# Patient Record
Sex: FEMALE | Race: AMERICAN INDIAN OR ALASKA NATIVE | NOT HISPANIC OR LATINO
[De-identification: names, ages, dates, MRNs, and addresses within clinical notes are randomized per-mention and may not be internally consistent; named-entity substitution may affect disease eponyms.]

---

## 2020-01-28 ENCOUNTER — APPOINTMENT (OUTPATIENT)
Dept: HEMATOLOGY ONCOLOGY | Facility: CLINIC | Age: 35
End: 2020-01-28

## 2020-08-28 ENCOUNTER — APPOINTMENT (OUTPATIENT)
Dept: INFUSION THERAPY | Facility: CLINIC | Age: 35
End: 2020-08-28

## 2020-08-28 ENCOUNTER — APPOINTMENT (OUTPATIENT)
Dept: HEMATOLOGY ONCOLOGY | Facility: CLINIC | Age: 35
End: 2020-08-28

## 2020-09-25 ENCOUNTER — APPOINTMENT (OUTPATIENT)
Dept: HEMATOLOGY ONCOLOGY | Facility: CLINIC | Age: 35
End: 2020-09-25
Payer: COMMERCIAL

## 2020-09-25 PROCEDURE — 99203 OFFICE O/P NEW LOW 30 MIN: CPT | Mod: 95

## 2020-10-01 NOTE — ASSESSMENT
[FreeTextEntry1] : 34 year old female with Von Willebrand disease type 1 , history of severe menorrhagia , anemia requiring transfusions , currently controlled on OCPs. \par She is scheduled for egg harvest and  due to increased bleeding risk  at the puncture site (both  vaginal and ovarian ) , would recommend in addition to local measures , premedication with DDAVP 0.3 microgram/kg ( total 18 microgram in 50 cc normal saline  over 15 to 30 minutes , 30 minutes before procedure . Observe for few hours post procedure for any internal bleeding .

## 2020-10-01 NOTE — HISTORY OF PRESENT ILLNESS
[de-identified] : 34 year old female with Von Willebrand disease type 1 , DDAVP responsive , diagnosed in her teens with menorrhagia , severe anemia required transfusions and parenteral iron . \par She had only modest response to intranasal DDAVP and tranexamic acid however menstrual bleeding is controlled with OCPs and has not required transfusions in many years . Most recent Hb is normal . She continues to experience excessive bleeding with dental work . \par  [de-identified] : 09/25/2020 Today's encounter was carried out via telehealth at her request and in compliance with CDC guidelines given the extreme circumstances surrounding Covid 19 out break . 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 Her menstrual bleeding is well controlled with OCPs but continues to report excessive bleeding with dental work ( improved slightly with tranexamic acid ) . Denies any other bleeding symptoms . \par She is now scheduled for egg harvest in early October .

## 2021-04-20 ENCOUNTER — APPOINTMENT (OUTPATIENT)
Dept: HEMATOLOGY ONCOLOGY | Facility: CLINIC | Age: 36
End: 2021-04-20
Payer: COMMERCIAL

## 2021-04-20 ENCOUNTER — APPOINTMENT (OUTPATIENT)
Dept: INFUSION THERAPY | Facility: CLINIC | Age: 36
End: 2021-04-20

## 2021-04-20 ENCOUNTER — LABORATORY RESULT (OUTPATIENT)
Age: 36
End: 2021-04-20

## 2021-04-20 VITALS
DIASTOLIC BLOOD PRESSURE: 62 MMHG | SYSTOLIC BLOOD PRESSURE: 103 MMHG | TEMPERATURE: 98.6 F | HEART RATE: 66 BPM | HEIGHT: 64 IN | BODY MASS INDEX: 23.05 KG/M2 | RESPIRATION RATE: 16 BRPM | WEIGHT: 135 LBS

## 2021-04-20 LAB
HCT VFR BLD CALC: 35.6 %
HGB BLD-MCNC: 10.5 G/DL
MCHC RBC-ENTMCNC: 25.8 PG
MCHC RBC-ENTMCNC: 29.5 G/DL
MCV RBC AUTO: 87.5 FL
PLATELET # BLD AUTO: 255 K/UL
PMV BLD: 8.8 FL
RBC # BLD: 4.07 M/UL
RBC # FLD: 17.2 %
WBC # FLD AUTO: 5.73 K/UL

## 2021-04-20 PROCEDURE — 99214 OFFICE O/P EST MOD 30 MIN: CPT

## 2021-04-20 RX ORDER — PNV NO.95/FERROUS FUM/FOLIC AC 28MG-0.8MG
TABLET ORAL
Refills: 0 | Status: ACTIVE | COMMUNITY
Start: 2021-04-20

## 2021-04-21 LAB
ALBUMIN SERPL ELPH-MCNC: 4.4 G/DL
ALP BLD-CCNC: 57 U/L
ALT SERPL-CCNC: 12 U/L
ANION GAP SERPL CALC-SCNC: 11 MMOL/L
AST SERPL-CCNC: 15 U/L
BILIRUB SERPL-MCNC: 0.3 MG/DL
BUN SERPL-MCNC: 11 MG/DL
CALCIUM SERPL-MCNC: 9.5 MG/DL
CHLORIDE SERPL-SCNC: 103 MMOL/L
CO2 SERPL-SCNC: 23 MMOL/L
CREAT SERPL-MCNC: 0.7 MG/DL
FERRITIN SERPL-MCNC: 25 NG/ML
GLUCOSE SERPL-MCNC: 83 MG/DL
POTASSIUM SERPL-SCNC: 4.3 MMOL/L
PROT SERPL-MCNC: 7.3 G/DL
SODIUM SERPL-SCNC: 137 MMOL/L
VWF AG PPP IA-ACNC: 147 %

## 2021-04-22 ENCOUNTER — TRANSCRIPTION ENCOUNTER (OUTPATIENT)
Age: 36
End: 2021-04-22

## 2021-04-23 LAB — FACT VIII ACT/NOR PPP: 97 %

## 2021-04-23 NOTE — ASSESSMENT
[FreeTextEntry1] : 35 yof with history of Von Willebrand disease ( type ? ) , DDAVP responsive , and iron deficiency anemia secondary to severe menorrhagia, previously requiring PRBC transfusions, currently controlled with OCPs and tranexamic acid.\par \par - Will check VWF activity, antigen, multimers, and factor VIII levels to obtain new baseline.\par - CBC reviewed. Hemoglobin is 10.5. Ferritin pending. If iron deficiency confirmed, will schedule for iron infusions prior to planned procedure.\par - Continue tranexamic acid during heavy days of menstrual cycle.\par - Patient is s/p egg harvest and is scheduled for outpatient salpingostomy/plasty , possibly bilateral  and surgery may last 4 hours due to adhesions from prior appendectomy , will dicuss with Dr. Abelardo Alicea and likely recommend Humate-p infusions prophylactically . \par - Follow up plan to be determined based on results of above.

## 2021-04-23 NOTE — HISTORY OF PRESENT ILLNESS
[de-identified] : 34 year old female with Von Willebrand disease type 1 , DDAVP responsive , diagnosed in her teens with menorrhagia , severe anemia required transfusions and parenteral iron . \par She had only modest response to intranasal DDAVP and tranexamic acid however menstrual bleeding is controlled with OCPs and has not required transfusions in many years . Most recent Hb is normal . She continues to experience excessive bleeding with dental work . \par  [de-identified] : 09/25/2020 Today's encounter was carried out via telehealth at her request and in compliance with [facility] guidelines given the extreme circumstances surrounding Covid 19 out break . 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 Her menstrual bleeding is well controlled with OCPs but continues to report excessive bleeding with dental work ( improved slightly with tranexamic acid ) . Denies any other bleeding symptoms . \par She is now scheduled for egg harvest in early October . \par \par 4/20/2021: Patient presents for follow up. Underwent egg harvest with preprocedural DDAVP without bleeding complications. She is planned for outpatient salpingostomy with Dr. Abelardo Alicea on 5/12/2021. Per patient, procedure is estimated to last about 3-4 hours. If surgery is successful, plan is for embryo transfer about one month later. Patient remains on OCPs and reports that she has been spotting for the past month. Her menses are extremely heavy without the OCPs. She is taking oral iron and prenatal vitamins daily. Reports experiencing some nausea with oral iron use.

## 2021-04-23 NOTE — REVIEW OF SYSTEMS
[Easy Bleeding] : a tendency for easy bleeding [Negative] : Psychiatric [FreeTextEntry7] : nausea with oral iron use [FreeTextEntry8] : menorrhagia without OCP use

## 2021-04-26 ENCOUNTER — APPOINTMENT (OUTPATIENT)
Dept: INFUSION THERAPY | Facility: CLINIC | Age: 36
End: 2021-04-26

## 2021-04-26 RX ORDER — FOSAPREPITANT DIMEGLUMINE 150 MG/5ML
510 INJECTION, POWDER, LYOPHILIZED, FOR SOLUTION INTRAVENOUS ONCE
Refills: 0 | Status: DISCONTINUED | OUTPATIENT
Start: 2021-04-26 | End: 2021-04-26

## 2021-04-26 RX ORDER — HYDROCORTISONE 20 MG
100 TABLET ORAL ONCE
Refills: 0 | Status: COMPLETED | OUTPATIENT
Start: 2021-04-26 | End: 2021-04-26

## 2021-04-26 RX ORDER — ACETAMINOPHEN 500 MG
650 TABLET ORAL ONCE
Refills: 0 | Status: COMPLETED | OUTPATIENT
Start: 2021-04-26 | End: 2021-04-26

## 2021-04-26 RX ORDER — FERUMOXYTOL 510 MG/17ML
510 INJECTION INTRAVENOUS ONCE
Refills: 0 | Status: COMPLETED | OUTPATIENT
Start: 2021-04-26 | End: 2021-04-26

## 2021-04-26 RX ADMIN — Medication 650 MILLIGRAM(S): at 15:15

## 2021-04-26 RX ADMIN — FERUMOXYTOL 510 MILLIGRAM(S): 510 INJECTION INTRAVENOUS at 16:05

## 2021-04-26 RX ADMIN — FERUMOXYTOL 156 MILLIGRAM(S): 510 INJECTION INTRAVENOUS at 15:35

## 2021-04-26 RX ADMIN — Medication 650 MILLIGRAM(S): at 15:19

## 2021-04-26 RX ADMIN — Medication 100 MILLIGRAM(S): at 15:15

## 2021-04-26 RX ADMIN — Medication 100 MILLIGRAM(S): at 15:35

## 2021-04-30 ENCOUNTER — APPOINTMENT (OUTPATIENT)
Dept: HEMATOLOGY ONCOLOGY | Facility: CLINIC | Age: 36
End: 2021-04-30

## 2021-04-30 ENCOUNTER — OUTPATIENT (OUTPATIENT)
Dept: OUTPATIENT SERVICES | Facility: HOSPITAL | Age: 36
LOS: 1 days | Discharge: HOME | End: 2021-04-30

## 2021-04-30 DIAGNOSIS — D68.0 VON WILLEBRAND DISEASE: ICD-10-CM

## 2021-04-30 DIAGNOSIS — D50.9 IRON DEFICIENCY ANEMIA, UNSPECIFIED: ICD-10-CM

## 2021-05-06 LAB — VWF:RCO ACT/NOR PPP PL AGG: 66 %

## 2021-05-17 LAB — VWF MULTIMERS PPP IA-ACNC: NORMAL

## 2021-06-17 ENCOUNTER — APPOINTMENT (OUTPATIENT)
Dept: OBGYN | Facility: CLINIC | Age: 36
End: 2021-06-17
Payer: COMMERCIAL

## 2021-06-17 DIAGNOSIS — N70.11 CHRONIC SALPINGITIS: ICD-10-CM

## 2021-06-17 PROCEDURE — 99072 ADDL SUPL MATRL&STAF TM PHE: CPT

## 2021-06-17 PROCEDURE — 99203 OFFICE O/P NEW LOW 30 MIN: CPT

## 2021-06-17 NOTE — PLAN
[FreeTextEntry1] : 35 p0\par Primary infertility\par Tubal Factor +/- additional issue\par Under the care of SHYANNE/Nixon. \par Has 2 frozen empryos\par She was recommended to have a salpingectomy vs chromotubation and possible fimbrioplasty to open her tubes before she has an embryo transfer.\par Given her mediocre response to ovulation induction - the doctors at [facility] thought it would be prudent to try and preserve her tubes.\par \par I conselled patient at length that she would be eliza served with a reproductive endocrinologist with expertise in both IVF and fertility surgery. \par \par I referred her to Dr. Ramirez and Dr. Dye.

## 2021-06-17 NOTE — HISTORY OF PRESENT ILLNESS
[FreeTextEntry1] : 35 p0\par C/O infertility x 2 years\par seeing Dr. Alicea\par Had 2 cycles of IVF (Oct 2020, Dec 2020)\par First cycle - 4 mature eggs , 1 genetic normal embryo (frozen) Day 6 (pgt done)\par second cycle - 9 eggs, 1 genetic normal embryo (frozen) Day 7 (pgt done)\par She had sonogram done before planned embryo transfer and there was fluid in the uterus and in the fallopian tube. She was told that she needs to have fallopian tube cleaned or removed by Dr. Alicea.\par Given her VWD - Dr. Ivan recommended that it be done in the hospital\par Patient with VWD as a child - diagnosed due to heavy periods.\par \par \par

## 2021-06-24 ENCOUNTER — OUTPATIENT (OUTPATIENT)
Dept: OUTPATIENT SERVICES | Facility: HOSPITAL | Age: 36
LOS: 1 days | Discharge: HOME | End: 2021-06-24
Payer: COMMERCIAL

## 2021-06-24 VITALS
HEART RATE: 66 BPM | TEMPERATURE: 98 F | OXYGEN SATURATION: 99 % | WEIGHT: 143.08 LBS | DIASTOLIC BLOOD PRESSURE: 77 MMHG | SYSTOLIC BLOOD PRESSURE: 117 MMHG | RESPIRATION RATE: 17 BRPM

## 2021-06-24 DIAGNOSIS — N70.11 CHRONIC SALPINGITIS: ICD-10-CM

## 2021-06-24 DIAGNOSIS — Z90.49 ACQUIRED ABSENCE OF OTHER SPECIFIED PARTS OF DIGESTIVE TRACT: Chronic | ICD-10-CM

## 2021-06-24 DIAGNOSIS — Z01.818 ENCOUNTER FOR OTHER PREPROCEDURAL EXAMINATION: ICD-10-CM

## 2021-06-24 LAB
ALBUMIN SERPL ELPH-MCNC: 4.5 G/DL — SIGNIFICANT CHANGE UP (ref 3.5–5.2)
ALP SERPL-CCNC: 65 U/L — SIGNIFICANT CHANGE UP (ref 30–115)
ALT FLD-CCNC: 11 U/L — SIGNIFICANT CHANGE UP (ref 0–41)
ANION GAP SERPL CALC-SCNC: 11 MMOL/L — SIGNIFICANT CHANGE UP (ref 7–14)
APTT BLD: 37.2 SEC — SIGNIFICANT CHANGE UP (ref 27–39.2)
AST SERPL-CCNC: 13 U/L — SIGNIFICANT CHANGE UP (ref 0–41)
BILIRUB SERPL-MCNC: 0.3 MG/DL — SIGNIFICANT CHANGE UP (ref 0.2–1.2)
BUN SERPL-MCNC: 13 MG/DL — SIGNIFICANT CHANGE UP (ref 10–20)
CALCIUM SERPL-MCNC: 9.8 MG/DL — SIGNIFICANT CHANGE UP (ref 8.5–10.1)
CHLORIDE SERPL-SCNC: 100 MMOL/L — SIGNIFICANT CHANGE UP (ref 98–110)
CO2 SERPL-SCNC: 26 MMOL/L — SIGNIFICANT CHANGE UP (ref 17–32)
CREAT SERPL-MCNC: 0.6 MG/DL — LOW (ref 0.7–1.5)
GLUCOSE SERPL-MCNC: 87 MG/DL — SIGNIFICANT CHANGE UP (ref 70–99)
HCT VFR BLD CALC: 38.6 % — SIGNIFICANT CHANGE UP (ref 37–47)
HGB BLD-MCNC: 12.4 G/DL — SIGNIFICANT CHANGE UP (ref 12–16)
INR BLD: 0.96 RATIO — SIGNIFICANT CHANGE UP (ref 0.65–1.3)
MCHC RBC-ENTMCNC: 27.8 PG — SIGNIFICANT CHANGE UP (ref 27–31)
MCHC RBC-ENTMCNC: 32.1 G/DL — SIGNIFICANT CHANGE UP (ref 32–37)
MCV RBC AUTO: 86.5 FL — SIGNIFICANT CHANGE UP (ref 81–99)
NRBC # BLD: 0 /100 WBCS — SIGNIFICANT CHANGE UP (ref 0–0)
PLATELET # BLD AUTO: 235 K/UL — SIGNIFICANT CHANGE UP (ref 130–400)
POTASSIUM SERPL-MCNC: 4.5 MMOL/L — SIGNIFICANT CHANGE UP (ref 3.5–5)
POTASSIUM SERPL-SCNC: 4.5 MMOL/L — SIGNIFICANT CHANGE UP (ref 3.5–5)
PROT SERPL-MCNC: 7.1 G/DL — SIGNIFICANT CHANGE UP (ref 6–8)
PROTHROM AB SERPL-ACNC: 11 SEC — SIGNIFICANT CHANGE UP (ref 9.95–12.87)
RBC # BLD: 4.46 M/UL — SIGNIFICANT CHANGE UP (ref 4.2–5.4)
RBC # FLD: 16.2 % — HIGH (ref 11.5–14.5)
SODIUM SERPL-SCNC: 137 MMOL/L — SIGNIFICANT CHANGE UP (ref 135–146)
WBC # BLD: 6.43 K/UL — SIGNIFICANT CHANGE UP (ref 4.8–10.8)
WBC # FLD AUTO: 6.43 K/UL — SIGNIFICANT CHANGE UP (ref 4.8–10.8)

## 2021-06-24 PROCEDURE — 93010 ELECTROCARDIOGRAM REPORT: CPT

## 2021-06-24 NOTE — H&P PST ADULT - REASON FOR ADMISSION
36 Y/O FEMALE HERE FOR PRE-ADMISSION SURGICAL TESTING. PATIENT REPORTS + INFERTILITY. + FLUID IN THE UTERUS. STATES ONE OF THE TUBES HAVE TO BE CLEANED OUT AND THE OTHER MAY HAVE TO BE REMOVED.  NOW FOR SCHEDULED PROCEDURE.

## 2021-06-24 NOTE — H&P PST ADULT - HISTORY OF PRESENT ILLNESS
PATIENT DENIES CHEST PAIN, SHORTNESS OF BREATH, PALPITATIONS, COUGHING, FEVER, DYSURIA.  CAN WALK UP 4 FLIGHTS OF STEPS WITHOUT SOB.    NO COUGH, FEVER, SORE THROAT, HEADACHE, LOSS OF TASTE OR SMELL. NO KNOWN EXPOSURE TO ANYONE WITH COVID. PATIENT WAS INSTRUCTED TO ISOLATE FROM NOW UNTIL THE SURGERY.  Anesthesia Alert  NO--Difficult Airway  NO--History of neck surgery or radiation  NO--Limited ROM of neck  NO--History of Malignant hyperthermia  NO--Personal or family history of Pseudocholinesterase deficiency  NO--Prior Anesthesia Complication  NO--Latex Allergy  NO--Loose teeth  NO--History of Rheumatoid Arthritis  NO--MATHIEU  NO--bleeding risk

## 2021-06-24 NOTE — H&P PST ADULT - NSICDXFAMILYHX_GEN_ALL_CORE_FT
FAMILY HISTORY:  Father  Still living? Unknown  FH: CAD (coronary artery disease), Age at diagnosis: Age Unknown  FH: HTN (hypertension), Age at diagnosis: Age Unknown  FH: hypercholesterolemia, Age at diagnosis: Age Unknown    Mother  Still living? Yes, Estimated age: Age Unknown  FH: CAD (coronary artery disease), Age at diagnosis: Age Unknown  FH: HTN (hypertension), Age at diagnosis: Age Unknown  FH: hypercholesterolemia, Age at diagnosis: Age Unknown

## 2021-06-24 NOTE — H&P PST ADULT - NSICDXPASTMEDICALHX_GEN_ALL_CORE_FT
PAST MEDICAL HISTORY:  Asthma no attacks- mild    AMY (iron deficiency anemia)     Type 2A von Willebrand disease

## 2021-06-26 ENCOUNTER — LABORATORY RESULT (OUTPATIENT)
Age: 36
End: 2021-06-26

## 2021-06-26 ENCOUNTER — APPOINTMENT (OUTPATIENT)
Dept: DISASTER EMERGENCY | Facility: CLINIC | Age: 36
End: 2021-06-26

## 2021-06-26 PROBLEM — D68.0 VON WILLEBRAND DISEASE: Chronic | Status: ACTIVE | Noted: 2021-06-24

## 2021-06-26 PROBLEM — J45.909 UNSPECIFIED ASTHMA, UNCOMPLICATED: Chronic | Status: ACTIVE | Noted: 2021-06-24

## 2021-06-26 PROBLEM — D50.9 IRON DEFICIENCY ANEMIA, UNSPECIFIED: Chronic | Status: ACTIVE | Noted: 2021-06-24

## 2021-06-29 ENCOUNTER — INPATIENT (INPATIENT)
Facility: HOSPITAL | Age: 36
LOS: 1 days | Discharge: HOME | End: 2021-07-01
Attending: OBSTETRICS & GYNECOLOGY | Admitting: OBSTETRICS & GYNECOLOGY

## 2021-06-29 VITALS
WEIGHT: 143.08 LBS | OXYGEN SATURATION: 100 % | RESPIRATION RATE: 18 BRPM | TEMPERATURE: 99 F | HEART RATE: 62 BPM | SYSTOLIC BLOOD PRESSURE: 108 MMHG | HEIGHT: 64 IN | DIASTOLIC BLOOD PRESSURE: 56 MMHG

## 2021-06-29 DIAGNOSIS — J45.909 UNSPECIFIED ASTHMA, UNCOMPLICATED: ICD-10-CM

## 2021-06-29 DIAGNOSIS — N70.11 CHRONIC SALPINGITIS: ICD-10-CM

## 2021-06-29 DIAGNOSIS — D68.0 VON WILLEBRAND DISEASE: ICD-10-CM

## 2021-06-29 DIAGNOSIS — N97.9 FEMALE INFERTILITY, UNSPECIFIED: ICD-10-CM

## 2021-06-29 DIAGNOSIS — Z90.49 ACQUIRED ABSENCE OF OTHER SPECIFIED PARTS OF DIGESTIVE TRACT: Chronic | ICD-10-CM

## 2021-06-29 DIAGNOSIS — N73.6 FEMALE PELVIC PERITONEAL ADHESIONS (POSTINFECTIVE): ICD-10-CM

## 2021-06-29 DIAGNOSIS — D50.9 IRON DEFICIENCY ANEMIA, UNSPECIFIED: ICD-10-CM

## 2021-06-29 RX ORDER — SODIUM CHLORIDE 9 MG/ML
1000 INJECTION, SOLUTION INTRAVENOUS
Refills: 0 | Status: DISCONTINUED | OUTPATIENT
Start: 2021-06-29 | End: 2021-06-30

## 2021-06-29 RX ORDER — HYDROMORPHONE HYDROCHLORIDE 2 MG/ML
0.5 INJECTION INTRAMUSCULAR; INTRAVENOUS; SUBCUTANEOUS
Refills: 0 | Status: DISCONTINUED | OUTPATIENT
Start: 2021-06-29 | End: 2021-06-29

## 2021-06-29 RX ORDER — ONDANSETRON 8 MG/1
4 TABLET, FILM COATED ORAL ONCE
Refills: 0 | Status: DISCONTINUED | OUTPATIENT
Start: 2021-06-29 | End: 2021-07-01

## 2021-06-29 RX ORDER — HYDROMORPHONE HYDROCHLORIDE 2 MG/ML
1 INJECTION INTRAMUSCULAR; INTRAVENOUS; SUBCUTANEOUS
Refills: 0 | Status: DISCONTINUED | OUTPATIENT
Start: 2021-06-29 | End: 2021-06-29

## 2021-06-29 RX ORDER — IBUPROFEN 200 MG
600 TABLET ORAL EVERY 6 HOURS
Refills: 0 | Status: DISCONTINUED | OUTPATIENT
Start: 2021-06-29 | End: 2021-06-29

## 2021-06-29 RX ORDER — APREPITANT 80 MG/1
40 CAPSULE ORAL ONCE
Refills: 0 | Status: COMPLETED | OUTPATIENT
Start: 2021-06-29 | End: 2021-06-29

## 2021-06-29 RX ORDER — ACETAMINOPHEN 500 MG
650 TABLET ORAL EVERY 6 HOURS
Refills: 0 | Status: DISCONTINUED | OUTPATIENT
Start: 2021-06-29 | End: 2021-07-01

## 2021-06-29 RX ORDER — OXYCODONE HYDROCHLORIDE 5 MG/1
5 TABLET ORAL EVERY 6 HOURS
Refills: 0 | Status: DISCONTINUED | OUTPATIENT
Start: 2021-06-29 | End: 2021-07-01

## 2021-06-29 RX ORDER — SODIUM CHLORIDE 9 MG/ML
1000 INJECTION, SOLUTION INTRAVENOUS
Refills: 0 | Status: DISCONTINUED | OUTPATIENT
Start: 2021-06-29 | End: 2021-06-29

## 2021-06-29 RX ADMIN — Medication 650 MILLIGRAM(S): at 22:39

## 2021-06-29 RX ADMIN — APREPITANT 40 MILLIGRAM(S): 80 CAPSULE ORAL at 11:00

## 2021-06-29 RX ADMIN — SODIUM CHLORIDE 125 MILLILITER(S): 9 INJECTION, SOLUTION INTRAVENOUS at 14:39

## 2021-06-29 NOTE — PROGRESS NOTE ADULT - ASSESSMENT
36yo female is PMH of von willebrand disease s/p diagnostic laparoscopy humate-P for surgical prophylaxis, doing well    -continue routine post OP care  -vitals per routine  -no lovenox or motrin  -pain management PRN  -humate [id number] x3 doses ordered  -AM labs ordered  -dc until ambulating      Dr. Ramirez and Dr. Jean-Baptiste to be made aware 34yo female is PMH of von willebrand disease s/p diagnostic laparoscopy humate-P for surgical prophylaxis, doing well    -continue routine post OP care  -vitals per routine  -no lovenox or motrin  -pain management PRN  -humate [id number] x3 doses ordered  -AM labs ordered  -dc until ambulating, f/u UO      Dr. Ramirez and Dr. Jean-Baptiste to be made aware

## 2021-06-29 NOTE — BRIEF OPERATIVE NOTE - OPERATION/FINDINGS
normal sized uterus and nulliparus cervix, on laparoscopy bowel densly adhered to anterior abdominal wall, pelvic anatomy unable to be visualized due to adhesions. Intraoperative gyn oncology and general surgery consults made both of which agreed that proceeding with laparoscopy with dangerous and not in the best interest of the patient

## 2021-06-29 NOTE — ASU PATIENT PROFILE, ADULT - TEACHING/LEARNING RELIGIOUS CONSIDERATIONS
Pt stated he is driving to [facility] clinic to sign release of information paperwork.   none

## 2021-06-29 NOTE — CHART NOTE - NSCHARTNOTEFT_GEN_A_CORE
PACU ANESTHESIA ADMISSION NOTE      Procedure: failed open laparoscopy   Post op diagnosis:      ____  Intubated  TV:______       Rate: ______      FiO2: ______    _x___  Patent Airway    _x___  Full return of protective reflexes    _x___  Full recovery from anesthesia / back to baseline status    Vitals:  T 97.9 f  HR: 72  BP: 98/55  RR: 16  SpO2: 100% on NC 3 L/min    Mental Status:  _x___ Awake   _____ Alert   _x____ Drowsy   _____ Sedated    Nausea/Vomiting:  _x___  NO       ______Yes,   See Post - Op Orders         Pain Scale (0-10):  __0___    Treatment: _x___ None    ____ See Post - Op/PCA Orders    Post - Operative Fluids:   ____ Oral   __x__ See Post - Op Orders    Plan: Discharge:   ____Home       ___x__Floor     _____Critical Care    _____  Other:_________________    Comments: uneventful GETA, VSS, full report to pacu rn  No anesthesia issues or complications noted.  Discharge when criteria met.

## 2021-06-29 NOTE — BRIEF OPERATIVE NOTE - NSICDXBRIEFPOSTOP_GEN_ALL_CORE_FT
POST-OP DIAGNOSIS:  Pelvic adhesive disease 29-Jun-2021 14:34:42 abdominal adhesive disease Dye, Sherin

## 2021-06-29 NOTE — ASU PATIENT PROFILE, ADULT - CAREGIVER NAME
Pre op Risk Assessment    Procedure Oral surgery, extractions with IV sedation  Physician Dr. Yokasta Vega  Date of surgery/procedure TBD    Last OV 8/24/2017  Last Stress 4/21/2017  Last Echo 7/14/2016  Last Cath 3/21/2017  Last Stent 3/21/2017  Is patient on blood thinners  Plavix   Hold Meds/how many days Avni Pal

## 2021-06-29 NOTE — PROGRESS NOTE ADULT - SUBJECTIVE AND OBJECTIVE BOX
Chief Complaint:     HPI: Pt doing well, pain well controlled. Denies nausea, vomiting, headache, chest pain, SOB, fevers, chills. Patient has not yet ambulated or passed flatus,but is tolerating a clear liquid diet. Gillespie in place draining clear adequate urine    ROS: Denies cardiovascular or respiratory symptoms    PAST MEDICAL & SURGICAL HISTORY:  Asthma  no attacks- mild    Type 2A von Willebrand disease    AMY (iron deficiency anemia)    History of appendectomy  13 y/o        Physical Exam  Vital Signs Last 24 Hrs  T(F): 97.1 (29 Jun 2021 17:43), Max: 98.6 (29 Jun 2021 10:29)  HR: 85 (29 Jun 2021 17:43) (62 - 85)  BP: 116/71 (29 Jun 2021 17:43) (96/55 - 120/66)  RR: 20 (29 Jun 2021 17:43) (11 - 20)    Physical exam:  General - AAOx3, NAD  Heart - S1S2, RRR  Lungs - CTA BL  Abdomen:  - Soft, nontender, nondistended, BS+  - Clean, dry, intact dressing in place over midline vertical incision  Extremities - No calf tenderness, no swelling    Labs:                        12.4   6.43  )-----------( 235      ( 24 Jun 2021 22:09 )             38.6                Chief Complaint:     HPI: Pt doing well, pain well controlled. Denies nausea, vomiting, headache, chest pain, SOB, fevers, chills. Patient has not yet ambulated or passed flatus,but is tolerating a clear liquid diet. Gillespie in place draining clear adequate urine    ROS: Denies cardiovascular or respiratory symptoms    PAST MEDICAL & SURGICAL HISTORY:  Asthma  no attacks- mild    Type 2A von Willebrand disease    AMY (iron deficiency anemia)    History of appendectomy  15 y/o        Physical Exam  Vital Signs Last 24 Hrs  T(F): 97.1 (29 Jun 2021 17:43), Max: 98.6 (29 Jun 2021 10:29)  HR: 85 (29 Jun 2021 17:43) (62 - 85)  BP: 116/71 (29 Jun 2021 17:43) (96/55 - 120/66)  RR: 20 (29 Jun 2021 17:43) (11 - 20)    Physical exam:  General - AAOx3, NAD  Heart - S1S2, RRR  Lungs - CTA BL  Abdomen:  - Soft, nontender, nondistended, BS+  - Clean, dry, intact dressing in place over midline vertical incision  Extremities - No calf tenderness, no swelling    Labs:                        12.4   6.43  )-----------( 235      ( 24 Jun 2021 22:09 )             38.6         UOmin 32cc/hr: 6810-2138: 700cc

## 2021-06-30 LAB
ANION GAP SERPL CALC-SCNC: 13 MMOL/L — SIGNIFICANT CHANGE UP (ref 7–14)
BASOPHILS # BLD AUTO: 0.01 K/UL — SIGNIFICANT CHANGE UP (ref 0–0.2)
BASOPHILS NFR BLD AUTO: 0.1 % — SIGNIFICANT CHANGE UP (ref 0–1)
BUN SERPL-MCNC: 6 MG/DL — LOW (ref 10–20)
CALCIUM SERPL-MCNC: 9.3 MG/DL — SIGNIFICANT CHANGE UP (ref 8.5–10.1)
CHLORIDE SERPL-SCNC: 103 MMOL/L — SIGNIFICANT CHANGE UP (ref 98–110)
CO2 SERPL-SCNC: 22 MMOL/L — SIGNIFICANT CHANGE UP (ref 17–32)
CREAT SERPL-MCNC: 0.6 MG/DL — LOW (ref 0.7–1.5)
EOSINOPHIL # BLD AUTO: 0.01 K/UL — SIGNIFICANT CHANGE UP (ref 0–0.7)
EOSINOPHIL NFR BLD AUTO: 0.1 % — SIGNIFICANT CHANGE UP (ref 0–8)
GLUCOSE SERPL-MCNC: 158 MG/DL — HIGH (ref 70–99)
HCT VFR BLD CALC: 34.8 % — LOW (ref 37–47)
HGB BLD-MCNC: 11.2 G/DL — LOW (ref 12–16)
IMM GRANULOCYTES NFR BLD AUTO: 0.6 % — HIGH (ref 0.1–0.3)
LYMPHOCYTES # BLD AUTO: 1.61 K/UL — SIGNIFICANT CHANGE UP (ref 1.2–3.4)
LYMPHOCYTES # BLD AUTO: 14.4 % — LOW (ref 20.5–51.1)
MAGNESIUM SERPL-MCNC: 1.7 MG/DL — LOW (ref 1.8–2.4)
MCHC RBC-ENTMCNC: 27.6 PG — SIGNIFICANT CHANGE UP (ref 27–31)
MCHC RBC-ENTMCNC: 32.2 G/DL — SIGNIFICANT CHANGE UP (ref 32–37)
MCV RBC AUTO: 85.7 FL — SIGNIFICANT CHANGE UP (ref 81–99)
MONOCYTES # BLD AUTO: 0.66 K/UL — HIGH (ref 0.1–0.6)
MONOCYTES NFR BLD AUTO: 5.9 % — SIGNIFICANT CHANGE UP (ref 1.7–9.3)
NEUTROPHILS # BLD AUTO: 8.79 K/UL — HIGH (ref 1.4–6.5)
NEUTROPHILS NFR BLD AUTO: 78.9 % — HIGH (ref 42.2–75.2)
NRBC # BLD: 0 /100 WBCS — SIGNIFICANT CHANGE UP (ref 0–0)
PHOSPHATE SERPL-MCNC: 3.5 MG/DL — SIGNIFICANT CHANGE UP (ref 2.1–4.9)
PLATELET # BLD AUTO: 209 K/UL — SIGNIFICANT CHANGE UP (ref 130–400)
POTASSIUM SERPL-MCNC: 4.3 MMOL/L — SIGNIFICANT CHANGE UP (ref 3.5–5)
POTASSIUM SERPL-SCNC: 4.3 MMOL/L — SIGNIFICANT CHANGE UP (ref 3.5–5)
RBC # BLD: 4.06 M/UL — LOW (ref 4.2–5.4)
RBC # FLD: 15.8 % — HIGH (ref 11.5–14.5)
SODIUM SERPL-SCNC: 138 MMOL/L — SIGNIFICANT CHANGE UP (ref 135–146)
WBC # BLD: 11.15 K/UL — HIGH (ref 4.8–10.8)
WBC # FLD AUTO: 11.15 K/UL — HIGH (ref 4.8–10.8)

## 2021-06-30 RX ORDER — LANOLIN ALCOHOL/MO/W.PET/CERES
5 CREAM (GRAM) TOPICAL ONCE
Refills: 0 | Status: COMPLETED | OUTPATIENT
Start: 2021-06-30 | End: 2021-07-01

## 2021-06-30 RX ORDER — MAGNESIUM SULFATE 500 MG/ML
2 VIAL (ML) INJECTION ONCE
Refills: 0 | Status: COMPLETED | OUTPATIENT
Start: 2021-06-30 | End: 2021-06-30

## 2021-06-30 RX ADMIN — Medication 650 MILLIGRAM(S): at 05:12

## 2021-06-30 RX ADMIN — Medication 650 MILLIGRAM(S): at 17:44

## 2021-06-30 RX ADMIN — Medication 50 GRAM(S): at 10:55

## 2021-06-30 RX ADMIN — Medication 650 MILLIGRAM(S): at 11:13

## 2021-06-30 NOTE — PROGRESS NOTE ADULT - SUBJECTIVE AND OBJECTIVE BOX
Chief Complaint:     HPI: Pt doing well, pain well controlled. No overnight events, no acute complaints. Patient is ambulating, tolerating clear liquid diet, passing flatus. Denies headache, chest pain, SOB, nausea, vomiting, fevers, chills, dysuria. Gillespie in place draining adequate clear urine    ROS: Denies cardiovascular or respiratory symptoms    PAST MEDICAL & SURGICAL HISTORY:  Asthma  no attacks- mild    Type 2A von Willebrand disease    AMY (iron deficiency anemia)    History of appendectomy  13 y/o        Physical Exam  Vital Signs Last 24 Hrs  T(F): 97.1 (30 Jun 2021 05:11), Max: 98.6 (29 Jun 2021 10:29)  HR: 78 (30 Jun 2021 05:11) (62 - 91)  BP: 104/58 (30 Jun 2021 05:11) (96/55 - 120/66)  RR: 20 (29 Jun 2021 22:00) (11 - 20)    Physical exam:  General - AAOx3, NAD  Heart - S1S2, RRR  Lungs - CTA BL  Abdomen:  - Soft, nontender, nondistended, BS+  - Clean, dry, intact dressing in place over vertical incision  Extremities - No calf tenderness, no swelling    Labs:                        12.4   6.43  )-----------( 235      ( 24 Jun 2021 22:09 )             38.6

## 2021-06-30 NOTE — PROGRESS NOTE ADULT - ASSESSMENT
34yo female is PMH of von willebrand disease s/p diagnostic laparoscopy on humate-P for surgical prophylaxis, doing well    -continue routine post OP care  -vitals per routine  -no lovenox or motrin  -pain management PRN  -humate [id number] x2 more doses ordered  -f/u AM labs  -dc removed TOV 1230      Dr. Ramirez and Dr. Jean-Baptiste to be made aware

## 2021-07-01 ENCOUNTER — TRANSCRIPTION ENCOUNTER (OUTPATIENT)
Age: 36
End: 2021-07-01

## 2021-07-01 VITALS
SYSTOLIC BLOOD PRESSURE: 90 MMHG | TEMPERATURE: 98 F | HEART RATE: 74 BPM | RESPIRATION RATE: 18 BRPM | DIASTOLIC BLOOD PRESSURE: 54 MMHG

## 2021-07-01 LAB
COVID-19 SPIKE DOMAIN AB INTERP: POSITIVE
COVID-19 SPIKE DOMAIN ANTIBODY RESULT: >250 U/ML — HIGH
SARS-COV-2 IGG+IGM SERPL QL IA: >250 U/ML — HIGH
SARS-COV-2 IGG+IGM SERPL QL IA: POSITIVE

## 2021-07-01 RX ORDER — CHOLECALCIFEROL (VITAMIN D3) 125 MCG
0 CAPSULE ORAL
Qty: 0 | Refills: 0 | DISCHARGE

## 2021-07-01 RX ORDER — DROSPIRENONE AND ETHINYL ESTRADIOL 0.03MG-3MG
1 KIT ORAL
Qty: 0 | Refills: 0 | DISCHARGE

## 2021-07-01 RX ORDER — OXYCODONE HYDROCHLORIDE 5 MG/1
1 TABLET ORAL
Qty: 0 | Refills: 0 | DISCHARGE
Start: 2021-07-01

## 2021-07-01 RX ORDER — LEVOTHYROXINE SODIUM 125 MCG
12.5 TABLET ORAL
Qty: 0 | Refills: 0 | DISCHARGE

## 2021-07-01 RX ORDER — ACETAMINOPHEN 500 MG
2 TABLET ORAL
Qty: 0 | Refills: 0 | DISCHARGE
Start: 2021-07-01

## 2021-07-01 RX ORDER — OXYCODONE HYDROCHLORIDE 5 MG/1
1 TABLET ORAL
Qty: 10 | Refills: 0
Start: 2021-07-01

## 2021-07-01 RX ADMIN — Medication 5 MILLIGRAM(S): at 00:08

## 2021-07-01 RX ADMIN — Medication 650 MILLIGRAM(S): at 00:08

## 2021-07-01 RX ADMIN — Medication 650 MILLIGRAM(S): at 05:19

## 2021-07-01 NOTE — DISCHARGE NOTE NURSING/CASE MANAGEMENT/SOCIAL WORK - PATIENT PORTAL LINK FT
You can access the FollowMyHealth Patient Portal offered by [facility] by registering at the following website: [email or web address]. By joining [employer]’s FollowMyHealth portal, you will also be able to view your health information using other applications (apps) compatible with our system.

## 2021-07-01 NOTE — DISCHARGE NOTE PROVIDER - HOSPITAL COURSE
36yo female with primary infertility presented to the pre-op area for scheduled diagnostic laparoscopy.  Patient admitted for 36 hours of humate P for surgical prophylaxis due to her history of vonWillebrand deficiency type 2.

## 2021-07-01 NOTE — DISCHARGE NOTE PROVIDER - CARE PROVIDER_API CALL
Rasheed Ramirez)  Obstetrics and Gynecology; Reproductive EndoInfertility  [address] [place] [address]  Phone: [phone]  Fax: [phone]  Follow Up Time: 1 week

## 2021-07-01 NOTE — PROGRESS NOTE ADULT - ASSESSMENT
34yo female is PMH of von Willebrand disease s/p diagnostic laparoscopy s/p humate-P for surgical prophylaxis, doing well    -continue routine post OP care  -vitals per routine  -no lovenox or motrin  -pain management PRN  -anticipated d/c home today      Dr. Ramirez and Dr. Jean-Baptiste to be made aware

## 2021-07-01 NOTE — DISCHARGE NOTE PROVIDER - NSDCCPCAREPLAN_GEN_ALL_CORE_FT
PRINCIPAL DISCHARGE DIAGNOSIS  Diagnosis: Infertility, female  Assessment and Plan of Treatment:

## 2021-07-01 NOTE — PROGRESS NOTE ADULT - SUBJECTIVE AND OBJECTIVE BOX
Chief Complaint:     Pt doing well, pain well controlled. No overnight events, no acute complaints. Patient is ambulating, tolerating clear liquid diet, passing flatus, voiding. Denies headache, chest pain, SOB, nausea, vomiting, fevers, chills, dysuria.     ROS: Denies cardiovascular or respiratory symptoms    PAST MEDICAL & SURGICAL HISTORY:  Asthma  no attacks- mild  Type 2A von Willebrand disease  AMY (iron deficiency anemia)  History of appendectomy  13 y/o      Physical Exam  Vital Signs Last 24 Hrs  T(F): 96.2 (01 Jul 2021 00:42), Max: 98.8 (30 Jun 2021 11:30)  HR: 83 (01 Jul 2021 00:42) (79 - 85)  BP: 106/55 (01 Jul 2021 00:42) (101/58 - 135/64)  RR: 18 (01 Jul 2021 00:42) (18 - 19)    Physical exam:  General - AAOx3, NAD  Heart - S1S2, RRR  Lungs - CTA BL  Abdomen:  - Soft, nontender, nondistended, BS+  - Clean, dry, intact dressing in place over vertical incision  Extremities - No calf tenderness, no swelling    Labs:                        11.2   11.15 )-----------( 209      ( 30 Jun 2021 05:52 )             34.8                         12.4   6.43  )-----------( 235      ( 24 Jun 2021 22:09 )             38.6                Chief Complaint: infertility    Pt doing well, pain well controlled. No overnight events, no acute complaints. Patient is ambulating, tolerating clear liquid diet, passing flatus, voiding. Denies headache, chest pain, SOB, nausea, vomiting, fevers, chills, dysuria.     ROS: Denies cardiovascular or respiratory symptoms    PAST MEDICAL & SURGICAL HISTORY:  Asthma  no attacks- mild  Type 2A von Willebrand disease  AMY (iron deficiency anemia)  History of appendectomy  15 y/o      Physical Exam  Vital Signs Last 24 Hrs  T(F): 96.2 (01 Jul 2021 00:42), Max: 98.8 (30 Jun 2021 11:30)  HR: 83 (01 Jul 2021 00:42) (79 - 85)  BP: 106/55 (01 Jul 2021 00:42) (101/58 - 135/64)  RR: 18 (01 Jul 2021 00:42) (18 - 19)    Physical exam:  General - AAOx3, NAD  Heart - S1S2, RRR  Lungs - CTA BL  Abdomen:  - Soft, nontender, nondistended, BS+  - Clean, dry, intact dressing in place over vertical incision  Extremities - No calf tenderness, no swelling    Labs:                        11.2   11.15 )-----------( 209      ( 30 Jun 2021 05:52 )             34.8                         12.4   6.43  )-----------( 235      ( 24 Jun 2021 22:09 )             38.6

## 2021-07-01 NOTE — DISCHARGE NOTE PROVIDER - NSDCMRMEDTOKEN_GEN_ALL_CORE_FT
acetaminophen 325 mg oral tablet: 2 tab(s) orally every 6 hours  oxyCODONE 5 mg oral tablet: 1 tab(s) orally every 6 hours, As needed, Moderate Pain (4 - 6)

## 2021-09-28 ENCOUNTER — OUTPATIENT (OUTPATIENT)
Dept: OUTPATIENT SERVICES | Facility: HOSPITAL | Age: 36
LOS: 1 days | Discharge: HOME | End: 2021-09-28

## 2021-09-28 VITALS
SYSTOLIC BLOOD PRESSURE: 124 MMHG | WEIGHT: 139.99 LBS | DIASTOLIC BLOOD PRESSURE: 76 MMHG | OXYGEN SATURATION: 100 % | HEART RATE: 80 BPM | RESPIRATION RATE: 16 BRPM | TEMPERATURE: 98 F | HEIGHT: 64 IN

## 2021-09-28 DIAGNOSIS — N70.11 CHRONIC SALPINGITIS: ICD-10-CM

## 2021-09-28 DIAGNOSIS — Z98.890 OTHER SPECIFIED POSTPROCEDURAL STATES: Chronic | ICD-10-CM

## 2021-09-28 DIAGNOSIS — Z01.818 ENCOUNTER FOR OTHER PREPROCEDURAL EXAMINATION: ICD-10-CM

## 2021-09-28 DIAGNOSIS — Z90.49 ACQUIRED ABSENCE OF OTHER SPECIFIED PARTS OF DIGESTIVE TRACT: Chronic | ICD-10-CM

## 2021-09-28 LAB
ALBUMIN SERPL ELPH-MCNC: 4.6 G/DL — SIGNIFICANT CHANGE UP (ref 3.5–5.2)
ALP SERPL-CCNC: 83 U/L — SIGNIFICANT CHANGE UP (ref 30–115)
ALT FLD-CCNC: 13 U/L — SIGNIFICANT CHANGE UP (ref 0–41)
ANION GAP SERPL CALC-SCNC: 14 MMOL/L — SIGNIFICANT CHANGE UP (ref 7–14)
APTT BLD: 35.8 SEC — SIGNIFICANT CHANGE UP (ref 27–39.2)
AST SERPL-CCNC: 16 U/L — SIGNIFICANT CHANGE UP (ref 0–41)
BASOPHILS # BLD AUTO: 0.03 K/UL — SIGNIFICANT CHANGE UP (ref 0–0.2)
BASOPHILS NFR BLD AUTO: 0.4 % — SIGNIFICANT CHANGE UP (ref 0–1)
BILIRUB SERPL-MCNC: 0.2 MG/DL — SIGNIFICANT CHANGE UP (ref 0.2–1.2)
BLD GP AB SCN SERPL QL: SIGNIFICANT CHANGE UP
BUN SERPL-MCNC: 10 MG/DL — SIGNIFICANT CHANGE UP (ref 10–20)
CALCIUM SERPL-MCNC: 9.5 MG/DL — SIGNIFICANT CHANGE UP (ref 8.5–10.1)
CHLORIDE SERPL-SCNC: 102 MMOL/L — SIGNIFICANT CHANGE UP (ref 98–110)
CO2 SERPL-SCNC: 22 MMOL/L — SIGNIFICANT CHANGE UP (ref 17–32)
CREAT SERPL-MCNC: 0.5 MG/DL — LOW (ref 0.7–1.5)
EOSINOPHIL # BLD AUTO: 0.18 K/UL — SIGNIFICANT CHANGE UP (ref 0–0.7)
EOSINOPHIL NFR BLD AUTO: 2.6 % — SIGNIFICANT CHANGE UP (ref 0–8)
GLUCOSE SERPL-MCNC: 74 MG/DL — SIGNIFICANT CHANGE UP (ref 70–99)
HCT VFR BLD CALC: 35.5 % — LOW (ref 37–47)
HGB BLD-MCNC: 11.2 G/DL — LOW (ref 12–16)
IMM GRANULOCYTES NFR BLD AUTO: 0.3 % — SIGNIFICANT CHANGE UP (ref 0.1–0.3)
INR BLD: 0.96 RATIO — SIGNIFICANT CHANGE UP (ref 0.65–1.3)
LYMPHOCYTES # BLD AUTO: 2.69 K/UL — SIGNIFICANT CHANGE UP (ref 1.2–3.4)
LYMPHOCYTES # BLD AUTO: 38.9 % — SIGNIFICANT CHANGE UP (ref 20.5–51.1)
MCHC RBC-ENTMCNC: 27.9 PG — SIGNIFICANT CHANGE UP (ref 27–31)
MCHC RBC-ENTMCNC: 31.5 G/DL — LOW (ref 32–37)
MCV RBC AUTO: 88.5 FL — SIGNIFICANT CHANGE UP (ref 81–99)
MONOCYTES # BLD AUTO: 0.39 K/UL — SIGNIFICANT CHANGE UP (ref 0.1–0.6)
MONOCYTES NFR BLD AUTO: 5.6 % — SIGNIFICANT CHANGE UP (ref 1.7–9.3)
NEUTROPHILS # BLD AUTO: 3.61 K/UL — SIGNIFICANT CHANGE UP (ref 1.4–6.5)
NEUTROPHILS NFR BLD AUTO: 52.2 % — SIGNIFICANT CHANGE UP (ref 42.2–75.2)
NRBC # BLD: 0 /100 WBCS — SIGNIFICANT CHANGE UP (ref 0–0)
PLATELET # BLD AUTO: 236 K/UL — SIGNIFICANT CHANGE UP (ref 130–400)
POTASSIUM SERPL-MCNC: 4.5 MMOL/L — SIGNIFICANT CHANGE UP (ref 3.5–5)
POTASSIUM SERPL-SCNC: 4.5 MMOL/L — SIGNIFICANT CHANGE UP (ref 3.5–5)
PROT SERPL-MCNC: 7.4 G/DL — SIGNIFICANT CHANGE UP (ref 6–8)
PROTHROM AB SERPL-ACNC: 11.1 SEC — SIGNIFICANT CHANGE UP (ref 9.95–12.87)
RBC # BLD: 4.01 M/UL — LOW (ref 4.2–5.4)
RBC # FLD: 12.9 % — SIGNIFICANT CHANGE UP (ref 11.5–14.5)
SODIUM SERPL-SCNC: 138 MMOL/L — SIGNIFICANT CHANGE UP (ref 135–146)
WBC # BLD: 6.92 K/UL — SIGNIFICANT CHANGE UP (ref 4.8–10.8)
WBC # FLD AUTO: 6.92 K/UL — SIGNIFICANT CHANGE UP (ref 4.8–10.8)

## 2021-09-28 NOTE — H&P PST ADULT - NSICDXPASTMEDICALHX_GEN_ALL_CORE_FT
PAST MEDICAL HISTORY:  2019 novel coronavirus disease (COVID-19) 3/2021    Asthma no attacks- mild    AMY (iron deficiency anemia)     Type 2A von Willebrand disease

## 2021-09-28 NOTE — H&P PST ADULT - HISTORY OF PRESENT ILLNESS
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 s/p laparoscopy for hydrosalpinx 7/2021 [unsuccessful d/t adhesions].  Failed IVF ? hydrosalpinx presents to pretesting for B/L salpingectomy via laparotomy . Denies COVID S/S. Recd 2 doses of vaccine. Verbalized understanding of COVID prevention measures. Exercise tol2 FOS.  Anesthesia Alert  NO--Difficult Airway  NO--History of neck surgery or radiation  NO--Limited ROM of neck  NO--History of Malignant hyperthermia  NO--Personal or family history of Pseudocholinesterase deficiency  NO--Prior Anesthesia Complication  NO--Latex Allergy  NO--Loose teeth  NO--History of Rheumatoid Arthritis  NO--MATHIEU  No Bleeding risk  YES H/O Von Willebrand disease[h/o multiple PRBC transfusions]

## 2021-09-30 ENCOUNTER — APPOINTMENT (OUTPATIENT)
Dept: INFUSION THERAPY | Facility: CLINIC | Age: 36
End: 2021-09-30

## 2021-09-30 PROBLEM — U07.1 COVID-19: Chronic | Status: ACTIVE | Noted: 2021-09-28

## 2021-09-30 RX ORDER — IRON SUCROSE 20 MG/ML
200 INJECTION, SOLUTION INTRAVENOUS ONCE
Refills: 0 | Status: COMPLETED | OUTPATIENT
Start: 2021-09-30 | End: 2021-09-30

## 2021-09-30 RX ADMIN — IRON SUCROSE 110 MILLIGRAM(S): 20 INJECTION, SOLUTION INTRAVENOUS at 17:41

## 2021-10-01 LAB — FERRITIN SERPL-MCNC: 31 NG/ML

## 2021-10-07 ENCOUNTER — APPOINTMENT (OUTPATIENT)
Dept: INFUSION THERAPY | Facility: CLINIC | Age: 36
End: 2021-10-07

## 2021-10-07 RX ORDER — IRON SUCROSE 20 MG/ML
200 INJECTION, SOLUTION INTRAVENOUS ONCE
Refills: 0 | Status: COMPLETED | OUTPATIENT
Start: 2021-10-07 | End: 2021-10-07

## 2021-10-07 RX ADMIN — IRON SUCROSE 220 MILLIGRAM(S): 20 INJECTION, SOLUTION INTRAVENOUS at 16:17

## 2021-10-08 DIAGNOSIS — Z01.818 ENCOUNTER FOR OTHER PREPROCEDURAL EXAMINATION: ICD-10-CM

## 2021-10-11 ENCOUNTER — APPOINTMENT (OUTPATIENT)
Dept: DISASTER EMERGENCY | Facility: CLINIC | Age: 36
End: 2021-10-11

## 2021-10-14 ENCOUNTER — RESULT REVIEW (OUTPATIENT)
Age: 36
End: 2021-10-14

## 2021-10-14 ENCOUNTER — INPATIENT (INPATIENT)
Facility: HOSPITAL | Age: 36
LOS: 2 days | Discharge: HOME | End: 2021-10-17
Attending: OBSTETRICS & GYNECOLOGY | Admitting: OBSTETRICS & GYNECOLOGY
Payer: COMMERCIAL

## 2021-10-14 VITALS
HEART RATE: 66 BPM | DIASTOLIC BLOOD PRESSURE: 51 MMHG | WEIGHT: 139.99 LBS | OXYGEN SATURATION: 100 % | RESPIRATION RATE: 18 BRPM | TEMPERATURE: 98 F | HEIGHT: 64 IN | SYSTOLIC BLOOD PRESSURE: 101 MMHG

## 2021-10-14 DIAGNOSIS — D68.0 VON WILLEBRAND DISEASE: ICD-10-CM

## 2021-10-14 DIAGNOSIS — Z90.49 ACQUIRED ABSENCE OF OTHER SPECIFIED PARTS OF DIGESTIVE TRACT: Chronic | ICD-10-CM

## 2021-10-14 DIAGNOSIS — N99.4 POSTPROCEDURAL PELVIC PERITONEAL ADHESIONS: ICD-10-CM

## 2021-10-14 DIAGNOSIS — N70.11 CHRONIC SALPINGITIS: ICD-10-CM

## 2021-10-14 DIAGNOSIS — D50.9 IRON DEFICIENCY ANEMIA, UNSPECIFIED: ICD-10-CM

## 2021-10-14 DIAGNOSIS — J45.909 UNSPECIFIED ASTHMA, UNCOMPLICATED: ICD-10-CM

## 2021-10-14 DIAGNOSIS — Z98.890 OTHER SPECIFIED POSTPROCEDURAL STATES: Chronic | ICD-10-CM

## 2021-10-14 LAB
ABO RH CONFIRMATION: SIGNIFICANT CHANGE UP
ANION GAP SERPL CALC-SCNC: 15 MMOL/L — HIGH (ref 7–14)
BASOPHILS # BLD AUTO: 0.01 K/UL — SIGNIFICANT CHANGE UP (ref 0–0.2)
BASOPHILS NFR BLD AUTO: 0.1 % — SIGNIFICANT CHANGE UP (ref 0–1)
BUN SERPL-MCNC: 7 MG/DL — LOW (ref 10–20)
CALCIUM SERPL-MCNC: 8.9 MG/DL — SIGNIFICANT CHANGE UP (ref 8.5–10.1)
CHLORIDE SERPL-SCNC: 102 MMOL/L — SIGNIFICANT CHANGE UP (ref 98–110)
CO2 SERPL-SCNC: 20 MMOL/L — SIGNIFICANT CHANGE UP (ref 17–32)
CREAT SERPL-MCNC: 0.7 MG/DL — SIGNIFICANT CHANGE UP (ref 0.7–1.5)
EOSINOPHIL # BLD AUTO: 0 K/UL — SIGNIFICANT CHANGE UP (ref 0–0.7)
EOSINOPHIL NFR BLD AUTO: 0 % — SIGNIFICANT CHANGE UP (ref 0–8)
GLUCOSE SERPL-MCNC: 150 MG/DL — HIGH (ref 70–99)
HCT VFR BLD CALC: 36.5 % — LOW (ref 37–47)
HGB BLD-MCNC: 11.6 G/DL — LOW (ref 12–16)
IMM GRANULOCYTES NFR BLD AUTO: 0.2 % — SIGNIFICANT CHANGE UP (ref 0.1–0.3)
LYMPHOCYTES # BLD AUTO: 0.55 K/UL — LOW (ref 1.2–3.4)
LYMPHOCYTES # BLD AUTO: 6.5 % — LOW (ref 20.5–51.1)
MAGNESIUM SERPL-MCNC: 1.8 MG/DL — SIGNIFICANT CHANGE UP (ref 1.8–2.4)
MCHC RBC-ENTMCNC: 28.2 PG — SIGNIFICANT CHANGE UP (ref 27–31)
MCHC RBC-ENTMCNC: 31.8 G/DL — LOW (ref 32–37)
MCV RBC AUTO: 88.6 FL — SIGNIFICANT CHANGE UP (ref 81–99)
MONOCYTES # BLD AUTO: 0.52 K/UL — SIGNIFICANT CHANGE UP (ref 0.1–0.6)
MONOCYTES NFR BLD AUTO: 6.2 % — SIGNIFICANT CHANGE UP (ref 1.7–9.3)
NEUTROPHILS # BLD AUTO: 7.35 K/UL — HIGH (ref 1.4–6.5)
NEUTROPHILS NFR BLD AUTO: 87 % — HIGH (ref 42.2–75.2)
NRBC # BLD: 0 /100 WBCS — SIGNIFICANT CHANGE UP (ref 0–0)
PHOSPHATE SERPL-MCNC: 3.5 MG/DL — SIGNIFICANT CHANGE UP (ref 2.1–4.9)
PLATELET # BLD AUTO: 193 K/UL — SIGNIFICANT CHANGE UP (ref 130–400)
POTASSIUM SERPL-MCNC: 4.4 MMOL/L — SIGNIFICANT CHANGE UP (ref 3.5–5)
POTASSIUM SERPL-SCNC: 4.4 MMOL/L — SIGNIFICANT CHANGE UP (ref 3.5–5)
RBC # BLD: 4.12 M/UL — LOW (ref 4.2–5.4)
RBC # FLD: 13.9 % — SIGNIFICANT CHANGE UP (ref 11.5–14.5)
SODIUM SERPL-SCNC: 137 MMOL/L — SIGNIFICANT CHANGE UP (ref 135–146)
WBC # BLD: 8.45 K/UL — SIGNIFICANT CHANGE UP (ref 4.8–10.8)
WBC # FLD AUTO: 8.45 K/UL — SIGNIFICANT CHANGE UP (ref 4.8–10.8)

## 2021-10-14 PROCEDURE — 58740 ADHESIOLYSIS TUBE OVARY: CPT | Mod: LT

## 2021-10-14 PROCEDURE — 88304 TISSUE EXAM BY PATHOLOGIST: CPT | Mod: 26

## 2021-10-14 RX ORDER — CEFAZOLIN SODIUM 1 G
2000 VIAL (EA) INJECTION EVERY 8 HOURS
Refills: 0 | Status: COMPLETED | OUTPATIENT
Start: 2021-10-14 | End: 2021-10-15

## 2021-10-14 RX ORDER — HYDROMORPHONE HYDROCHLORIDE 2 MG/ML
0.25 INJECTION INTRAMUSCULAR; INTRAVENOUS; SUBCUTANEOUS
Refills: 0 | Status: DISCONTINUED | OUTPATIENT
Start: 2021-10-14 | End: 2021-10-14

## 2021-10-14 RX ORDER — HYDROMORPHONE HYDROCHLORIDE 2 MG/ML
1 INJECTION INTRAMUSCULAR; INTRAVENOUS; SUBCUTANEOUS EVERY 4 HOURS
Refills: 0 | Status: DISCONTINUED | OUTPATIENT
Start: 2021-10-14 | End: 2021-10-17

## 2021-10-14 RX ORDER — SIMETHICONE 80 MG/1
80 TABLET, CHEWABLE ORAL EVERY 6 HOURS
Refills: 0 | Status: DISCONTINUED | OUTPATIENT
Start: 2021-10-14 | End: 2021-10-17

## 2021-10-14 RX ORDER — SODIUM CHLORIDE 9 MG/ML
1000 INJECTION, SOLUTION INTRAVENOUS
Refills: 0 | Status: DISCONTINUED | OUTPATIENT
Start: 2021-10-14 | End: 2021-10-15

## 2021-10-14 RX ORDER — KETOROLAC TROMETHAMINE 30 MG/ML
30 SYRINGE (ML) INJECTION EVERY 8 HOURS
Refills: 0 | Status: DISCONTINUED | OUTPATIENT
Start: 2021-10-14 | End: 2021-10-17

## 2021-10-14 RX ORDER — SENNA PLUS 8.6 MG/1
2 TABLET ORAL AT BEDTIME
Refills: 0 | Status: DISCONTINUED | OUTPATIENT
Start: 2021-10-14 | End: 2021-10-17

## 2021-10-14 RX ORDER — HYDROMORPHONE HYDROCHLORIDE 2 MG/ML
0.5 INJECTION INTRAMUSCULAR; INTRAVENOUS; SUBCUTANEOUS
Refills: 0 | Status: DISCONTINUED | OUTPATIENT
Start: 2021-10-14 | End: 2021-10-14

## 2021-10-14 RX ORDER — INFLUENZA VIRUS VACCINE 15; 15; 15; 15 UG/.5ML; UG/.5ML; UG/.5ML; UG/.5ML
0.5 SUSPENSION INTRAMUSCULAR ONCE
Refills: 0 | Status: DISCONTINUED | OUTPATIENT
Start: 2021-10-14 | End: 2021-10-17

## 2021-10-14 RX ORDER — ACETAMINOPHEN 500 MG
1000 TABLET ORAL ONCE
Refills: 0 | Status: COMPLETED | OUTPATIENT
Start: 2021-10-14 | End: 2021-10-14

## 2021-10-14 RX ORDER — SODIUM CHLORIDE 9 MG/ML
1000 INJECTION, SOLUTION INTRAVENOUS
Refills: 0 | Status: DISCONTINUED | OUTPATIENT
Start: 2021-10-14 | End: 2021-10-14

## 2021-10-14 RX ORDER — ACETAMINOPHEN 500 MG
1000 TABLET ORAL EVERY 8 HOURS
Refills: 0 | Status: DISCONTINUED | OUTPATIENT
Start: 2021-10-14 | End: 2021-10-17

## 2021-10-14 RX ORDER — MEPERIDINE HYDROCHLORIDE 50 MG/ML
12.5 INJECTION INTRAMUSCULAR; INTRAVENOUS; SUBCUTANEOUS
Refills: 0 | Status: DISCONTINUED | OUTPATIENT
Start: 2021-10-14 | End: 2021-10-14

## 2021-10-14 RX ORDER — ONDANSETRON 8 MG/1
4 TABLET, FILM COATED ORAL ONCE
Refills: 0 | Status: COMPLETED | OUTPATIENT
Start: 2021-10-14 | End: 2021-10-14

## 2021-10-14 RX ORDER — OXYCODONE HYDROCHLORIDE 5 MG/1
5 TABLET ORAL EVERY 4 HOURS
Refills: 0 | Status: DISCONTINUED | OUTPATIENT
Start: 2021-10-14 | End: 2021-10-17

## 2021-10-14 RX ADMIN — Medication 400 MILLIGRAM(S): at 14:07

## 2021-10-14 RX ADMIN — Medication 30 MILLIGRAM(S): at 13:25

## 2021-10-14 RX ADMIN — ONDANSETRON 4 MILLIGRAM(S): 8 TABLET, FILM COATED ORAL at 13:09

## 2021-10-14 RX ADMIN — Medication 100 MILLIGRAM(S): at 21:17

## 2021-10-14 RX ADMIN — HYDROMORPHONE HYDROCHLORIDE 0.5 MILLIGRAM(S): 2 INJECTION INTRAMUSCULAR; INTRAVENOUS; SUBCUTANEOUS at 13:04

## 2021-10-14 RX ADMIN — SODIUM CHLORIDE 100 MILLILITER(S): 9 INJECTION, SOLUTION INTRAVENOUS at 12:16

## 2021-10-14 RX ADMIN — Medication 1000 MILLIGRAM(S): at 22:02

## 2021-10-14 RX ADMIN — Medication 30 MILLIGRAM(S): at 22:02

## 2021-10-14 RX ADMIN — SODIUM CHLORIDE 125 MILLILITER(S): 9 INJECTION, SOLUTION INTRAVENOUS at 13:41

## 2021-10-14 RX ADMIN — Medication 1000 MILLIGRAM(S): at 21:17

## 2021-10-14 RX ADMIN — Medication 100 MILLIGRAM(S): at 13:52

## 2021-10-14 RX ADMIN — HYDROMORPHONE HYDROCHLORIDE 0.5 MILLIGRAM(S): 2 INJECTION INTRAMUSCULAR; INTRAVENOUS; SUBCUTANEOUS at 13:20

## 2021-10-14 RX ADMIN — Medication 30 MILLIGRAM(S): at 21:17

## 2021-10-14 RX ADMIN — Medication 30 MILLIGRAM(S): at 13:10

## 2021-10-14 RX ADMIN — Medication 1000 MILLIGRAM(S): at 14:45

## 2021-10-14 RX ADMIN — SIMETHICONE 80 MILLIGRAM(S): 80 TABLET, CHEWABLE ORAL at 18:12

## 2021-10-14 NOTE — BRIEF OPERATIVE NOTE - NSICDXBRIEFPROCEDURE_GEN_ALL_CORE_FT
PROCEDURES:  Lysis of adhesions, pelvic 14-Oct-2021 11:42:42  Carlton Chawla  
PROCEDURES:  Lysis of adhesions, pelvic 14-Oct-2021 11:42:42  Carlton Chawla  Exploratory laparotomy 14-Oct-2021 12:16:49  Sheyla Box  Salpingectomy, left 14-Oct-2021 12:17:42  Sheyla Box  Fulguration of right fallopian tube 14-Oct-2021 12:19:10  Sheyla Box

## 2021-10-14 NOTE — BRIEF OPERATIVE NOTE - OPERATION/FINDINGS
Patient taken to the OR by OBGYN team for salpingectomy, previous history of exlap for appendectomy with significant bleeding due to history of VWF. Called in intraoperatively for assistance with lysis of adhesions and closure of abdominal wall
right fallopian tube densely adherent to cecum, minimal visualization secondary to dense and multiple adhesions.   General surgery intraoperative consult called secondary to difficulty with lysis and entry into abdominal cavity

## 2021-10-14 NOTE — BRIEF OPERATIVE NOTE - NSICDXBRIEFPOSTOP_GEN_ALL_CORE_FT
POST-OP DIAGNOSIS:  Pelvic adhesions 14-Oct-2021 11:43:54  Carlton Chawla  
POST-OP DIAGNOSIS:  Hydrosalpinx 14-Oct-2021 12:20:20  Sheyla Box  History of infertility due to disorder of fallopian tube 14-Oct-2021 12:20:25  Sheyla Box  Pelvic adhesions 14-Oct-2021 11:43:54  Carlton Chawla

## 2021-10-14 NOTE — BRIEF OPERATIVE NOTE - NSICDXBRIEFPREOP_GEN_ALL_CORE_FT
PRE-OP DIAGNOSIS:  Pelvic adhesions 14-Oct-2021 11:43:49  Carlton Chawla  
PRE-OP DIAGNOSIS:  Pelvic adhesions 14-Oct-2021 11:43:49  Carlton Chawla  Hydrosalpinx 14-Oct-2021 12:19:51  Sheyla Box  History of infertility due to disorder of fallopian tube 14-Oct-2021 12:20:16  Sheyla Box

## 2021-10-15 LAB
ANION GAP SERPL CALC-SCNC: 12 MMOL/L — SIGNIFICANT CHANGE UP (ref 7–14)
BASOPHILS # BLD AUTO: 0.01 K/UL — SIGNIFICANT CHANGE UP (ref 0–0.2)
BASOPHILS NFR BLD AUTO: 0.1 % — SIGNIFICANT CHANGE UP (ref 0–1)
BUN SERPL-MCNC: 8 MG/DL — LOW (ref 10–20)
CALCIUM SERPL-MCNC: 8.6 MG/DL — SIGNIFICANT CHANGE UP (ref 8.5–10.1)
CHLORIDE SERPL-SCNC: 104 MMOL/L — SIGNIFICANT CHANGE UP (ref 98–110)
CO2 SERPL-SCNC: 22 MMOL/L — SIGNIFICANT CHANGE UP (ref 17–32)
COVID-19 SPIKE DOMAIN AB INTERP: POSITIVE
COVID-19 SPIKE DOMAIN ANTIBODY RESULT: >250 U/ML — HIGH
CREAT SERPL-MCNC: 0.7 MG/DL — SIGNIFICANT CHANGE UP (ref 0.7–1.5)
EOSINOPHIL # BLD AUTO: 0 K/UL — SIGNIFICANT CHANGE UP (ref 0–0.7)
EOSINOPHIL NFR BLD AUTO: 0 % — SIGNIFICANT CHANGE UP (ref 0–8)
GLUCOSE SERPL-MCNC: 139 MG/DL — HIGH (ref 70–99)
HCT VFR BLD CALC: 30.8 % — LOW (ref 37–47)
HGB BLD-MCNC: 9.9 G/DL — LOW (ref 12–16)
IMM GRANULOCYTES NFR BLD AUTO: 0.3 % — SIGNIFICANT CHANGE UP (ref 0.1–0.3)
LYMPHOCYTES # BLD AUTO: 1.25 K/UL — SIGNIFICANT CHANGE UP (ref 1.2–3.4)
LYMPHOCYTES # BLD AUTO: 17.6 % — LOW (ref 20.5–51.1)
MCHC RBC-ENTMCNC: 28.3 PG — SIGNIFICANT CHANGE UP (ref 27–31)
MCHC RBC-ENTMCNC: 32.1 G/DL — SIGNIFICANT CHANGE UP (ref 32–37)
MCV RBC AUTO: 88 FL — SIGNIFICANT CHANGE UP (ref 81–99)
MONOCYTES # BLD AUTO: 0.44 K/UL — SIGNIFICANT CHANGE UP (ref 0.1–0.6)
MONOCYTES NFR BLD AUTO: 6.2 % — SIGNIFICANT CHANGE UP (ref 1.7–9.3)
NEUTROPHILS # BLD AUTO: 5.37 K/UL — SIGNIFICANT CHANGE UP (ref 1.4–6.5)
NEUTROPHILS NFR BLD AUTO: 75.8 % — HIGH (ref 42.2–75.2)
NRBC # BLD: 0 /100 WBCS — SIGNIFICANT CHANGE UP (ref 0–0)
PHOSPHATE SERPL-MCNC: 2.7 MG/DL — SIGNIFICANT CHANGE UP (ref 2.1–4.9)
PLATELET # BLD AUTO: 184 K/UL — SIGNIFICANT CHANGE UP (ref 130–400)
POTASSIUM SERPL-MCNC: 3.6 MMOL/L — SIGNIFICANT CHANGE UP (ref 3.5–5)
POTASSIUM SERPL-SCNC: 3.6 MMOL/L — SIGNIFICANT CHANGE UP (ref 3.5–5)
RBC # BLD: 3.5 M/UL — LOW (ref 4.2–5.4)
RBC # FLD: 14.4 % — SIGNIFICANT CHANGE UP (ref 11.5–14.5)
SARS-COV-2 IGG+IGM SERPL QL IA: >250 U/ML — HIGH
SARS-COV-2 IGG+IGM SERPL QL IA: POSITIVE
SODIUM SERPL-SCNC: 138 MMOL/L — SIGNIFICANT CHANGE UP (ref 135–146)
WBC # BLD: 7.09 K/UL — SIGNIFICANT CHANGE UP (ref 4.8–10.8)
WBC # FLD AUTO: 7.09 K/UL — SIGNIFICANT CHANGE UP (ref 4.8–10.8)

## 2021-10-15 PROCEDURE — 99024 POSTOP FOLLOW-UP VISIT: CPT

## 2021-10-15 PROCEDURE — 71045 X-RAY EXAM CHEST 1 VIEW: CPT | Mod: 26

## 2021-10-15 RX ORDER — LANOLIN ALCOHOL/MO/W.PET/CERES
5 CREAM (GRAM) TOPICAL ONCE
Refills: 0 | Status: COMPLETED | OUTPATIENT
Start: 2021-10-15 | End: 2021-10-15

## 2021-10-15 RX ORDER — SODIUM CHLORIDE 9 MG/ML
1000 INJECTION, SOLUTION INTRAVENOUS
Refills: 0 | Status: DISCONTINUED | OUTPATIENT
Start: 2021-10-15 | End: 2021-10-16

## 2021-10-15 RX ORDER — PANTOPRAZOLE SODIUM 20 MG/1
40 TABLET, DELAYED RELEASE ORAL
Refills: 0 | Status: DISCONTINUED | OUTPATIENT
Start: 2021-10-15 | End: 2021-10-17

## 2021-10-15 RX ORDER — ENOXAPARIN SODIUM 100 MG/ML
40 INJECTION SUBCUTANEOUS DAILY
Refills: 0 | Status: DISCONTINUED | OUTPATIENT
Start: 2021-10-15 | End: 2021-10-15

## 2021-10-15 RX ORDER — PANTOPRAZOLE SODIUM 20 MG/1
40 TABLET, DELAYED RELEASE ORAL
Refills: 0 | Status: DISCONTINUED | OUTPATIENT
Start: 2021-10-15 | End: 2021-10-15

## 2021-10-15 RX ORDER — LANOLIN ALCOHOL/MO/W.PET/CERES
5 CREAM (GRAM) TOPICAL AT BEDTIME
Refills: 0 | Status: DISCONTINUED | OUTPATIENT
Start: 2021-10-15 | End: 2021-10-17

## 2021-10-15 RX ADMIN — Medication 5 MILLIGRAM(S): at 02:14

## 2021-10-15 RX ADMIN — OXYCODONE HYDROCHLORIDE 5 MILLIGRAM(S): 5 TABLET ORAL at 09:45

## 2021-10-15 RX ADMIN — Medication 30 MILLIGRAM(S): at 21:26

## 2021-10-15 RX ADMIN — SIMETHICONE 80 MILLIGRAM(S): 80 TABLET, CHEWABLE ORAL at 23:34

## 2021-10-15 RX ADMIN — Medication 5 MILLIGRAM(S): at 21:25

## 2021-10-15 RX ADMIN — Medication 30 MILLIGRAM(S): at 22:09

## 2021-10-15 RX ADMIN — SODIUM CHLORIDE 100 MILLILITER(S): 9 INJECTION, SOLUTION INTRAVENOUS at 18:36

## 2021-10-15 RX ADMIN — PANTOPRAZOLE SODIUM 40 MILLIGRAM(S): 20 TABLET, DELAYED RELEASE ORAL at 18:41

## 2021-10-15 RX ADMIN — SIMETHICONE 80 MILLIGRAM(S): 80 TABLET, CHEWABLE ORAL at 09:08

## 2021-10-15 RX ADMIN — SIMETHICONE 80 MILLIGRAM(S): 80 TABLET, CHEWABLE ORAL at 18:12

## 2021-10-15 RX ADMIN — Medication 1000 MILLIGRAM(S): at 12:46

## 2021-10-15 RX ADMIN — Medication 1000 MILLIGRAM(S): at 22:09

## 2021-10-15 RX ADMIN — SIMETHICONE 80 MILLIGRAM(S): 80 TABLET, CHEWABLE ORAL at 00:48

## 2021-10-15 RX ADMIN — OXYCODONE HYDROCHLORIDE 5 MILLIGRAM(S): 5 TABLET ORAL at 09:08

## 2021-10-15 RX ADMIN — Medication 100 MILLIGRAM(S): at 05:50

## 2021-10-15 RX ADMIN — Medication 30 MILLIGRAM(S): at 14:07

## 2021-10-15 RX ADMIN — Medication 1000 MILLIGRAM(S): at 21:26

## 2021-10-15 RX ADMIN — SODIUM CHLORIDE 100 MILLILITER(S): 9 INJECTION, SOLUTION INTRAVENOUS at 10:02

## 2021-10-15 RX ADMIN — Medication 30 MILLIGRAM(S): at 06:07

## 2021-10-15 RX ADMIN — Medication 30 MILLIGRAM(S): at 05:51

## 2021-10-15 NOTE — PROVIDER CONTACT NOTE (OTHER) - SITUATION
patient unable to tolerate flush of meds via NG tube. refusing meds at this time
discontinued dc catheter as ordered, NGT was removed by MD, got patient out of bed and to the chair, patient was in the chair for about 20 minutes and said she "felt like she was going to faint"
patient has NGT post op, refusing to have it

## 2021-10-15 NOTE — PROVIDER CONTACT NOTE (OTHER) - ASSESSMENT
patient pale and diaphoretic, put back to bed with the help of PCA, ice pack applied to the back of the patient's neck, vitals obtained and normal, IVF infusing, patient NPO as ordered; patient stated she felt better once back in bed
patient has NGT to LCS with a large amount of output, chest xray was done and the surgical team wants the NGT to stay in place but the patient is refusing and wants it to be taken out

## 2021-10-15 NOTE — PROVIDER CONTACT NOTE (OTHER) - RECOMMENDATIONS
as per surgical team, the NGT should stay in place, patient educated but still refusing
for MD to assess patient

## 2021-10-15 NOTE — PROVIDER CONTACT NOTE (OTHER) - ACTION/TREATMENT ORDERED:
as per ROSALBA Newsome, will speak with attending
DO Ali made aware
as per MD, could not come at this time, but for RN to reassess vitals in one hour

## 2021-10-16 PROCEDURE — 99232 SBSQ HOSP IP/OBS MODERATE 35: CPT

## 2021-10-16 RX ADMIN — PANTOPRAZOLE SODIUM 40 MILLIGRAM(S): 20 TABLET, DELAYED RELEASE ORAL at 17:16

## 2021-10-16 RX ADMIN — SIMETHICONE 80 MILLIGRAM(S): 80 TABLET, CHEWABLE ORAL at 17:16

## 2021-10-16 RX ADMIN — Medication 1000 MILLIGRAM(S): at 13:10

## 2021-10-16 RX ADMIN — SIMETHICONE 80 MILLIGRAM(S): 80 TABLET, CHEWABLE ORAL at 11:31

## 2021-10-16 RX ADMIN — Medication 1000 MILLIGRAM(S): at 05:24

## 2021-10-16 RX ADMIN — Medication 30 MILLIGRAM(S): at 05:24

## 2021-10-16 RX ADMIN — SIMETHICONE 80 MILLIGRAM(S): 80 TABLET, CHEWABLE ORAL at 05:24

## 2021-10-16 RX ADMIN — OXYCODONE HYDROCHLORIDE 5 MILLIGRAM(S): 5 TABLET ORAL at 20:59

## 2021-10-16 RX ADMIN — PANTOPRAZOLE SODIUM 40 MILLIGRAM(S): 20 TABLET, DELAYED RELEASE ORAL at 05:24

## 2021-10-16 RX ADMIN — Medication 5 MILLIGRAM(S): at 21:23

## 2021-10-16 RX ADMIN — Medication 1000 MILLIGRAM(S): at 14:10

## 2021-10-16 RX ADMIN — Medication 1000 MILLIGRAM(S): at 20:58

## 2021-10-16 NOTE — PROGRESS NOTE ADULT - ATTENDING COMMENTS
Patient seen and examined with surgery team on rounds and discussed management plans. feeling well started on clears today abd wound clean dressings in place will advance diet after discussion with gyn voiding well now. Lab reviewed

## 2021-10-17 ENCOUNTER — TRANSCRIPTION ENCOUNTER (OUTPATIENT)
Age: 36
End: 2021-10-17

## 2021-10-17 VITALS — SYSTOLIC BLOOD PRESSURE: 125 MMHG | HEART RATE: 77 BPM | DIASTOLIC BLOOD PRESSURE: 68 MMHG

## 2021-10-17 PROCEDURE — 99231 SBSQ HOSP IP/OBS SF/LOW 25: CPT

## 2021-10-17 RX ORDER — PANTOPRAZOLE SODIUM 20 MG/1
1 TABLET, DELAYED RELEASE ORAL
Qty: 0 | Refills: 0 | DISCHARGE
Start: 2021-10-17

## 2021-10-17 RX ORDER — SENNA PLUS 8.6 MG/1
2 TABLET ORAL
Qty: 60 | Refills: 0
Start: 2021-10-17 | End: 2021-11-15

## 2021-10-17 RX ORDER — OXYCODONE HYDROCHLORIDE 5 MG/1
1 TABLET ORAL
Qty: 18 | Refills: 0
Start: 2021-10-17 | End: 2021-10-19

## 2021-10-17 RX ORDER — ACETAMINOPHEN 500 MG
2 TABLET ORAL
Qty: 0 | Refills: 0 | DISCHARGE
Start: 2021-10-17

## 2021-10-17 RX ADMIN — SIMETHICONE 80 MILLIGRAM(S): 80 TABLET, CHEWABLE ORAL at 00:00

## 2021-10-17 RX ADMIN — SIMETHICONE 80 MILLIGRAM(S): 80 TABLET, CHEWABLE ORAL at 05:11

## 2021-10-17 RX ADMIN — Medication 1000 MILLIGRAM(S): at 05:11

## 2021-10-17 RX ADMIN — PANTOPRAZOLE SODIUM 40 MILLIGRAM(S): 20 TABLET, DELAYED RELEASE ORAL at 05:12

## 2021-10-17 NOTE — PROGRESS NOTE ADULT - REASON FOR ADMISSION
s/p ex lap, KENDALL, left salpingectomy and fulguration of right fallopian tube, intraoperative general surgery consult for KENDALL
hydrosalpinx
s/p ex lap, KENDALL, left salpingectomy and fulguration of right fallopian tube, intraoperative general surgery consult for KENDALL

## 2021-10-17 NOTE — DISCHARGE NOTE NURSING/CASE MANAGEMENT/SOCIAL WORK - PATIENT PORTAL LINK FT
You can access the FollowMyHealth Patient Portal offered by [facility] by registering at the following website: [email or web address]. By joining [employer]’s FollowMyHealth portal, you will also be able to view your health information using other applications (apps) compatible with our system.

## 2021-10-17 NOTE — DISCHARGE NOTE PROVIDER - HOSPITAL COURSE
On 10/14; 36yo PMH of VWD2A, hx of ruptured appendicitis with xlap in [place], s/p ex lap, extensive KENDALL, left salpingectomy right fallopian tube fulguration EBL 200cc/  s/p tap block   14>11<236, Coags WNL, Cr 0.5, AB POS  10/14 @1600 8.45>11.6/36.5<193, 137/4.4/102/20/7/0.7<150, ma.8, phos: 3.5  10/15 7.09>9.9/30.8<184, 138/3.6/104/22/8/0.7<139, phos 2.7    While inpatient:  ancef 24hr/regular/ voided/ SCDs  -dilaudid+toradol ordered, ERAS ordered  [x] 48hours of Humate-P 1200 IU q12hr for 48 hr per hemonc (ordered) - 4cc/min  - f/u doses given (2330, 1130) - last given 10/16 @1130    10/15  surgery recs: keep NG tube as output high, switch to maintenance fluids, keep NPO  -NPO, self d/cd her NGT @1000     10/16 advanced to regular    10/17  [x] d/c home, cleared with surgery

## 2021-10-17 NOTE — DISCHARGE NOTE PROVIDER - NSDCCPCAREPLAN_GEN_ALL_CORE_FT
PRINCIPAL DISCHARGE DIAGNOSIS  Diagnosis: Female infertility  Assessment and Plan of Treatment:

## 2021-10-17 NOTE — PROGRESS NOTE ADULT - SUBJECTIVE AND OBJECTIVE BOX
GENERAL SURGERY PROGRESS NOTE    Patient: ADAM SINGH , 35y ([birth year])Female   MRN: [id number]  Location: [address]  Visit: 10-14-21 Inpatient  Date: 10-15-21 @ 18:10    Hospital Day #: 2  Post-Op Day #: 1    Procedure/Dx/Injuries: s/p ex lap, KENDALL, left salpingectomy and fulguration of right fallopian tube, intraoperative general surgery consult for KENDALL    Events of past 24 hours: NGT was removed after coffee ground output noted, patient passing gas, no BM. Ambulating and voiding without difficulty. Denies any pain    PAST MEDICAL & SURGICAL HISTORY:  Asthma  no attacks- mild    Type 2A von Willebrand disease    AMY (iron deficiency anemia)    2019 novel coronavirus disease (COVID-19)  3/2021    History of appendectomy  15 y/o    S/P laparoscopy  7/2021        Vitals:   T(F): 99.2 (10-15-21 @ 17:32), Max: 99.4 (10-14-21 @ 21:44)  HR: 88 (10-15-21 @ 17:32)  BP: 102/58 (10-15-21 @ 17:32)  RR: 18 (10-15-21 @ 17:32)  SpO2: 99% (10-15-21 @ 17:32)      Diet, NPO  Diet, NPO:   Except Medications      Fluids: dextrose 5% + sodium chloride 0.45%.: Solution, 1000 milliLiter(s) infuse at 100 mL/Hr      I & O's:    10-14-21 @ 07:01  -  10-15-21 @ 07:00  --------------------------------------------------------  IN:    Lactated Ringers: 100 mL    Lactated Ringers: 500 mL  Total IN: 600 mL    OUT:    Indwelling Catheter - Urethral (mL): 1475 mL    Nasogastric/Oral tube (mL): 1455 mL  Total OUT: 2930 mL    Total NET: -2330 mL        Bowel Movement: : [] YES [X] NO  Flatus: : [X] YES [] NO    PHYSICAL EXAM:  General: NAD, AAOx3, calm and cooperative  HEENT: NCAT, MONICA, EOMI, Trachea ML, Neck supple  Cardiac: RRR S1, S2, no Murmurs, rubs or gallops  Respiratory: CTAB, normal respiratory effort, breath sounds equal BL, no wheeze, rhonchi or crackles  Abdomen: Soft, non-distended, tender to palp around ostomy wound, wound with green discharge.    MEDICATIONS  (STANDING):  acetaminophen   Tablet .. 1000 milliGRAM(s) Oral every 8 hours  dextrose 5% + sodium chloride 0.45%. 1000 milliLiter(s) (100 mL/Hr) IV Continuous <Continuous>  enoxaparin Injectable 40 milliGRAM(s) SubCutaneous daily  influenza   Vaccine 0.5 milliLiter(s) IntraMuscular once  ketorolac   Injectable 30 milliGRAM(s) IV Push every 8 hours  melatonin 5 milliGRAM(s) Oral at bedtime  pantoprazole   Suspension 40 milliGRAM(s) Oral two times a day  simethicone 80 milliGRAM(s) Chew every 6 hours    MEDICATIONS  (PRN):  HYDROmorphone   Tablet 1 milliGRAM(s) Oral every 4 hours PRN Moderate Pain (4 - 6)  oxyCODONE    IR 5 milliGRAM(s) Oral every 4 hours PRN Moderate Pain (4 - 6)  senna 2 Tablet(s) Oral at bedtime PRN Constipation      DVT PROPHYLAXIS: enoxaparin Injectable 40 milliGRAM(s) SubCutaneous daily    GI PROPHYLAXIS: pantoprazole   Suspension 40 milliGRAM(s) Oral two times a day    ANTICOAGULATION:   ANTIBIOTICS:            LAB/STUDIES:  Labs:  CAPILLARY BLOOD GLUCOSE                              9.9    7.09  )-----------( 184      ( 15 Oct 2021 06:38 )             30.8       Auto Neutrophil %: 75.8 % (10-15-21 @ 06:38)  Auto Immature Granulocyte %: 0.3 % (10-15-21 @ 06:38)    10-15    138  |  104  |  8<L>  ----------------------------<  139<H>  3.6   |  22  |  0.7      Calcium, Total Serum: 8.6 mg/dL (10-15-21 @ 06:38)      LFTs:         Coags:                        IMAGING:      ACCESS/ DEVICES:  [X] Peripheral IV  [ ] Central Venous Line	[ ] R	[ ] L	[ ] IJ	[ ] Fem	[ ] SC	Placed:   [ ] Arterial Line		[ ] R	[ ] L	[ ] Fem	[ ] Rad	[ ] Ax	Placed:   [ ] PICC:					[ ] Mediport  [ ] Urinary Catheter,  Date Placed:   [ ] Chest tube: [ ] Right, [ ] Left  [ ] KYARA/Eliud Drains        
GENERAL SURGERY PROGRESS NOTE    Patient: ADAM SINGH , 35y ([birth year])Female   MRN: [id number]  Location: [address]  Visit: 10-14-21 Inpatient  Date: 10-17-21 @ 08:56    Hospital Day #: 4  Post-Op Day #: 3    Procedure/Dx/Injuries: s/p ex lap, KENDALL, left salpingectomy and fulguration of right fallopian tube, intraoperative general surgery consult for KENDALL    Events of past 24 hours: having gas and BM, ambulating, tolerating regular diet,    PAST MEDICAL & SURGICAL HISTORY:  Asthma  no attacks- mild    Type 2A von Willebrand disease    AMY (iron deficiency anemia)    2019 novel coronavirus disease (COVID-19)  3/2021    History of appendectomy  15 y/o    S/P laparoscopy  7/2021        Vitals:   T(F): 98.2 (10-17-21 @ 08:52), Max: 98.2 (10-17-21 @ 08:52)  HR: 96 (10-17-21 @ 08:52)  BP: 136/61 (10-17-21 @ 08:52)  RR: 20 (10-17-21 @ 08:52)  SpO2: 99% (10-17-21 @ 08:52)      Diet, Regular      Fluids:     I & O's:    10-16-21 @ 07:01  -  10-17-21 @ 07:00  --------------------------------------------------------  IN:  Total IN: 0 mL    OUT:    Voided (mL): 1100 mL  Total OUT: 1100 mL    Total NET: -1100 mL      PHYSICAL EXAM:  General: NAD, AAOx3, calm and cooperative  HEENT: NCAT, MONICA, EOMI, Trachea ML, Neck supple  Cardiac: RRR S1, S2, no Murmurs, rubs or gallops  Respiratory: CTAB, normal respiratory effort, breath sounds equal BL, no wheeze, rhonchi or crackles  Abdomen: Soft, non-distended, non-tender    MEDICATIONS  (STANDING):  acetaminophen   Tablet .. 1000 milliGRAM(s) Oral every 8 hours  influenza   Vaccine 0.5 milliLiter(s) IntraMuscular once  ketorolac   Injectable 30 milliGRAM(s) IV Push every 8 hours  melatonin 5 milliGRAM(s) Oral at bedtime  pantoprazole    Tablet 40 milliGRAM(s) Oral two times a day  simethicone 80 milliGRAM(s) Chew every 6 hours    MEDICATIONS  (PRN):  HYDROmorphone   Tablet 1 milliGRAM(s) Oral every 4 hours PRN Moderate Pain (4 - 6)  oxyCODONE    IR 5 milliGRAM(s) Oral every 4 hours PRN Moderate Pain (4 - 6)  senna 2 Tablet(s) Oral at bedtime PRN Constipation      DVT PROPHYLAXIS:   GI PROPHYLAXIS: pantoprazole    Tablet 40 milliGRAM(s) Oral two times a day    ANTICOAGULATION:   ANTIBIOTICS:            LAB/STUDIES:  Labs:  CAPILLARY BLOOD GLUCOSE                      LFTs:         Coags:                        IMAGING:      ACCESS/ DEVICES:  [x] Peripheral IV  [ ] Central Venous Line	[ ] R	[ ] L	[ ] IJ	[ ] Fem	[ ] SC	Placed:   [ ] Arterial Line		[ ] R	[ ] L	[ ] Fem	[ ] Rad	[ ] Ax	Placed:   [ ] PICC:					[ ] Mediport  [ ] Urinary Catheter,  Date Placed:   [ ] Chest tube: [ ] Right, [ ] Left  [ ] KYARA/Eliud Drains    
PGY 3 Note  Patient seen and evaluated at bedside, doing well, no complaints. No acute events overnight. Patient is NPO, voiding and ambulating without difficulty. Passing flatus, denies BM. Denies fever, chills, CP, SOB, severe abdominal pain, VB, urinary symptoms or LE pain/swelling. Pain well controlled on PO meds.    Physical exam:    Vital Signs Last 24 Hrs  T(F): 96.4 (16 Oct 2021 05:14), Max: 99.3 (15 Oct 2021 22:22)  HR: 81 (16 Oct 2021 05:14) (81 - 98)  BP: 102/58 (16 Oct 2021 05:14) (97/53 - 113/58)  RR: 18 (16 Oct 2021 05:14) (18 - 18)  SpO2: 97% (16 Oct 2021 05:14) (96% - 100%)    Gen: NAD, AOx3  CVS: RRR  Lungs: CTABL  Abdomen: Soft, nontender, non distended. No rebound, rigidity or guarding. Midline incision clean/dry/intact, no erythema/induration/drainage.  Perineum: no active bleeding  Ext: No calf tenderness    Diet: NPO    MEDICATIONS  (STANDING):  acetaminophen   Tablet .. 1000 milliGRAM(s) Oral every 8 hours  dextrose 5% + sodium chloride 0.45%. 1000 milliLiter(s) (100 mL/Hr) IV Continuous <Continuous>  influenza   Vaccine 0.5 milliLiter(s) IntraMuscular once  ketorolac   Injectable 30 milliGRAM(s) IV Push every 8 hours  melatonin 5 milliGRAM(s) Oral at bedtime  pantoprazole    Tablet 40 milliGRAM(s) Oral two times a day  simethicone 80 milliGRAM(s) Chew every 6 hours    MEDICATIONS  (PRN):  HYDROmorphone   Tablet 1 milliGRAM(s) Oral every 4 hours PRN Moderate Pain (4 - 6)  oxyCODONE    IR 5 milliGRAM(s) Oral every 4 hours PRN Moderate Pain (4 - 6)  senna 2 Tablet(s) Oral at bedtime PRN Constipation      LABS:                        9.9    7.09  )-----------( 184      ( 15 Oct 2021 06:38 )             30.8                         11.6   8.45  )-----------( 193      ( 14 Oct 2021 17:00 )             36.5       10-15-21 @ 06:38      138  |  104  |  8<L>  ----------------------------<  139<H>  3.6   |  22  |  0.7    10-14-21 @ 17:00      137  |  102  |  7<L>  ----------------------------<  150<H>  4.4   |  20  |  0.7        Ca    8.6      15 Oct 2021 06:38  Ca    8.9      14 Oct 2021 17:00  Phos  2.7     10-15  Phos  3.5     10-14  Mg     1.8     10-14                10-14-21 @ 07:01  -  10-15-21 @ 07:00  --------------------------------------------------------  IN: 600 mL / OUT: 2930 mL / NET: -2330 mL    10-15-21 @ 07:01  -  10-16-21 @ 06:54  --------------------------------------------------------  IN: 1250 mL / OUT: 2100 mL / NET: -850 mL    
PGY 3 Note  Patient seen and evaluated at bedside, doing well, no complaints. No acute events overnight. Patient is tolerating regular PO diet, voiding and ambulating without difficulty. Passing flatus, denies BM. Denies fever, chills, CP, SOB, severe abdominal pain, VB, urinary symptoms or LE pain/swelling. Pain well controlled on PO meds.    Physical exam:  Vital Signs Last 24 Hrs  T(C): 36.3 (17 Oct 2021 04:33), Max: 36.6 (16 Oct 2021 17:14)  T(F): 97.4 (17 Oct 2021 04:33), Max: 97.8 (16 Oct 2021 17:14)  HR: 72 (17 Oct 2021 04:33) (72 - 92)  BP: 99/52 (17 Oct 2021 04:33) (96/61 - 107/73)  RR: 18 (17 Oct 2021 04:33) (18 - 20)  SpO2: 97% (17 Oct 2021 04:33) (97% - 100%)    Gen: NAD, AOx3  CVS: RRR  Lungs: CTABL  Abdomen: Soft, nontender, non distended. No rebound, rigidity or guarding. Midline incision clean/dry/intact, no erythema/induration/drainage.  Pelvic: deferred, no blood on pad  Ext: No calf tenderness    MEDICATIONS  (STANDING):  acetaminophen   Tablet .. 1000 milliGRAM(s) Oral every 8 hours  influenza   Vaccine 0.5 milliLiter(s) IntraMuscular once  ketorolac   Injectable 30 milliGRAM(s) IV Push every 8 hours  melatonin 5 milliGRAM(s) Oral at bedtime  pantoprazole    Tablet 40 milliGRAM(s) Oral two times a day  simethicone 80 milliGRAM(s) Chew every 6 hours    MEDICATIONS  (PRN):  HYDROmorphone   Tablet 1 milliGRAM(s) Oral every 4 hours PRN Moderate Pain (4 - 6)  oxyCODONE    IR 5 milliGRAM(s) Oral every 4 hours PRN Moderate Pain (4 - 6)  senna 2 Tablet(s) Oral at bedtime PRN Constipation        LABS:                        9.9    7.09  )-----------( 184      ( 15 Oct 2021 06:38 )             30.8                         11.6   8.45  )-----------( 193      ( 14 Oct 2021 17:00 )             36.5       10-15-21 @ 06:38      138  |  104  |  8<L>  ----------------------------<  139<H>  3.6   |  22  |  0.7    10-14-21 @ 17:00      137  |  102  |  7<L>  ----------------------------<  150<H>  4.4   |  20  |  0.7        Ca    8.6      15 Oct 2021 06:38  Ca    8.9      14 Oct 2021 17:00  Phos  2.7     10-15  Phos  3.5     10-14  Mg     1.8     10-14                
PGY-4 Note:    Patient seen and examined. Pain well controlled at this time. No complaints at this time. Denies fever, chills, nausea, vomiting, chest pain, shortness of breath, severe abdominal pain, heavy vaginal bleeding.   On Humate P    Not Ambulating.   Denies flatus, Denies bowel movement.   Diet: Regular, tolearting PO  Voiding: Transurethral catheter  Lines:  Peripheral IV  NGT in place   Gilles[place]       Physical Exam:  Vital Signs:  T(C): 36.8 (10-15-21 @ 05:00), Max: 37.4 (10-14-21 @ 21:44)  HR: 105 (10-15-21 @ 05:00) (66 - 105)  BP: 116/64 (10-15-21 @ 05:00) (102/53 - 143/82)  RR: 18 (10-15-21 @ 05:00) (12 - 27)  SpO2: 99% (10-15-21 @ 05:00) (97% - 100%)    10-14-21 @ 07:01  -  10-15-21 @ 07:00  --------------------------------------------------------  IN: 0 mL / OUT: 1475 mL / NET: -1475 mL      Gen: NAD, A&Ox3  Heart: S1S2,RRR. No murmors.   Lungs: CTAB. Normal resiprations.   Abd: ND, soft, NT, BS+, incision c/d/i with steri strips in place,  dressing c/d/i  VE: Deferred, no active bleeding  Ext: SCDs, no edema or calf tenderness bilaterally    Labs:                        11.6   8.45  )-----------( 193      ( 14 Oct 2021 17:00 )             36.5       Medications:  acetaminophen   Tablet .. 1000 milliGRAM(s) Oral every 8 hours  HYDROmorphone   Tablet 1 milliGRAM(s) Oral every 4 hours PRN  influenza   Vaccine 0.5 milliLiter(s) IntraMuscular once  ketorolac   Injectable 30 milliGRAM(s) IV Push every 8 hours  lactated ringers. 1000 milliLiter(s) IV Continuous <Continuous>  melatonin 5 milliGRAM(s) Oral at bedtime  oxyCODONE    IR 5 milliGRAM(s) Oral every 4 hours PRN  senna 2 Tablet(s) Oral at bedtime PRN  simethicone 80 milliGRAM(s) Chew every 6 hours          
GENERAL SURGERY PROGRESS NOTE    Patient: ADAM SINGH , 35y ([birth year])Female   MRN: [id number]  Location: [address]  Visit: 10-14-21 Inpatient  Date: 10-15-21 @ 18:10    Hospital Day #: 3  Post-Op Day #: 2    Procedure/Dx/Injuries: s/p ex lap, KENDALL, left salpingectomy and fulguration of right fallopian tube, intraoperative general surgery consult for KENDALL    Events of past 24 hours: , patient passing gas, no BM. Ambulating and voiding without difficulty. Denies any pain          Vitals:   T(F): 96.8 (10-16-21 @ 01:36), Max: 99.3 (10-15-21 @ 22:22)  HR: 82 (10-16-21 @ 01:36)  BP: 97/53 (10-16-21 @ 01:36)  RR: 18 (10-16-21 @ 01:36)  SpO2: 97% (10-16-21 @ 01:36)      Diet, NPO  Diet, NPO:   Except Medications      Fluids: dextrose 5% + sodium chloride 0.45%.: Solution, 1000 milliLiter(s) infuse at 100 mL/Hr      I & O's:    10-14-21 @ 07:01  -  10-15-21 @ 07:00  --------------------------------------------------------  IN:    Lactated Ringers: 100 mL    Lactated Ringers: 500 mL  Total IN: 600 mL    OUT:    Indwelling Catheter - Urethral (mL): 1475 mL    Nasogastric/Oral tube (mL): 1455 mL  Total OUT: 2930 mL    Total NET: -2330 mL        Bowel Movement: : [] YES [] NO  Flatus: : [] YES [] NO    PHYSICAL EXAM:  General Appearance: NAD  HEENT: EOMI, sclera non-icteric.  Heart: RRR   Lungs: No increased work of breathing or accessory muscle use. Symmetric chest wall rise and fall.   Abdomen:  Soft, nontender, nondistended.   MSK/Extremities: Warm & well-perfused.   Skin: Warm, dry. No jaundice.   Incision/wound: healing well, dressings in place, clean, dry and intact    MEDICATIONS  (STANDING):  acetaminophen   Tablet .. 1000 milliGRAM(s) Oral every 8 hours  dextrose 5% + sodium chloride 0.45%. 1000 milliLiter(s) (100 mL/Hr) IV Continuous <Continuous>  influenza   Vaccine 0.5 milliLiter(s) IntraMuscular once  ketorolac   Injectable 30 milliGRAM(s) IV Push every 8 hours  melatonin 5 milliGRAM(s) Oral at bedtime  pantoprazole    Tablet 40 milliGRAM(s) Oral two times a day  simethicone 80 milliGRAM(s) Chew every 6 hours    MEDICATIONS  (PRN):  HYDROmorphone   Tablet 1 milliGRAM(s) Oral every 4 hours PRN Moderate Pain (4 - 6)  oxyCODONE    IR 5 milliGRAM(s) Oral every 4 hours PRN Moderate Pain (4 - 6)  senna 2 Tablet(s) Oral at bedtime PRN Constipation      DVT PROPHYLAXIS:   GI PROPHYLAXIS: pantoprazole    Tablet 40 milliGRAM(s) Oral two times a day    ANTICOAGULATION:   ANTIBIOTICS:            LAB/STUDIES:  Labs:  CAPILLARY BLOOD GLUCOSE                              9.9    7.09  )-----------( 184      ( 15 Oct 2021 06:38 )             30.8       Auto Neutrophil %: 75.8 % (10-15-21 @ 06:38)  Auto Immature Granulocyte %: 0.3 % (10-15-21 @ 06:38)    10-15    138  |  104  |  8<L>  ----------------------------<  139<H>  3.6   |  22  |  0.7      Calcium, Total Serum: 8.6 mg/dL (10-15-21 @ 06:38)      LFTs:         Coags:                        IMAGING:      ACCESS/ DEVICES:  [ ] Peripheral IV  [ ] Central Venous Line	[ ] R	[ ] L	[ ] IJ	[ ] Fem	[ ] SC	Placed:   [ ] Arterial Line		[ ] R	[ ] L	[ ] Fem	[ ] Rad	[ ] Ax	Placed:   [ ] PICC:					[ ] Mediport  [ ] Urinary Catheter,  Date Placed:   [ ] Chest tube: [ ] Right, [ ] Left  [ ] KYARA/Eliud Drains  [ ] Wound vac      BLUE TEAM SPECTRA #[id number]

## 2021-10-17 NOTE — DISCHARGE NOTE PROVIDER - NSDCFUADDINST_GEN_ALL_CORE_FT
Nothing in the vagina for 6 weeks. No tampons, no douching, no sex. No swimming, no tub-baths. May shower.  If fever 100.4F or greater, excessive vaginal bleeding, or severe abdominal pain, call your doctor or come to ED or call 911.  Please see your doctor in 2 weeks for post-operative visit.

## 2021-10-17 NOTE — DISCHARGE NOTE PROVIDER - NSDCMRMEDTOKEN_GEN_ALL_CORE_FT
acetaminophen 500 mg oral tablet: 2 tab(s) orally every 8 hours  oxyCODONE 5 mg oral tablet: 1 tab(s) orally every 4 hours, As needed, Moderate Pain (4 - 6) MDD:6 tablets  pantoprazole 40 mg oral delayed release tablet: 1 tab(s) orally 2 times a day  Prenatal 1 oral capsule: MULTIVIT  senna oral tablet: 2 tab(s) orally once a day (at bedtime) -Constipation MDD:2 tablets   Synthroid 25 mcg (0.025 mg) oral tablet: 0.5 tab(s) orally once a day  [place] 3 mg-0.02 mg oral tablet: 1 tab(s) orally once a day-OCP

## 2021-10-21 LAB — SURGICAL PATHOLOGY STUDY: SIGNIFICANT CHANGE UP

## 2021-10-22 ENCOUNTER — LABORATORY RESULT (OUTPATIENT)
Age: 36
End: 2021-10-22

## 2021-10-22 ENCOUNTER — OUTPATIENT (OUTPATIENT)
Dept: OUTPATIENT SERVICES | Facility: HOSPITAL | Age: 36
LOS: 1 days | Discharge: HOME | End: 2021-10-22

## 2021-10-22 ENCOUNTER — APPOINTMENT (OUTPATIENT)
Dept: INFUSION THERAPY | Facility: CLINIC | Age: 36
End: 2021-10-22

## 2021-10-22 VITALS
DIASTOLIC BLOOD PRESSURE: 55 MMHG | TEMPERATURE: 98.1 F | SYSTOLIC BLOOD PRESSURE: 111 MMHG | RESPIRATION RATE: 16 BRPM | HEART RATE: 75 BPM

## 2021-10-22 DIAGNOSIS — D68.0 VON WILLEBRAND DISEASE: ICD-10-CM

## 2021-10-22 DIAGNOSIS — Z98.890 OTHER SPECIFIED POSTPROCEDURAL STATES: Chronic | ICD-10-CM

## 2021-10-22 DIAGNOSIS — D50.9 IRON DEFICIENCY ANEMIA, UNSPECIFIED: ICD-10-CM

## 2021-10-22 DIAGNOSIS — Z90.49 ACQUIRED ABSENCE OF OTHER SPECIFIED PARTS OF DIGESTIVE TRACT: Chronic | ICD-10-CM

## 2021-10-22 LAB
HCT VFR BLD CALC: 32.8 %
HGB BLD-MCNC: 10.3 G/DL
MCHC RBC-ENTMCNC: 27.7 PG
MCHC RBC-ENTMCNC: 31.4 G/DL
MCV RBC AUTO: 88.2 FL
PLATELET # BLD AUTO: 237 K/UL
PMV BLD: 9.6 FL
RBC # BLD: 3.72 M/UL
RBC # FLD: 14 %
WBC # FLD AUTO: 7.4 K/UL

## 2021-10-22 RX ORDER — IRON SUCROSE 20 MG/ML
200 INJECTION, SOLUTION INTRAVENOUS ONCE
Refills: 0 | Status: COMPLETED | OUTPATIENT
Start: 2021-10-22 | End: 2021-10-22

## 2021-10-22 RX ADMIN — IRON SUCROSE 220 MILLIGRAM(S): 20 INJECTION, SOLUTION INTRAVENOUS at 16:06

## 2021-10-22 RX ADMIN — IRON SUCROSE 200 MILLIGRAM(S): 20 INJECTION, SOLUTION INTRAVENOUS at 16:38

## 2021-10-24 LAB — FERRITIN SERPL-MCNC: 236 NG/ML

## 2022-01-14 ENCOUNTER — LABORATORY RESULT (OUTPATIENT)
Age: 37
End: 2022-01-14

## 2022-01-14 ENCOUNTER — APPOINTMENT (OUTPATIENT)
Dept: HEMATOLOGY ONCOLOGY | Facility: CLINIC | Age: 37
End: 2022-01-14

## 2022-01-15 ENCOUNTER — EMERGENCY (EMERGENCY)
Facility: HOSPITAL | Age: 37
LOS: 0 days | Discharge: HOME | End: 2022-01-15
Attending: EMERGENCY MEDICINE | Admitting: EMERGENCY MEDICINE
Payer: COMMERCIAL

## 2022-01-15 VITALS
HEART RATE: 104 BPM | HEIGHT: 64 IN | TEMPERATURE: 98 F | RESPIRATION RATE: 17 BRPM | DIASTOLIC BLOOD PRESSURE: 67 MMHG | OXYGEN SATURATION: 100 % | SYSTOLIC BLOOD PRESSURE: 121 MMHG

## 2022-01-15 VITALS
RESPIRATION RATE: 18 BRPM | HEART RATE: 92 BPM | SYSTOLIC BLOOD PRESSURE: 102 MMHG | OXYGEN SATURATION: 100 % | DIASTOLIC BLOOD PRESSURE: 54 MMHG | TEMPERATURE: 98 F

## 2022-01-15 DIAGNOSIS — Z98.890 OTHER SPECIFIED POSTPROCEDURAL STATES: Chronic | ICD-10-CM

## 2022-01-15 DIAGNOSIS — R53.1 WEAKNESS: ICD-10-CM

## 2022-01-15 DIAGNOSIS — D64.9 ANEMIA, UNSPECIFIED: ICD-10-CM

## 2022-01-15 DIAGNOSIS — N93.9 ABNORMAL UTERINE AND VAGINAL BLEEDING, UNSPECIFIED: ICD-10-CM

## 2022-01-15 DIAGNOSIS — R42 DIZZINESS AND GIDDINESS: ICD-10-CM

## 2022-01-15 DIAGNOSIS — Z90.49 ACQUIRED ABSENCE OF OTHER SPECIFIED PARTS OF DIGESTIVE TRACT: Chronic | ICD-10-CM

## 2022-01-15 DIAGNOSIS — D68.0 VON WILLEBRAND DISEASE: ICD-10-CM

## 2022-01-15 LAB
ABO + RH PNL BLD: NORMAL
ALBUMIN SERPL ELPH-MCNC: 4.4 G/DL — SIGNIFICANT CHANGE UP (ref 3.5–5.2)
ALP SERPL-CCNC: 68 U/L — SIGNIFICANT CHANGE UP (ref 30–115)
ALT FLD-CCNC: 13 U/L — SIGNIFICANT CHANGE UP (ref 0–41)
ANION GAP SERPL CALC-SCNC: 14 MMOL/L — SIGNIFICANT CHANGE UP (ref 7–14)
APPEARANCE UR: CLEAR — SIGNIFICANT CHANGE UP
APTT BLD: 35 SEC — SIGNIFICANT CHANGE UP (ref 27–39.2)
AST SERPL-CCNC: 20 U/L — SIGNIFICANT CHANGE UP (ref 0–41)
BACTERIA # UR AUTO: NEGATIVE — SIGNIFICANT CHANGE UP
BASOPHILS # BLD AUTO: 0.03 K/UL — SIGNIFICANT CHANGE UP (ref 0–0.2)
BASOPHILS NFR BLD AUTO: 0.4 % — SIGNIFICANT CHANGE UP (ref 0–1)
BILIRUB SERPL-MCNC: 0.3 MG/DL — SIGNIFICANT CHANGE UP (ref 0.2–1.2)
BILIRUB UR-MCNC: NEGATIVE — SIGNIFICANT CHANGE UP
BLD GP AB SCN SERPL QL: NORMAL
BLD GP AB SCN SERPL QL: SIGNIFICANT CHANGE UP
BUN SERPL-MCNC: 9 MG/DL — LOW (ref 10–20)
CALCIUM SERPL-MCNC: 9.1 MG/DL — SIGNIFICANT CHANGE UP (ref 8.5–10.1)
CHLORIDE SERPL-SCNC: 99 MMOL/L — SIGNIFICANT CHANGE UP (ref 98–110)
CO2 SERPL-SCNC: 21 MMOL/L — SIGNIFICANT CHANGE UP (ref 17–32)
COLOR SPEC: COLORLESS — SIGNIFICANT CHANGE UP
CREAT SERPL-MCNC: 0.5 MG/DL — LOW (ref 0.7–1.5)
DIFF PNL FLD: ABNORMAL
EOSINOPHIL # BLD AUTO: 0.26 K/UL — SIGNIFICANT CHANGE UP (ref 0–0.7)
EOSINOPHIL NFR BLD AUTO: 3.3 % — SIGNIFICANT CHANGE UP (ref 0–8)
EPI CELLS # UR: 0 /HPF — SIGNIFICANT CHANGE UP (ref 0–5)
FERRITIN SERPL-MCNC: 48 NG/ML
GLUCOSE SERPL-MCNC: 93 MG/DL — SIGNIFICANT CHANGE UP (ref 70–99)
GLUCOSE UR QL: NEGATIVE — SIGNIFICANT CHANGE UP
HCT VFR BLD CALC: 23.2 % — LOW (ref 37–47)
HCT VFR BLD CALC: 24.3 %
HGB BLD-MCNC: 7.4 G/DL — LOW (ref 12–16)
HGB BLD-MCNC: 7.8 G/DL
HYALINE CASTS # UR AUTO: 0 /LPF — SIGNIFICANT CHANGE UP (ref 0–7)
IMM GRANULOCYTES NFR BLD AUTO: 0.5 % — HIGH (ref 0.1–0.3)
INR BLD: 1.02 RATIO — SIGNIFICANT CHANGE UP (ref 0.65–1.3)
IRON SATN MFR SERPL: 13 %
IRON SERPL-MCNC: 36 UG/DL
KETONES UR-MCNC: NEGATIVE — SIGNIFICANT CHANGE UP
LEUKOCYTE ESTERASE UR-ACNC: NEGATIVE — SIGNIFICANT CHANGE UP
LYMPHOCYTES # BLD AUTO: 2.61 K/UL — SIGNIFICANT CHANGE UP (ref 1.2–3.4)
LYMPHOCYTES # BLD AUTO: 33.5 % — SIGNIFICANT CHANGE UP (ref 20.5–51.1)
MCHC RBC-ENTMCNC: 28.2 PG — SIGNIFICANT CHANGE UP (ref 27–31)
MCHC RBC-ENTMCNC: 28.4 PG
MCHC RBC-ENTMCNC: 31.9 G/DL — LOW (ref 32–37)
MCHC RBC-ENTMCNC: 32.1 G/DL
MCV RBC AUTO: 88.4 FL
MCV RBC AUTO: 88.5 FL — SIGNIFICANT CHANGE UP (ref 81–99)
MONOCYTES # BLD AUTO: 0.43 K/UL — SIGNIFICANT CHANGE UP (ref 0.1–0.6)
MONOCYTES NFR BLD AUTO: 5.5 % — SIGNIFICANT CHANGE UP (ref 1.7–9.3)
NEUTROPHILS # BLD AUTO: 4.41 K/UL — SIGNIFICANT CHANGE UP (ref 1.4–6.5)
NEUTROPHILS NFR BLD AUTO: 56.8 % — SIGNIFICANT CHANGE UP (ref 42.2–75.2)
NITRITE UR-MCNC: NEGATIVE — SIGNIFICANT CHANGE UP
NRBC # BLD: 0 /100 WBCS — SIGNIFICANT CHANGE UP (ref 0–0)
PH UR: 6.5 — SIGNIFICANT CHANGE UP (ref 5–8)
PLATELET # BLD AUTO: 195 K/UL
PLATELET # BLD AUTO: 222 K/UL — SIGNIFICANT CHANGE UP (ref 130–400)
PMV BLD: 8.4 FL
POTASSIUM SERPL-MCNC: 4.2 MMOL/L — SIGNIFICANT CHANGE UP (ref 3.5–5)
POTASSIUM SERPL-SCNC: 4.2 MMOL/L — SIGNIFICANT CHANGE UP (ref 3.5–5)
PROT SERPL-MCNC: 6.9 G/DL — SIGNIFICANT CHANGE UP (ref 6–8)
PROT UR-MCNC: SIGNIFICANT CHANGE UP
PROTHROM AB SERPL-ACNC: 11.7 SEC — SIGNIFICANT CHANGE UP (ref 9.95–12.87)
RBC # BLD: 2.62 M/UL — LOW (ref 4.2–5.4)
RBC # BLD: 2.75 M/UL
RBC # FLD: 14.6 %
RBC # FLD: 14.9 % — HIGH (ref 11.5–14.5)
RBC CASTS # UR COMP ASSIST: 82 /HPF — HIGH (ref 0–4)
SODIUM SERPL-SCNC: 134 MMOL/L — LOW (ref 135–146)
SP GR SPEC: 1.01 — SIGNIFICANT CHANGE UP (ref 1.01–1.03)
TIBC SERPL-MCNC: 286 UG/DL
UIBC SERPL-MCNC: 250 UG/DL
UROBILINOGEN FLD QL: SIGNIFICANT CHANGE UP
WBC # BLD: 7.78 K/UL — SIGNIFICANT CHANGE UP (ref 4.8–10.8)
WBC # FLD AUTO: 7.7 K/UL
WBC # FLD AUTO: 7.78 K/UL — SIGNIFICANT CHANGE UP (ref 4.8–10.8)
WBC UR QL: 0 /HPF — SIGNIFICANT CHANGE UP (ref 0–5)

## 2022-01-15 PROCEDURE — 99284 EMERGENCY DEPT VISIT MOD MDM: CPT

## 2022-01-15 NOTE — ED PROVIDER NOTE - PATIENT PORTAL LINK FT
You can access the FollowMyHealth Patient Portal offered by [facility] by registering at the following website: [email or web address]. By joining [facility]’s FollowMyHealth portal, you will also be able to view your health information using other applications (apps) compatible with our system.

## 2022-01-15 NOTE — ED ADULT NURSE REASSESSMENT NOTE - NS ED NURSE REASSESS COMMENT FT1
patient completed 1 unti of RPBC.  Patient tolerated well, with no signs of transfusion reaction noted. Will continue to monitor.

## 2022-01-15 NOTE — ED PROVIDER NOTE - OBJECTIVE STATEMENT
36-year-old female history of von Willebrand's type II presenting with vaginal bleeding for the past 10 days associated with lightheadedness and generalized weakness.  Patient is currently on her period.  Vaginal bleeding is typical for patient's menstrual cycle.  Denies chest pain, shortness of breath, fever, urinary symptoms.

## 2022-01-15 NOTE — ED PROVIDER NOTE - CARE PROVIDER_API CALL
Maximiliano Ivan)  Internal Medicine; Medical Oncology  [address] [place] [address]  Phone: [phone]  Fax: [phone]  Follow Up Time: 1-3 Days

## 2022-01-15 NOTE — ED PROVIDER NOTE - CARE PLAN
Principal Discharge DX:	Anemia   1 Principal Discharge DX:	Anemia  Secondary Diagnosis:	Vaginal bleeding

## 2022-01-15 NOTE — ED PROVIDER NOTE - PROGRESS NOTE DETAILS
Hgb 7.4, will transfuse 2 units, plan d/c home. Hgb 7.4, will transfuse 2 units, plan d/c home.  According to Dr Kirby who performed pelvic exam, bleeding is minimal at present. EP; Patient appears very well, reports feeling better, 2nd unit is going in, she already has an appointment with Dr Ivan, on Monday. WF: 2u of prbc completed, pt NAD, no complaints. d/c and f/u with dr doherty.

## 2022-01-15 NOTE — ED ADULT NURSE REASSESSMENT NOTE - NS ED NURSE REASSESS COMMENT FT1
patient receiving 1 unit of PRBC, tolerating well with no signs of transfusion reaction noted.  Will continue to monitor.

## 2022-01-15 NOTE — ED ADULT TRIAGE NOTE - CHIEF COMPLAINT QUOTE
Pt has Von Willebrand disease. hx of low hbg and blood tranfusions. states she has been  having vaginal bleeding x 10 days. HGB 7.8 yesterday

## 2022-01-15 NOTE — ED PROVIDER NOTE - CLINICAL SUMMARY MEDICAL DECISION MAKING FREE TEXT BOX
35 yo female PMH Von Willebrand disease, anemia with h/o blood transfusions, heavy menses, ( used to be on birth control pills, but now is off as she is trying to conceive) presenting with fatigue and vaginal bleeding for 10 days.  Denies CP, SOB, abdominal pain, black or bloody stools , no hematuria, no other acute complaints..   Well-appearing well-nourished young female in  NAD, head AT/NC, PERRL, pale conjunctivae,   nml work of breathing, lungs CTA b/l, equal air entry, speaking full sentences, RRR, well-perfused extremities, distal pulses intact, abdomen soft, NT/ND, no leg edema or unilateral calf swelling, A&Ox3, no focal neuro deficits, nml mood and affect.  Labs, transfusion, plan to d.c home with hematology follow up.  Patient is happy withe the plan.

## 2022-01-15 NOTE — ED PROVIDER NOTE - ATTENDING CONTRIBUTION TO CARE
37 yo female PMH Von Willebrand disease, anemia with h/o blood transfusions, heavy menses, ( used to be on birth control pills, but now is off as she is trying to conceive) presenting with fatigue and vaginal bleeding for 10 days.  Denies CP, SOB, abdominal pain, black or bloody stools , no hematuria, no other acute complaints..   Well-appearing well-nourished young female in  NAD, head AT/NC, PERRL, pale conjunctivae,   nml work of breathing, lungs CTA b/l, equal air entry, speaking full sentences, RRR, well-perfused extremities, distal pulses intact, abdomen soft, NT/ND, no leg edema or unilateral calf swelling, A&Ox3, no focal neuro deficits, nml mood and affect.  Labs, transfusion, plan to d.c home with hematology follow up.  Patient is happy withe the plan.

## 2022-01-15 NOTE — ED PROVIDER NOTE - PHYSICAL EXAMINATION
CONSTITUTIONAL: Well-developed; well-nourished; in no acute distress.   SKIN: warm, dry  HEAD: Normocephalic; atraumatic.  EYES: PERRL, EOMI, pale conjunctiva  ENT: No nasal discharge; airway clear.  NECK: Supple; non tender.  CARD: S1, S2 normal; no murmurs, gallops, or rubs. tachy  RESP: No wheezes, rales or rhonchi.  ABD: soft ntnd  EXT: Normal ROM.  No clubbing, cyanosis or edema.   LYMPH: No acute cervical adenopathy.  NEURO: Alert, oriented, grossly unremarkable  PSYCH: Cooperative, appropriate.

## 2022-01-15 NOTE — ED ADULT NURSE REASSESSMENT NOTE - NS ED NURSE REASSESS COMMENT FT1
patient received from previous RN. Patient finishing 1 unit of PRBC transfusion. No transfusion reaction noted. Patient will be receiving 1 unit of PRBC transfusion blood bank aware and RN waiting for next unit of blood to arrive.  Will continue to monitor.

## 2022-01-17 ENCOUNTER — APPOINTMENT (OUTPATIENT)
Dept: INFUSION THERAPY | Facility: CLINIC | Age: 37
End: 2022-01-17

## 2022-01-17 ENCOUNTER — LABORATORY RESULT (OUTPATIENT)
Age: 37
End: 2022-01-17

## 2022-01-17 VITALS
HEART RATE: 82 BPM | RESPIRATION RATE: 16 BRPM | SYSTOLIC BLOOD PRESSURE: 119 MMHG | DIASTOLIC BLOOD PRESSURE: 59 MMHG | TEMPERATURE: 97.9 F

## 2022-01-17 LAB
CULTURE RESULTS: SIGNIFICANT CHANGE UP
HCT VFR BLD CALC: 31.6 %
HGB BLD-MCNC: 10.6 G/DL
MCHC RBC-ENTMCNC: 29.4 PG
MCHC RBC-ENTMCNC: 33.5 G/DL
MCV RBC AUTO: 87.5 FL
PLATELET # BLD AUTO: 187 K/UL
PMV BLD: 9.6 FL
RBC # BLD: 3.61 M/UL
RBC # FLD: 16.8 %
SPECIMEN SOURCE: SIGNIFICANT CHANGE UP
WBC # FLD AUTO: 7.14 K/UL

## 2022-01-17 RX ORDER — IRON SUCROSE 20 MG/ML
200 INJECTION, SOLUTION INTRAVENOUS ONCE
Refills: 0 | Status: COMPLETED | OUTPATIENT
Start: 2022-01-17 | End: 2022-01-17

## 2022-01-17 RX ADMIN — IRON SUCROSE 220 MILLIGRAM(S): 20 INJECTION, SOLUTION INTRAVENOUS at 15:00

## 2022-01-24 ENCOUNTER — APPOINTMENT (OUTPATIENT)
Dept: INFUSION THERAPY | Facility: CLINIC | Age: 37
End: 2022-01-24

## 2022-01-31 ENCOUNTER — APPOINTMENT (OUTPATIENT)
Dept: INFUSION THERAPY | Facility: CLINIC | Age: 37
End: 2022-01-31

## 2022-02-07 ENCOUNTER — APPOINTMENT (OUTPATIENT)
Dept: INFUSION THERAPY | Facility: CLINIC | Age: 37
End: 2022-02-07

## 2022-02-14 ENCOUNTER — APPOINTMENT (OUTPATIENT)
Dept: INFUSION THERAPY | Facility: CLINIC | Age: 37
End: 2022-02-14

## 2022-02-22 ENCOUNTER — APPOINTMENT (OUTPATIENT)
Dept: HEMATOLOGY ONCOLOGY | Facility: CLINIC | Age: 37
End: 2022-02-22

## 2022-02-28 ENCOUNTER — OUTPATIENT (OUTPATIENT)
Dept: OUTPATIENT SERVICES | Facility: HOSPITAL | Age: 37
LOS: 1 days | Discharge: HOME | End: 2022-02-28

## 2022-02-28 ENCOUNTER — APPOINTMENT (OUTPATIENT)
Dept: INFUSION THERAPY | Facility: CLINIC | Age: 37
End: 2022-02-28

## 2022-02-28 ENCOUNTER — APPOINTMENT (OUTPATIENT)
Dept: HEMATOLOGY ONCOLOGY | Facility: CLINIC | Age: 37
End: 2022-02-28
Payer: COMMERCIAL

## 2022-02-28 VITALS
OXYGEN SATURATION: 98 % | BODY MASS INDEX: 24.59 KG/M2 | SYSTOLIC BLOOD PRESSURE: 117 MMHG | HEIGHT: 64 IN | TEMPERATURE: 97.5 F | HEART RATE: 63 BPM | WEIGHT: 144 LBS | RESPIRATION RATE: 16 BRPM | DIASTOLIC BLOOD PRESSURE: 56 MMHG

## 2022-02-28 DIAGNOSIS — Z90.49 ACQUIRED ABSENCE OF OTHER SPECIFIED PARTS OF DIGESTIVE TRACT: Chronic | ICD-10-CM

## 2022-02-28 DIAGNOSIS — Z98.890 OTHER SPECIFIED POSTPROCEDURAL STATES: Chronic | ICD-10-CM

## 2022-02-28 DIAGNOSIS — D68.0 VON WILLEBRAND DISEASE: ICD-10-CM

## 2022-02-28 DIAGNOSIS — D50.9 IRON DEFICIENCY ANEMIA, UNSPECIFIED: ICD-10-CM

## 2022-02-28 PROCEDURE — 99214 OFFICE O/P EST MOD 30 MIN: CPT

## 2022-02-28 RX ORDER — IRON SUCROSE 20 MG/ML
200 INJECTION, SOLUTION INTRAVENOUS ONCE
Refills: 0 | Status: COMPLETED | OUTPATIENT
Start: 2022-02-28 | End: 2022-02-28

## 2022-02-28 RX ADMIN — IRON SUCROSE 220 MILLIGRAM(S): 20 INJECTION, SOLUTION INTRAVENOUS at 15:47

## 2022-02-28 NOTE — HISTORY OF PRESENT ILLNESS
[de-identified] : 34 year old female with Von Willebrand disease type 1 , DDAVP responsive , diagnosed in her teens with menorrhagia , severe anemia required transfusions and parenteral iron . \par She had only modest response to intranasal DDAVP and tranexamic acid however menstrual bleeding is controlled with OCPs and has not required transfusions in many years . Most recent Hb is normal . She continues to experience excessive bleeding with dental work . \par  [de-identified] : 09/25/2020 Today's encounter was carried out via telehealth at her request and in compliance with [facility] guidelines given the extreme circumstances surrounding Covid 19 out break . 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 Her menstrual bleeding is well controlled with OCPs but continues to report excessive bleeding with dental work ( improved slightly with tranexamic acid ) . Denies any other bleeding symptoms . \par She is now scheduled for egg harvest in early October . \par \par 4/20/2021: Patient presents for follow up. Underwent egg harvest with preprocedural DDAVP without bleeding complications. She is planned for outpatient salpingostomy with Dr. Abelardo Alicea on 5/12/2021. Per patient, procedure is estimated to last about 3-4 hours. If surgery is successful, plan is for embryo transfer about one month later. Patient remains on OCPs and reports that she has been spotting for the past month. Her menses are extremely heavy without the OCPs. She is taking oral iron and prenatal vitamins daily. Reports experiencing some nausea with oral iron use.\par \par 2/28/22- Patient is here for follow up visit for management of AMY and vW disease. She received 1 dose venofer Jan 2022 when her ferritin was 48. She is undergoing ivf treatment and did not want to get more iv iron at that time as she was having the embryo transfer. She has been having heavy periods and the last one lasted 16 days. She also takes PO iron 3 times a day. Her Repeat CBC Feb 22nd showed Hb 11.2 with ferritin 40. She takes tranaxemic acid during her periods.

## 2022-02-28 NOTE — ASSESSMENT
[FreeTextEntry1] : 36 yof with history of Von Willebrand disease Type 2A, DDAVP responsive , and iron deficiency anemia secondary to severe menorrhagia, previously requiring PRBC transfusions, currently controlled with OCPs and tranexamic acid.\par \par  Repeat CBC 02/2/22 showed Hb 11.2 with ferritin 40. Normal WBC and platelets. \par Received 1 dose Venofer Jan 2022 and also received 1 unit PRBC in Jan. \par \par Undergoing ivf treatments. \par Underwent Lt salphingectomy due to adhesions from prior appendectomy and received Humate-p infusions prophylactically.  \par \par --  Continue tranexamic acid during heavy days of menstrual cycle. \par -- Will prescribe nasal DDAVP as well. \par -- She will proceed with venofer iv. Will maintain on Venofer 1 dose every month. on po iron 2-3 X daily\par -- She will let us know with embryo transfer and we could avoid venofer at that time and if successful during 1 st trimester as well. \par \par RTC 1 month for CBC, ferritin and venofer infusion. \par Patient was seen and examined and discussed with Dr Ivan. \par \par

## 2022-02-28 NOTE — REVIEW OF SYSTEMS
[Easy Bleeding] : a tendency for easy bleeding [Negative] : Gastrointestinal [FreeTextEntry8] : menorrhagia without OCP use

## 2022-03-16 NOTE — DISCHARGE NOTE NURSING/CASE MANAGEMENT/SOCIAL WORK - NSDPACMPNY_GEN_ALL_CORE
Family
Note Text (......Xxx Chief Complaint.): This diagnosis correlates with the
Render Risk Assessment In Note?: no
Other (Free Text): Patient can cb if needing refill of metronidazole
Detail Level: Simple

## 2022-03-28 ENCOUNTER — APPOINTMENT (OUTPATIENT)
Dept: HEMATOLOGY ONCOLOGY | Facility: CLINIC | Age: 37
End: 2022-03-28

## 2022-07-25 ENCOUNTER — APPOINTMENT (OUTPATIENT)
Dept: OBGYN | Facility: CLINIC | Age: 37
End: 2022-07-25

## 2022-07-25 RX ORDER — AMINOCAPROIC ACID 500 MG/1
500 TABLET ORAL EVERY 8 HOURS
Qty: 42 | Refills: 0 | Status: DISCONTINUED | COMMUNITY
Start: 2022-07-25 | End: 2022-07-25

## 2022-07-26 ENCOUNTER — OUTPATIENT (OUTPATIENT)
Dept: OUTPATIENT SERVICES | Facility: HOSPITAL | Age: 37
LOS: 1 days | End: 2022-07-26
Payer: COMMERCIAL

## 2022-07-26 ENCOUNTER — TRANSCRIPTION ENCOUNTER (OUTPATIENT)
Age: 37
End: 2022-07-26

## 2022-07-26 VITALS
OXYGEN SATURATION: 100 % | SYSTOLIC BLOOD PRESSURE: 123 MMHG | DIASTOLIC BLOOD PRESSURE: 84 MMHG | WEIGHT: 134.7 LBS | TEMPERATURE: 98 F | HEIGHT: 64 IN | RESPIRATION RATE: 20 BRPM | HEART RATE: 79 BPM

## 2022-07-26 DIAGNOSIS — O02.1 MISSED ABORTION: ICD-10-CM

## 2022-07-26 DIAGNOSIS — Z98.890 OTHER SPECIFIED POSTPROCEDURAL STATES: Chronic | ICD-10-CM

## 2022-07-26 DIAGNOSIS — Z86.2 PERSONAL HISTORY OF DISEASES OF THE BLOOD AND BLOOD-FORMING ORGANS AND CERTAIN DISORDERS INVOLVING THE IMMUNE MECHANISM: ICD-10-CM

## 2022-07-26 DIAGNOSIS — Z29.9 ENCOUNTER FOR PROPHYLACTIC MEASURES, UNSPECIFIED: ICD-10-CM

## 2022-07-26 DIAGNOSIS — Z01.818 ENCOUNTER FOR OTHER PREPROCEDURAL EXAMINATION: ICD-10-CM

## 2022-07-26 DIAGNOSIS — Z11.52 ENCOUNTER FOR SCREENING FOR COVID-19: ICD-10-CM

## 2022-07-26 DIAGNOSIS — Z90.49 ACQUIRED ABSENCE OF OTHER SPECIFIED PARTS OF DIGESTIVE TRACT: Chronic | ICD-10-CM

## 2022-07-26 DIAGNOSIS — D68.0 VON WILLEBRAND DISEASE: ICD-10-CM

## 2022-07-26 LAB
ANION GAP SERPL CALC-SCNC: 13 MMOL/L — SIGNIFICANT CHANGE UP (ref 5–17)
BLD GP AB SCN SERPL QL: NEGATIVE — SIGNIFICANT CHANGE UP
BUN SERPL-MCNC: 7 MG/DL — SIGNIFICANT CHANGE UP (ref 7–23)
CALCIUM SERPL-MCNC: 9.4 MG/DL — SIGNIFICANT CHANGE UP (ref 8.4–10.5)
CHLORIDE SERPL-SCNC: 103 MMOL/L — SIGNIFICANT CHANGE UP (ref 96–108)
CO2 SERPL-SCNC: 22 MMOL/L — SIGNIFICANT CHANGE UP (ref 22–31)
CREAT SERPL-MCNC: 0.49 MG/DL — LOW (ref 0.5–1.3)
EGFR: 127 ML/MIN/1.73M2 — SIGNIFICANT CHANGE UP
GLUCOSE SERPL-MCNC: 129 MG/DL — HIGH (ref 70–99)
HCT VFR BLD CALC: 39.3 % — SIGNIFICANT CHANGE UP (ref 34.5–45)
HGB BLD-MCNC: 12.6 G/DL — SIGNIFICANT CHANGE UP (ref 11.5–15.5)
MCHC RBC-ENTMCNC: 27.8 PG — SIGNIFICANT CHANGE UP (ref 27–34)
MCHC RBC-ENTMCNC: 32.1 GM/DL — SIGNIFICANT CHANGE UP (ref 32–36)
MCV RBC AUTO: 86.8 FL — SIGNIFICANT CHANGE UP (ref 80–100)
NRBC # BLD: 0 /100 WBCS — SIGNIFICANT CHANGE UP (ref 0–0)
PLATELET # BLD AUTO: 195 K/UL — SIGNIFICANT CHANGE UP (ref 150–400)
POTASSIUM SERPL-MCNC: 3.7 MMOL/L — SIGNIFICANT CHANGE UP (ref 3.5–5.3)
POTASSIUM SERPL-SCNC: 3.7 MMOL/L — SIGNIFICANT CHANGE UP (ref 3.5–5.3)
RBC # BLD: 4.53 M/UL — SIGNIFICANT CHANGE UP (ref 3.8–5.2)
RBC # FLD: 15.5 % — HIGH (ref 10.3–14.5)
RH IG SCN BLD-IMP: POSITIVE — SIGNIFICANT CHANGE UP
SARS-COV-2 RNA SPEC QL NAA+PROBE: SIGNIFICANT CHANGE UP
SODIUM SERPL-SCNC: 138 MMOL/L — SIGNIFICANT CHANGE UP (ref 135–145)
WBC # BLD: 5.96 K/UL — SIGNIFICANT CHANGE UP (ref 3.8–10.5)
WBC # FLD AUTO: 5.96 K/UL — SIGNIFICANT CHANGE UP (ref 3.8–10.5)

## 2022-07-26 PROCEDURE — 86901 BLOOD TYPING SEROLOGIC RH(D): CPT

## 2022-07-26 PROCEDURE — 86900 BLOOD TYPING SEROLOGIC ABO: CPT

## 2022-07-26 PROCEDURE — 80048 BASIC METABOLIC PNL TOTAL CA: CPT

## 2022-07-26 PROCEDURE — C9803: CPT

## 2022-07-26 PROCEDURE — 86850 RBC ANTIBODY SCREEN: CPT

## 2022-07-26 PROCEDURE — U0003: CPT

## 2022-07-26 PROCEDURE — G0463: CPT

## 2022-07-26 PROCEDURE — 85027 COMPLETE CBC AUTOMATED: CPT

## 2022-07-26 PROCEDURE — U0005: CPT

## 2022-07-26 RX ORDER — LEVOTHYROXINE SODIUM 125 MCG
0.5 TABLET ORAL
Qty: 0 | Refills: 0 | DISCHARGE

## 2022-07-26 RX ORDER — SODIUM CHLORIDE 9 MG/ML
3 INJECTION INTRAMUSCULAR; INTRAVENOUS; SUBCUTANEOUS EVERY 8 HOURS
Refills: 0 | Status: DISCONTINUED | OUTPATIENT
Start: 2022-07-27 | End: 2022-07-27

## 2022-07-26 RX ORDER — DROSPIRENONE AND ETHINYL ESTRADIOL 0.03MG-3MG
1 KIT ORAL
Qty: 0 | Refills: 0 | DISCHARGE

## 2022-07-26 RX ORDER — LIDOCAINE HCL 20 MG/ML
0.2 VIAL (ML) INJECTION ONCE
Refills: 0 | Status: DISCONTINUED | OUTPATIENT
Start: 2022-07-27 | End: 2022-07-27

## 2022-07-26 NOTE — H&P PST ADULT - NSICDXPASTSURGICALHX_GEN_ALL_CORE_FT
PAST SURGICAL HISTORY:  History of appendectomy 15 y/o    S/P laparoscopy 7/2021     PAST SURGICAL HISTORY:  History of appendectomy 15 y/o    S/P laparoscopy 7/2021, 10/2021

## 2022-07-26 NOTE — H&P PST ADULT - ATTENDING COMMENTS
Missed AB for suction curettage  Hx VonWillibrands - for pre-treatment with DDAVP per hematology recommendation  Will also rx with TXA and TR cytotec  All considerations discussed with pt and

## 2022-07-26 NOTE — H&P PST ADULT - ASSESSMENT
LORELEII VTE 2.0 SCORE [CLOT updated 2019]    AGE RELATED RISK FACTORS                                                       MOBILITY RELATED FACTORS  [ ] Age 41-60 years                                            (1 Point)                    [ ] Bed rest                                                        (1 Point)  [ ] Age: 61-74 years                                           (2 Points)                  [ ] Plaster cast                                                   (2 Points)  [ ] Age= 75 years                                              (3 Points)                    [ ] Bed bound for more than 72 hours                 (2 Points)    DISEASE RELATED RISK FACTORS                                               GENDER SPECIFIC FACTORS  [ ] Edema in the lower extremities                       (1 Point)              [x ] Pregnancy                                                     (1 Point)  [ ] Varicose veins                                               (1 Point)                     [ ] Post-partum < 6 weeks                                   (1 Point)             [ ] BMI > 25 Kg/m2                                            (1 Point)                     [ ] Hormonal therapy  or oral contraception          (1 Point)                 [ ] Sepsis (in the previous month)                        (1 Point)               [ ] History of pregnancy complications                 (1 point)  [ ] Pneumonia or serious lung disease                                               [ ] Unexplained or recurrent                     (1 Point)           (in the previous month)                               (1 Point)  [ ] Abnormal pulmonary function test                     (1 Point)                 SURGERY RELATED RISK FACTORS  [ ] Acute myocardial infarction                              (1 Point)               [ ]  Section                                             (1 Point)  [ ] Congestive heart failure (in the previous month)  (1 Point)      [x ] Minor surgery                                                  (1 Point)   [ ] Inflammatory bowel disease                             (1 Point)               [ ] Arthroscopic surgery                                        (2 Points)  [ ] Central venous access                                      (2 Points)                [ ] General surgery lasting more than 45 minutes (2 points)  [ ] Malignancy- Present or previous                   (2 Points)                [ ] Elective arthroplasty                                         (5 points)    [ ] Stroke (in the previous month)                          (5 Points)                                                                                                                                                           HEMATOLOGY RELATED FACTORS                                                 TRAUMA RELATED RISK FACTORS  [ ] Prior episodes of VTE                                     (3 Points)                [ ] Fracture of the hip, pelvis, or leg                       (5 Points)  [ ] Positive family history for VTE                         (3 Points)             [ ] Acute spinal cord injury (in the previous month)  (5 Points)  [ ] Prothrombin 35579 A                                     (3 Points)               [ ] Paralysis  (less than 1 month)                             (5 Points)  [ ] Factor V Leiden                                             (3 Points)                  [ ] Multiple Trauma within 1 month                        (5 Points)  [ ] Lupus anticoagulants                                     (3 Points)                                                           [ ] Anticardiolipin antibodies                               (3 Points)                                                       [ ] High homocysteine in the blood                      (3 Points)                                             [ x] Other congenital or acquired thrombophilia      (3 Points)                                                [ ] Heparin induced thrombocytopenia                  (3 Points)                                     Total Score [      5    ]

## 2022-07-26 NOTE — H&P PST ADULT - HISTORY OF PRESENT ILLNESS
35 yo female with VW, AMY, IVF 6/3/22, no growth after 9 weeks, pt feeling fine.  Now for D&C.         COVID 7/26/22 [facility]   covid 3/21 no hospitalization 35 yo female with VW (s/p last tranfusion 1/2022) , AMY, IVF 6/3/22, no growth after 9 weeks, NO Fetal heartbeat, pt feeling fine.  Now for D&C.   Pt has been on synthroid during pregnancy.   Last hematology visit 2/2022, note in chart.         COVID 7/26/22 [facility]   covid 3/21 no hospitalization

## 2022-07-26 NOTE — H&P PST ADULT - FALL HARM RISK - UNIVERSAL INTERVENTIONS
Called patient x3, no answer, left message asking patient to return call to verify pharmacy [facility] [address] as preference pharmacy.  Thank you, Mayelin MCDANIELS    
Patient is calling back regarding the message [facility] [phone]  
She meets criteria for prescriptions for both:    -  Folic acid 1 mg daily for diagnosis of alcohol abuse    - Potassium chloride 20 meq supplement daily given recommendation for K+ to be > 4 for patients with afib.  It was 3.9 at discharge.      Can you confirm what pharmacy to send these medications to?    Martha Shah MD    
TRANSITIONAL CARE MANAGEMENT - HOSPITAL DISCHARGE FOLLOW-UP    Contacted Ms. Crouch regarding follow-up for urinary tract infection after hospital discharge. She was discharged from the hospital on 6/14/21. Review of the After Visit Summary from the recent hospitalization indicates that the patient needs to follow up with pcp.    She feels that she is doing fairly well at home.   Her diet concern is none and poor appetite. Overall, the patient is not eating well.   Ambulation: staying the same  Fever: is not present  Pain: she is experiencing pain in the feet and back pain. The pain is perceived as moderate (4-6 pain scale)  Activities of Daily Living (global): Partial assistance   Patient states that she does and does not have sufficient family support. She feels that she is not able to ask for assistance when needed.     Additional patient/family concerns: None .    Discharge medications were verified with the patient-started on amlodipine and hydroxyzine.  Patient is requesting provider prescribe her folic acid and potassium because she was on that in the hospital.  Informed patient I'm unsure if it can be ordered for that reason but will send a request to provider.  She is fully compliant with the medication regimen prescribed at the time of discharge. She reports that she is not experiencing any medication side effects.    Upon discharge, the patient was to receive no additional services.     Advance Directive:   The patient has the following documents:  No Advance Directives on file. Patient offered documents.    Patient has an appointment on 6/23/21 at 1250 with Michelle Obregon NP. Ms. Crouch was reminded about the importance of keeping this appointment.     
Bed in lowest position, wheels locked, appropriate side rails in place/Call bell, personal items and telephone in reach/Instruct patient to call for assistance before getting out of bed or chair/Non-slip footwear when patient is out of bed/[place] to call system/Physically safe environment - no spills, clutter or unnecessary equipment/Purposeful Proactive Rounding/Room/bathroom lighting operational, light cord in reach

## 2022-07-27 ENCOUNTER — TRANSCRIPTION ENCOUNTER (OUTPATIENT)
Age: 37
End: 2022-07-27

## 2022-07-27 ENCOUNTER — RESULT REVIEW (OUTPATIENT)
Age: 37
End: 2022-07-27

## 2022-07-27 ENCOUNTER — OUTPATIENT (OUTPATIENT)
Dept: INPATIENT UNIT | Facility: HOSPITAL | Age: 37
LOS: 1 days | Discharge: HOME | End: 2022-07-27
Payer: COMMERCIAL

## 2022-07-27 VITALS
HEART RATE: 82 BPM | SYSTOLIC BLOOD PRESSURE: 105 MMHG | RESPIRATION RATE: 18 BRPM | OXYGEN SATURATION: 97 % | DIASTOLIC BLOOD PRESSURE: 61 MMHG

## 2022-07-27 VITALS
HEIGHT: 64 IN | HEART RATE: 63 BPM | SYSTOLIC BLOOD PRESSURE: 104 MMHG | TEMPERATURE: 98 F | DIASTOLIC BLOOD PRESSURE: 69 MMHG | WEIGHT: 134.7 LBS | OXYGEN SATURATION: 100 % | RESPIRATION RATE: 18 BRPM

## 2022-07-27 DIAGNOSIS — D68.0 VON WILLEBRAND DISEASE: ICD-10-CM

## 2022-07-27 DIAGNOSIS — Z98.890 OTHER SPECIFIED POSTPROCEDURAL STATES: Chronic | ICD-10-CM

## 2022-07-27 DIAGNOSIS — O02.1 MISSED ABORTION: ICD-10-CM

## 2022-07-27 DIAGNOSIS — Z01.818 ENCOUNTER FOR OTHER PREPROCEDURAL EXAMINATION: ICD-10-CM

## 2022-07-27 DIAGNOSIS — Z90.49 ACQUIRED ABSENCE OF OTHER SPECIFIED PARTS OF DIGESTIVE TRACT: Chronic | ICD-10-CM

## 2022-07-27 LAB — RH IG SCN BLD-IMP: POSITIVE — SIGNIFICANT CHANGE UP

## 2022-07-27 PROCEDURE — 88305 TISSUE EXAM BY PATHOLOGIST: CPT

## 2022-07-27 PROCEDURE — 88305 TISSUE EXAM BY PATHOLOGIST: CPT | Mod: 26

## 2022-07-27 PROCEDURE — 88233 TISSUE CULTURE SKIN/BIOPSY: CPT

## 2022-07-27 PROCEDURE — 59820 CARE OF MISCARRIAGE: CPT

## 2022-07-27 RX ORDER — DIPHENHYDRAMINE HCL 50 MG
12.5 CAPSULE ORAL ONCE
Refills: 0 | Status: COMPLETED | OUTPATIENT
Start: 2022-07-27 | End: 2022-07-27

## 2022-07-27 RX ORDER — DESMOPRESSIN ACETATE 0.1 MG/1
20 TABLET ORAL ONCE
Refills: 0 | Status: DISCONTINUED | OUTPATIENT
Start: 2022-07-27 | End: 2022-07-27

## 2022-07-27 RX ORDER — FAMOTIDINE 10 MG/ML
20 INJECTION INTRAVENOUS ONCE
Refills: 0 | Status: COMPLETED | OUTPATIENT
Start: 2022-07-27 | End: 2022-07-27

## 2022-07-27 RX ORDER — ONDANSETRON 8 MG/1
4 TABLET, FILM COATED ORAL ONCE
Refills: 0 | Status: DISCONTINUED | OUTPATIENT
Start: 2022-07-27 | End: 2022-07-27

## 2022-07-27 RX ORDER — DESMOPRESSIN ACETATE 0.1 MG/1
22 TABLET ORAL ONCE
Refills: 0 | Status: COMPLETED | OUTPATIENT
Start: 2022-07-27 | End: 2022-07-27

## 2022-07-27 RX ORDER — ONDANSETRON 8 MG/1
1 TABLET, FILM COATED ORAL
Qty: 2 | Refills: 0
Start: 2022-07-27

## 2022-07-27 RX ORDER — HYDROMORPHONE HYDROCHLORIDE 2 MG/ML
0.5 INJECTION INTRAMUSCULAR; INTRAVENOUS; SUBCUTANEOUS
Refills: 0 | Status: DISCONTINUED | OUTPATIENT
Start: 2022-07-27 | End: 2022-07-27

## 2022-07-27 RX ORDER — TRANEXAMIC ACID 100 MG/ML
1000 INJECTION, SOLUTION INTRAVENOUS ONCE
Refills: 0 | Status: DISCONTINUED | OUTPATIENT
Start: 2022-07-27 | End: 2022-07-27

## 2022-07-27 RX ADMIN — Medication 12.5 MILLIGRAM(S): at 13:34

## 2022-07-27 RX ADMIN — FAMOTIDINE 20 MILLIGRAM(S): 10 INJECTION INTRAVENOUS at 13:34

## 2022-07-27 RX ADMIN — DESMOPRESSIN ACETATE 111 MICROGRAM(S): 0.1 TABLET ORAL at 11:14

## 2022-07-27 NOTE — PRE-ANESTHESIA EVALUATION ADULT - NSANTHADDINFOFT_GEN_ALL_CORE
Discussed risks and benefits of GA with patient including n/v, sore throat, dental injury, and cardiopulmonary complications.  All questions answered.

## 2022-07-27 NOTE — CHART NOTE - NSCHARTNOTEFT_GEN_A_CORE
R2 OBGYN POST-OP CHECK    S: Patient seen and evaluated at bedside.  Pt awake and alert resting comfortably in chair. Patient has used 1 pad (not saturated) in 2 hrs. Patient reports pain controlled with analgesia. Pt denies N/V, SOB, CP, palpitations, fever/chills. Tolerating clears.  She is ambulating and voiding spontaneously.    O:   T(C): 36.4 (07-27-22 @ 15:00), Max: 36.4 (07-27-22 @ 15:00)  HR: 63 (07-27-22 @ 15:00) (59 - 90)  BP: 114/64 (07-27-22 @ 15:00) (109/59 - 133/74)  RR: 16 (07-27-22 @ 15:00) (15 - 18)  SpO2: 97% (07-27-22 @ 15:00) (97% - 100%)  Wt(kg): --  I&O's Summary    27 Jul 2022 07:01  -  27 Jul 2022 15:22  --------------------------------------------------------  IN: 0 mL / OUT: 350 mL / NET: -350 mL        Gen: Resting comfortably in bed, NAD  CV: S1S2, RRR  Lungs: CTA B/L  Abd: soft, non-tender, occasional BS x 4 quadrants.   : scant blood on pad that has been in place for 15 min.  Ext:  non-tender b/l, no edema        A/P: 36y Female h/o Von Willebrand's s/p suction D&C for missed Ab. EBL was 10, DDAVP used in OR. Patient is doing well, hemodynamically stable with appropriately light vaginal bleeding.    Neuro: PO Tylenol PRN  CV: Hemodynamically stable.   Pulm: Saturating well on room air.    GI: Advance to regular diet. Anti-emetics PRN.  : Voiding spontaneously. Vaginal bleeding is light  Heme: Pt has TXA Rx from hematologist to take if heavy vaginal bleeding  Dispo: Discharge from PACU and f/u with in office in 1 month.    d/w Dr. Yisel Willis, PGY2

## 2022-07-27 NOTE — ASU DISCHARGE PLAN (ADULT/PEDIATRIC) - CARE PROVIDER_API CALL
Daryl Morillo J  OBST[facility]RICS AND GYNECOLOGY  [address] [place] [address]  Phone: [phone]  Fax: [phone]  Follow Up Time: 1 month

## 2022-07-27 NOTE — PATIENT PROFILE ADULT - HAVE YOU HAD A SECOND COVID-19 BOOSTER?
2019  EMPLOYEE INFORMATION: EMPLOYER INFORMATION:   NAME: Titus Warner [employer]   : 1956 [phone]   DATE OF INJURY/EVENT: 2019           Location: [facility]   Treating Provider: Mau Ang DO  Time In:  8:28 AM Time Out:  9:34 AM      DIAGNOSIS:   1. Open wound of right ring finger without damage to nail, initial encounter    2. Open wound of right little finger without damage to nail, initial encounter      STATUS: This injury is determined to be WORK RELATED.  RETURN TO WORK:  Employee may return to work with restrictions.     Return Date: 2019          RESTRICTIONS:   Restrictions are to be followed at work and at home.  Restrictions are in effect until next follow-up visit.  Keep the wound clean, dry and covered.  No use of the right hand at all and employee to keep the area covered with the wrap at all times.   No forceful grasping with the right hand. Left hand work only.   TREATMENT PLAN:  Medications for this injury/condition: None   Diagnostic Testing: None  Instructions:   Keep the area clean at all times. Watch for any increased swelling and or redness and if noted to be seen sooner.   NEXT RETURN VISIT: 2019 @ 8:00 AM  Thank you for the privilege of providing medical care for this injury/condition.  If there are any questions, please call the occupational health clinic at Dept: [phone].  Electronically signed on 2019 at 9:28 AM by:   Mau Ang DO   [facility]  
No

## 2022-07-27 NOTE — ASU DISCHARGE PLAN (ADULT/PEDIATRIC) - ASU DC SPECIAL INSTRUCTIONSFT
Regular diet as tolerated, regular activity as tolerated. Nothing per vagina: no intercourse, tampons or douching for 4 weeks.  Call your provider if you experience fevers, chills, worsening abdominal pain, inability to urinate or worsening vaginal bleeding.  Follow up with your provider in 4 weeks.

## 2022-07-27 NOTE — PRE-ANESTHESIA EVALUATION ADULT - NSANTHPMHFT_GEN_ALL_CORE
37 yo female with VW (s/p last tranfusion 1/2022) , AMY, IVF 6/3/22, no growth after 9 weeks, NO Fetal heartbeat, pt feeling fine.  Now for D&C.   Pt has been on synthroid during pregnancy.   Last hematology visit 2/2022, note in chart.

## 2022-07-27 NOTE — PATIENT PROFILE ADULT - FALL HARM RISK - UNIVERSAL INTERVENTIONS
Bed in lowest position, wheels locked, appropriate side rails in place/Call bell, personal items and telephone in reach/Instruct patient to call for assistance before getting out of bed or chair/Non-slip footwear when patient is out of bed/[place] to call system/Physically safe environment - no spills, clutter or unnecessary equipment/Purposeful Proactive Rounding/Room/bathroom lighting operational, light cord in reach

## 2022-07-27 NOTE — ASU DISCHARGE PLAN (ADULT/PEDIATRIC) - NS MD DC FALL RISK RISK
For information on Fall & Injury Prevention, visit: [email or web address] OR  [email or web address] OR  https://www.cdc.gov/steadi/patient.html

## 2022-08-02 LAB — SURGICAL PATHOLOGY STUDY: SIGNIFICANT CHANGE UP

## 2022-08-04 ENCOUNTER — APPOINTMENT (OUTPATIENT)
Dept: HEMATOLOGY ONCOLOGY | Facility: CLINIC | Age: 37
End: 2022-08-04

## 2022-08-05 LAB — CHROM ANALY OVERALL INTERP SPEC-IMP: SIGNIFICANT CHANGE UP

## 2022-08-15 ENCOUNTER — INPATIENT (INPATIENT)
Facility: HOSPITAL | Age: 37
LOS: 0 days | Discharge: HOME | DRG: 760 | End: 2022-08-16
Attending: INTERNAL MEDICINE | Admitting: INTERNAL MEDICINE
Payer: COMMERCIAL

## 2022-08-15 VITALS
OXYGEN SATURATION: 99 % | HEART RATE: 79 BPM | TEMPERATURE: 98 F | SYSTOLIC BLOOD PRESSURE: 87 MMHG | DIASTOLIC BLOOD PRESSURE: 62 MMHG | HEIGHT: 64 IN | RESPIRATION RATE: 18 BRPM | WEIGHT: 134.04 LBS

## 2022-08-15 DIAGNOSIS — N93.9 ABNORMAL UTERINE AND VAGINAL BLEEDING, UNSPECIFIED: ICD-10-CM

## 2022-08-15 LAB
ALBUMIN SERPL ELPH-MCNC: 3.4 G/DL — SIGNIFICANT CHANGE UP (ref 3.3–5)
ALP SERPL-CCNC: 64 U/L — SIGNIFICANT CHANGE UP (ref 40–120)
ALT FLD-CCNC: 16 U/L — SIGNIFICANT CHANGE UP (ref 12–78)
ANION GAP SERPL CALC-SCNC: 6 MMOL/L — SIGNIFICANT CHANGE UP (ref 5–17)
AST SERPL-CCNC: 13 U/L — LOW (ref 15–37)
BILIRUB SERPL-MCNC: 0.7 MG/DL — SIGNIFICANT CHANGE UP (ref 0.2–1.2)
BUN SERPL-MCNC: 11 MG/DL — SIGNIFICANT CHANGE UP (ref 7–23)
CALCIUM SERPL-MCNC: 8.6 MG/DL — SIGNIFICANT CHANGE UP (ref 8.5–10.1)
CHLORIDE SERPL-SCNC: 107 MMOL/L — SIGNIFICANT CHANGE UP (ref 96–108)
CO2 SERPL-SCNC: 25 MMOL/L — SIGNIFICANT CHANGE UP (ref 22–31)
CREAT SERPL-MCNC: 0.46 MG/DL — LOW (ref 0.5–1.3)
EGFR: 129 ML/MIN/1.73M2 — SIGNIFICANT CHANGE UP
FERRITIN SERPL-MCNC: 23 NG/ML — SIGNIFICANT CHANGE UP (ref 15–150)
FOLATE SERPL-MCNC: >20 NG/ML — SIGNIFICANT CHANGE UP
GLUCOSE SERPL-MCNC: 120 MG/DL — HIGH (ref 70–99)
HCG SERPL-ACNC: 17 MIU/ML — HIGH
HCT VFR BLD CALC: 24.1 % — LOW (ref 34.5–45)
HCT VFR BLD CALC: 32 % — LOW (ref 34.5–45)
HGB BLD-MCNC: 10.3 G/DL — LOW (ref 11.5–15.5)
HGB BLD-MCNC: 7.7 G/DL — LOW (ref 11.5–15.5)
IRON SATN MFR SERPL: 19 % — SIGNIFICANT CHANGE UP (ref 14–50)
IRON SATN MFR SERPL: 48 UG/DL — SIGNIFICANT CHANGE UP (ref 30–160)
MCHC RBC-ENTMCNC: 28.4 PG — SIGNIFICANT CHANGE UP (ref 27–34)
MCHC RBC-ENTMCNC: 32.2 GM/DL — SIGNIFICANT CHANGE UP (ref 32–36)
MCV RBC AUTO: 88.2 FL — SIGNIFICANT CHANGE UP (ref 80–100)
NRBC # BLD: 0 /100 WBCS — SIGNIFICANT CHANGE UP (ref 0–0)
PLATELET # BLD AUTO: 216 K/UL — SIGNIFICANT CHANGE UP (ref 150–400)
POTASSIUM SERPL-MCNC: 3.9 MMOL/L — SIGNIFICANT CHANGE UP (ref 3.5–5.3)
POTASSIUM SERPL-SCNC: 3.9 MMOL/L — SIGNIFICANT CHANGE UP (ref 3.5–5.3)
PROT SERPL-MCNC: 6.7 G/DL — SIGNIFICANT CHANGE UP (ref 6–8.3)
RBC # BLD: 3.63 M/UL — LOW (ref 3.8–5.2)
RBC # FLD: 14.7 % — HIGH (ref 10.3–14.5)
SARS-COV-2 RNA SPEC QL NAA+PROBE: SIGNIFICANT CHANGE UP
SODIUM SERPL-SCNC: 138 MMOL/L — SIGNIFICANT CHANGE UP (ref 135–145)
TIBC SERPL-MCNC: 249 UG/DL — SIGNIFICANT CHANGE UP (ref 220–430)
UIBC SERPL-MCNC: 201 UG/DL — SIGNIFICANT CHANGE UP (ref 110–370)
VIT B12 SERPL-MCNC: 541 PG/ML — SIGNIFICANT CHANGE UP (ref 232–1245)
WBC # BLD: 9.31 K/UL — SIGNIFICANT CHANGE UP (ref 3.8–10.5)
WBC # FLD AUTO: 9.31 K/UL — SIGNIFICANT CHANGE UP (ref 3.8–10.5)

## 2022-08-15 PROCEDURE — 76830 TRANSVAGINAL US NON-OB: CPT | Mod: 26

## 2022-08-15 PROCEDURE — 99223 1ST HOSP IP/OBS HIGH 75: CPT

## 2022-08-15 PROCEDURE — 99285 EMERGENCY DEPT VISIT HI MDM: CPT

## 2022-08-15 RX ORDER — SODIUM CHLORIDE 9 MG/ML
1000 INJECTION INTRAMUSCULAR; INTRAVENOUS; SUBCUTANEOUS
Refills: 0 | Status: DISCONTINUED | OUTPATIENT
Start: 2022-08-15 | End: 2022-08-16

## 2022-08-15 RX ORDER — IRON SUCROSE 20 MG/ML
200 INJECTION, SOLUTION INTRAVENOUS EVERY 24 HOURS
Refills: 0 | Status: DISCONTINUED | OUTPATIENT
Start: 2022-08-15 | End: 2022-08-16

## 2022-08-15 RX ORDER — ACETAMINOPHEN 500 MG
650 TABLET ORAL EVERY 6 HOURS
Refills: 0 | Status: DISCONTINUED | OUTPATIENT
Start: 2022-08-15 | End: 2022-08-16

## 2022-08-15 RX ORDER — PREGABALIN 225 MG/1
1000 CAPSULE ORAL DAILY
Refills: 0 | Status: DISCONTINUED | OUTPATIENT
Start: 2022-08-15 | End: 2022-08-16

## 2022-08-15 RX ORDER — FAMOTIDINE 10 MG/ML
20 INJECTION INTRAVENOUS DAILY
Refills: 0 | Status: DISCONTINUED | OUTPATIENT
Start: 2022-08-15 | End: 2022-08-16

## 2022-08-15 RX ORDER — LEVOTHYROXINE SODIUM 125 MCG
25 TABLET ORAL DAILY
Refills: 0 | Status: DISCONTINUED | OUTPATIENT
Start: 2022-08-15 | End: 2022-08-16

## 2022-08-15 RX ADMIN — PREGABALIN 1000 MICROGRAM(S): 225 CAPSULE ORAL at 17:17

## 2022-08-15 RX ADMIN — IRON SUCROSE 200 MILLIGRAM(S): 20 INJECTION, SOLUTION INTRAVENOUS at 17:17

## 2022-08-15 RX ADMIN — FAMOTIDINE 20 MILLIGRAM(S): 10 INJECTION INTRAVENOUS at 18:30

## 2022-08-15 RX ADMIN — SODIUM CHLORIDE 100 MILLILITER(S): 9 INJECTION INTRAMUSCULAR; INTRAVENOUS; SUBCUTANEOUS at 17:18

## 2022-08-15 NOTE — PATIENT PROFILE ADULT - NSTRANSFERBELONGINGSDISPO_GEN_A_NUR
Blayne Perez MD  P Mercy Health Springfield Regional BAO         Cell Phone/with patient

## 2022-08-15 NOTE — ED PROVIDER NOTE - NS ED ATTENDING STATEMENT MOD
I have seen and examined this patient and fully participated in the care of this patient as the teaching attending.  The service was shared with the ALETHA.  I reviewed and verified the documentation and independently performed the documented:

## 2022-08-15 NOTE — PATIENT PROFILE ADULT - FLU SEASON?
[facility]  Internal Medicine Residency / [address]      Attending Physician Statement  I have discussed the case, including pertinent history and exam findings with the resident and the team.  I have seen and examined the patient and the key elements of the encounter have been performed by me. I agree with the assessment, plan and orders as documented by the resident. Case Discussed During AM Rounds    Still abdominal symptoms- but stable    No N/V   Notes pain with diet   No change in vitals   Labs remain stable    Repeat U/A no signs of infection   Atbs remain on hold      Ileitis- noted on CT imaging    Diet advanced    GI consultation appreciated    Holding atbs    ? Persistence of symptoms    Await additional recommendations     Type II DM- uncontrolled    Continue insulin regimen   Improved glycemic control     Hypotension- resolved     Disposition- follows with [facility]- will need close outpatient follow-up upon DC. Remainder of medical problems as per resident note.       Elsa Nunez  Internal Medicine Residency Faculty No

## 2022-08-15 NOTE — H&P ADULT - ASSESSMENT
Ms Acevedo is a 37 yo F presenting from home with heavy menses. PMH Von willebrand disease type 2, recent D and E by GYN in July 27 2022.     Vaginal Bleeding: patient with heavy menstrual bleeding.   -rate of bleeding is decreased.   -repeat Hgb this evening.   -Humate P ordered by hematology  -to receive Venofer  -f/u transvaginal U/S  -patient instructed to f/u with primary GYN Dr. Goldman after discharge    Anemia: due to her vaginal bleeding.   -continue to monitor    DVT ppx: SCD's Ms Acevedo is a 35 yo F presenting from home with heavy menses. PMH Von willebrand disease type 2, recent D and E by GYN in July 27 2022.     Vaginal Bleeding: patient with heavy menstrual bleeding.   -rate of bleeding is decreased.   -repeat Hgb this evening.   -Humate P ordered by hematology  -to receive Venofer  -f/u transvaginal U/S  -patient instructed to f/u with primary GYN Dr. Goldman after discharge    Anemia: due to her vaginal bleeding.   -continue to monitor    DVT ppx: SCD's Ms Acevedo is a 35 yo F presenting from home with heavy menses. PMH Von willebrand disease type 2, recent D and E by GYN in July 27 2022.     Vaginal Bleeding: patient with heavy menstrual bleeding.   -rate of bleeding is decreased.   -repeat Hgb this evening.   -Humate P ordered by hematology  -to receive Venofer  -f/u transvaginal U/S  -Spoke with oncall GYN, Dr Olmstead who recommended patient to f/u with primary GYN Dr. Goldman after discharge    Anemia: due to her vaginal bleeding.   -continue to monitor    DVT ppx: SCD's Ms Acevedo is a 37 yo F presenting from home with heavy menses. PMH Von willebrand disease type 2, recent D and E by GYN in July 27 2022.     Vaginal Bleeding: patient with heavy menstrual bleeding.   -rate of bleeding is decreased.   -repeat Hgb this evening.   -Humate P ordered by hematology  -to receive Venofer  -f/u transvaginal U/S  -Spoke with oncall GYN, Dr Olmstead who recommended patient to f/u with primary GYN Dr. Goldman after discharge    Anemia: due to her vaginal bleeding.   -continue to monitor    DVT ppx: SCD's

## 2022-08-15 NOTE — CONSULT NOTE ADULT - SUBJECTIVE AND OBJECTIVE BOX
INCOMPLETE NOTE.  Documentation in Progress  PT SEEN AND EVALUATED.   FULL/ADDITIONAL RECOMMENDATIONS TO FOLLOW   ***************************************************************    Patient is a 36y old  Female who presents with a chief complaint of     HPI:        PAST MEDICAL & SURGICAL HISTORY:     HEALTH ISSUES - PROBLEM Dx:          FAMILY HISTORY:      [SOCIAL HISTORY: ]     smoking:       EtOH:       illicit drugs:       occupation:       marital status:       Other:       [ALLERGIES/INTOLERANCES:]  Allergies    No Known Allergies    Intolerances          [MEDICATIONS]  MEDICATIONS  (STANDING):    MEDICATIONS  (PRN):      [REVIEW OF SYSTEMS: ]  CONSTITUTIONAL: normal, no fever, no shakes, no chills   EYES: No eye pain, no visual disturbances, no discharge  ENMT:  no discharge  NECK: No pain, no stiffness  BREASTS: No pain, no masses, no nipple discharge  RESPIRATORY: No cough, no wheezing, no chills, no hemoptysis; No shortness of breath  CARDIOVASCULAR: No chest pain, no palpitations, no dizziness, no leg swelling  GASTROINTESTINAL: No abdominal, no epigastric pain. No nausea, no vomiting, no hematemesis; No diarrhea , no constipation. No melena, no hematochezia.  GENITOURINARY: No dysuria, no frequency, no hematuria, no incontinence  NEUROLOGICAL: No headaches, no memory loss, no loss of strength, no numbness, no tremors  SKIN: No itching, no burning, no rashes, no lesions   LYMPH NODES: No enlarged glands  ENDOCRINE: No heat or cold intolerance; No hair loss  MUSCULOSKELETAL: No joint pain or swelling; No muscle, no back, no extremity pain  PSYCHIATRIC: No depression, no anxiety, no mood swings, no difficulty sleeping  HEME/LYMPH: No easy bruising, no bleeding gums    [VITALS SIGNS 24hrs]  Vital Signs Last 24 Hrs  T(C): 36.8 (15 Aug 2022 09:10), Max: 36.8 (15 Aug 2022 09:10)  T(F): 98.3 (15 Aug 2022 09:10), Max: 98.3 (15 Aug 2022 09:10)  HR: 79 (15 Aug 2022 09:10) (79 - 79)  BP: 87/62 (15 Aug 2022 09:10) (87/62 - 87/62)  BP(mean): --  RR: 18 (15 Aug 2022 09:10) (18 - 18)  SpO2: 99% (15 Aug 2022 09:10) (99% - 99%)    Parameters below as of 15 Aug 2022 09:10  Patient On (Oxygen Delivery Method): room air      Daily Height in cm: 162.56 (15 Aug 2022 09:10)    Daily     I&O's Summary      [PHYSICAL EXAM]  General: adult in NAD,  WN,  WD.  HEENT: clear oropharynx, anicteric sclera, pink conjunctivae.  Neck: supple, no masses.  CV: normal S1S2, no murmur, no rubs, no gallops.  Lungs: clear to auscultation, no wheezes, no rales, no rhonchi.  Abdomen: soft, non-tender, non-distended, no hepatosplenomegaly, normal BS, no guarding.  Ext: no clubbing, no cyanosis, no edema.  Skin: no rashes,  no petechiae, no venous stasis changes.  Neuro: alert and oriented X3, no focal motor deficits.  LN: no SC HOLLY.      [LABS: ]                        10.3   9.31  )-----------( 216      ( 15 Aug 2022 09:50 )             32.0     CBC Full  -  ( 15 Aug 2022 09:50 )  WBC Count : 9.31 K/uL  RBC Count : 3.63 M/uL  Hemoglobin : 10.3 g/dL  Hematocrit : 32.0 %  Platelet Count - Automated : 216 K/uL  Mean Cell Volume : 88.2 fl  Mean Cell Hemoglobin : 28.4 pg  Mean Cell Hemoglobin Concentration : 32.2 gm/dL  Auto Neutrophil # : x  Auto Lymphocyte # : x  Auto Monocyte # : x  Auto Eosinophil # : x  Auto Basophil # : x  Auto Neutrophil % : x  Auto Lymphocyte % : x  Auto Monocyte % : x  Auto Eosinophil % : x  Auto Basophil % : x    08-15    138  |  107  |  11  ----------------------------<  120<H>  3.9   |  25  |  0.46<L>    Ca    8.6      15 Aug 2022 09:50    TPro  6.7  /  Alb  3.4  /  TBili  0.7  /  DBili  x   /  AST  13<L>  /  ALT  16  /  AlkPhos  64  08-15      LIVER FUNCTIONS - ( 15 Aug 2022 09:50 )  Alb: 3.4 g/dL / Pro: 6.7 g/dL / ALK PHOS: 64 U/L / ALT: 16 U/L / AST: 13 U/L / GGT: x                     CBC TREND (5 Days)  WBC Count: 9.31 K/uL (08-15 @ 09:50)    Hemoglobin: 10.3 g/dL (08-15 @ 09:50)    Hematocrit: 32.0 % (08-15 @ 09:50)    Platelet Count - Automated: 216 K/uL (08-15 @ 09:50)    Anemia Studies                    Myeloma Studies                          [MICROBIOLOGY /  VIROLOGY:]          [PATHOLOGY]         [RADIOLOGY & ADDITIONAL STUDIES:]        INCOMPLETE NOTE.  Documentation in Progress  PT SEEN AND EVALUATED.   FULL/ADDITIONAL RECOMMENDATIONS TO FOLLOW   ***************************************************************    Patient is a 36y old  Female who presents with a chief complaint of     HPI:  35yo Nii F with PMH ruptured appendicitis at 13yo, hx of type 2A or 2B VWD dx at 19yo by hematologist at [facility]. Prior s/p DDAVP challenge test with so-so response. Prior on stimate not much response, and TXA as needed for bleeding. Menses since 13yo, heavy 10d bleeding with 7d heavy. Menses since controlled on OCP with menses decreased to 5d with tx. Recently stopped in June as trying to get pregnant via IVF. Last IVF cycle Jul/Jul 2022. Had successful pregnancy but had non-viable embryo at 9wks.     Hx of BL Salpingectomy in past, with Humate-P prior to surgery. Heavy menses to extent needed PRBC transfusion, last txfusion 01/2022.   Last saw hematologist Spring 2022. Last IV iron Jan 2022.     Pt here for heavy menses with LH,       PAST MEDICAL & SURGICAL HISTORY:  VWD  Appendicitis  ruptured appendix  peritonitis  BL salpingectomy       HEALTH ISSUES - PROBLEM Dx:          FAMILY HISTORY:  mother alive  father alive, CAD, s/p CABG  no children  two sisters, alive and well      [SOCIAL HISTORY: ]     smoking:  denies     EtOH:  social      illicit drugs:  denies     occupation:  NP, psychiatry     marital status:  , no children     Other:       [ALLERGIES/INTOLERANCES:]  Allergies    No Known Allergies    Intolerances          [MEDICATIONS]  MEDICATIONS  (STANDING):    MEDICATIONS  (PRN):      [REVIEW OF SYSTEMS: ]  CONSTITUTIONAL: normal, no fever, no shakes, no chills   EYES: No eye pain, no visual disturbances, no discharge  ENMT:  no discharge  NECK: No pain, no stiffness  BREASTS: No pain, no masses, no nipple discharge  RESPIRATORY: No cough, no wheezing, no chills, no hemoptysis; No shortness of breath  CARDIOVASCULAR: No chest pain, no palpitations, no dizziness, no leg swelling  GASTROINTESTINAL: No abdominal, no epigastric pain. No nausea, no vomiting, no hematemesis; No diarrhea , no constipation. No melena, no hematochezia.  GENITOURINARY: No dysuria, no frequency, no hematuria, no incontinence  NEUROLOGICAL: No headaches, no memory loss, no loss of strength, no numbness, no tremors  SKIN: No itching, no burning, no rashes, no lesions   LYMPH NODES: No enlarged glands  ENDOCRINE: No heat or cold intolerance; No hair loss  MUSCULOSKELETAL: No joint pain or swelling; No muscle, no back, no extremity pain  PSYCHIATRIC: No depression, no anxiety, no mood swings, no difficulty sleeping  HEME/LYMPH: No easy bruising, no bleeding gums    [VITALS SIGNS 24hrs]  Vital Signs Last 24 Hrs  T(C): 36.8 (15 Aug 2022 09:10), Max: 36.8 (15 Aug 2022 09:10)  T(F): 98.3 (15 Aug 2022 09:10), Max: 98.3 (15 Aug 2022 09:10)  HR: 79 (15 Aug 2022 09:10) (79 - 79)  BP: 87/62 (15 Aug 2022 09:10) (87/62 - 87/62)  BP(mean): --  RR: 18 (15 Aug 2022 09:10) (18 - 18)  SpO2: 99% (15 Aug 2022 09:10) (99% - 99%)    Parameters below as of 15 Aug 2022 09:10  Patient On (Oxygen Delivery Method): room air      Daily Height in cm: 162.56 (15 Aug 2022 09:10)    Daily     I&O's Summary      [PHYSICAL EXAM]  General: adult in NAD,  WN,  WD.  HEENT: clear oropharynx, anicteric sclera, pink conjunctivae.  Neck: supple, no masses.  CV: normal S1S2, no murmur, no rubs, no gallops.  Lungs: clear to auscultation, no wheezes, no rales, no rhonchi.  Abdomen: soft, non-tender, non-distended, no hepatosplenomegaly, normal BS, no guarding.  Ext: no clubbing, no cyanosis, no edema.  Skin: no rashes,  no petechiae, no venous stasis changes.  Neuro: alert and oriented X3, no focal motor deficits.  LN: no SC HOLLY.      [LABS: ]                        10.3   9.31  )-----------( 216      ( 15 Aug 2022 09:50 )             32.0     CBC Full  -  ( 15 Aug 2022 09:50 )  WBC Count : 9.31 K/uL  RBC Count : 3.63 M/uL  Hemoglobin : 10.3 g/dL  Hematocrit : 32.0 %  Platelet Count - Automated : 216 K/uL  Mean Cell Volume : 88.2 fl  Mean Cell Hemoglobin : 28.4 pg  Mean Cell Hemoglobin Concentration : 32.2 gm/dL  Auto Neutrophil # : x  Auto Lymphocyte # : x  Auto Monocyte # : x  Auto Eosinophil # : x  Auto Basophil # : x  Auto Neutrophil % : x  Auto Lymphocyte % : x  Auto Monocyte % : x  Auto Eosinophil % : x  Auto Basophil % : x    08-15    138  |  107  |  11  ----------------------------<  120<H>  3.9   |  25  |  0.46<L>    Ca    8.6      15 Aug 2022 09:50    TPro  6.7  /  Alb  3.4  /  TBili  0.7  /  DBili  x   /  AST  13<L>  /  ALT  16  /  AlkPhos  64  08-15      LIVER FUNCTIONS - ( 15 Aug 2022 09:50 )  Alb: 3.4 g/dL / Pro: 6.7 g/dL / ALK PHOS: 64 U/L / ALT: 16 U/L / AST: 13 U/L / GGT: x                     CBC TREND (5 Days)  WBC Count: 9.31 K/uL (08-15 @ 09:50)    Hemoglobin: 10.3 g/dL (08-15 @ 09:50)    Hematocrit: 32.0 % (08-15 @ 09:50)    Platelet Count - Automated: 216 K/uL (08-15 @ 09:50)    Anemia Studies                    Myeloma Studies                          [MICROBIOLOGY /  VIROLOGY:]          [PATHOLOGY]         [RADIOLOGY & ADDITIONAL STUDIES:]        INCOMPLETE NOTE.  Documentation in Progress  PT SEEN AND EVALUATED.   FULL/ADDITIONAL RECOMMENDATIONS TO FOLLOW   ***************************************************************    Patient is a 36y old  Female who presents with a chief complaint of     HPI:  35yo Nii F with PMH ruptured appendicitis at 11yo, hx of type 2A or 2B VWD dx at 17yo by hematologist at [facility]. Prior s/p DDAVP challenge test with so-so response. Prior on stimate not much response, and TXA as needed for bleeding. Menses since 15yo, heavy 10d bleeding with 7d heavy. Menses since controlled on OCP with menses decreased to 5d with tx. Recently stopped in June as trying to get pregnant via IVF. Last IVF cycle Jul/Jul 2022. Had successful pregnancy but had non-viable embryo at 9wks.     Hx of BL Salpingectomy in past, with Humate-P prior to surgery. Heavy menses to extent needed PRBC transfusion, last txfusion 01/2022.   Last saw hematologist Spring 2022. Last IV iron Jan 2022.     Pt here for heavy menses with LH,       PAST MEDICAL & SURGICAL HISTORY:  VWD  Appendicitis  ruptured appendix  peritonitis  BL salpingectomy       HEALTH ISSUES - PROBLEM Dx:          FAMILY HISTORY:  mother alive  father alive, CAD, s/p CABG  no children  two sisters, alive and well      [SOCIAL HISTORY: ]     smoking:  denies     EtOH:  social      illicit drugs:  denies     occupation:  NP, psychiatry     marital status:  , no children     Other:       [ALLERGIES/INTOLERANCES:]  Allergies    No Known Allergies    Intolerances          [MEDICATIONS]  MEDICATIONS  (STANDING):    MEDICATIONS  (PRN):      [REVIEW OF SYSTEMS: ]  CONSTITUTIONAL: normal, no fever, no shakes, no chills   EYES: No eye pain, no visual disturbances, no discharge  ENMT:  no discharge  NECK: No pain, no stiffness  BREASTS: No pain, no masses, no nipple discharge  RESPIRATORY: No cough, no wheezing, no chills, no hemoptysis; No shortness of breath  CARDIOVASCULAR: No chest pain, no palpitations, no dizziness, no leg swelling  GASTROINTESTINAL: No abdominal, no epigastric pain. No nausea, no vomiting, no hematemesis; No diarrhea , no constipation. No melena, no hematochezia.  GENITOURINARY: No dysuria, no frequency, no hematuria, no incontinence  NEUROLOGICAL: No headaches, no memory loss, no loss of strength, no numbness, no tremors  SKIN: No itching, no burning, no rashes, no lesions   LYMPH NODES: No enlarged glands  ENDOCRINE: No heat or cold intolerance; No hair loss  MUSCULOSKELETAL: No joint pain or swelling; No muscle, no back, no extremity pain  PSYCHIATRIC: No depression, no anxiety, no mood swings, no difficulty sleeping  HEME/LYMPH: No easy bruising, no bleeding gums    [VITALS SIGNS 24hrs]  Vital Signs Last 24 Hrs  T(C): 36.8 (15 Aug 2022 09:10), Max: 36.8 (15 Aug 2022 09:10)  T(F): 98.3 (15 Aug 2022 09:10), Max: 98.3 (15 Aug 2022 09:10)  HR: 79 (15 Aug 2022 09:10) (79 - 79)  BP: 87/62 (15 Aug 2022 09:10) (87/62 - 87/62)  BP(mean): --  RR: 18 (15 Aug 2022 09:10) (18 - 18)  SpO2: 99% (15 Aug 2022 09:10) (99% - 99%)    Parameters below as of 15 Aug 2022 09:10  Patient On (Oxygen Delivery Method): room air      Daily Height in cm: 162.56 (15 Aug 2022 09:10)    Daily     I&O's Summary      [PHYSICAL EXAM]  General: adult in NAD,  WN,  WD.  HEENT: clear oropharynx, anicteric sclera, pink conjunctivae.  Neck: supple, no masses.  CV: normal S1S2, no murmur, no rubs, no gallops.  Lungs: clear to auscultation, no wheezes, no rales, no rhonchi.  Abdomen: soft, non-tender, non-distended, no hepatosplenomegaly, normal BS, no guarding.  Ext: no clubbing, no cyanosis, no edema.  Skin: no rashes,  no petechiae, no venous stasis changes.  Neuro: alert and oriented X3, no focal motor deficits.  LN: no SC HOLLY.      [LABS: ]                        10.3   9.31  )-----------( 216      ( 15 Aug 2022 09:50 )             32.0     CBC Full  -  ( 15 Aug 2022 09:50 )  WBC Count : 9.31 K/uL  RBC Count : 3.63 M/uL  Hemoglobin : 10.3 g/dL  Hematocrit : 32.0 %  Platelet Count - Automated : 216 K/uL  Mean Cell Volume : 88.2 fl  Mean Cell Hemoglobin : 28.4 pg  Mean Cell Hemoglobin Concentration : 32.2 gm/dL  Auto Neutrophil # : x  Auto Lymphocyte # : x  Auto Monocyte # : x  Auto Eosinophil # : x  Auto Basophil # : x  Auto Neutrophil % : x  Auto Lymphocyte % : x  Auto Monocyte % : x  Auto Eosinophil % : x  Auto Basophil % : x    08-15    138  |  107  |  11  ----------------------------<  120<H>  3.9   |  25  |  0.46<L>    Ca    8.6      15 Aug 2022 09:50    TPro  6.7  /  Alb  3.4  /  TBili  0.7  /  DBili  x   /  AST  13<L>  /  ALT  16  /  AlkPhos  64  08-15      LIVER FUNCTIONS - ( 15 Aug 2022 09:50 )  Alb: 3.4 g/dL / Pro: 6.7 g/dL / ALK PHOS: 64 U/L / ALT: 16 U/L / AST: 13 U/L / GGT: x                     CBC TREND (5 Days)  WBC Count: 9.31 K/uL (08-15 @ 09:50)    Hemoglobin: 10.3 g/dL (08-15 @ 09:50)    Hematocrit: 32.0 % (08-15 @ 09:50)    Platelet Count - Automated: 216 K/uL (08-15 @ 09:50)    Anemia Studies                    Myeloma Studies                          [MICROBIOLOGY /  VIROLOGY:]          [PATHOLOGY]         [RADIOLOGY & ADDITIONAL STUDIES:]        INCOMPLETE NOTE.  Documentation in Progress  PT SEEN AND EVALUATED.   FULL/ADDITIONAL RECOMMENDATIONS TO FOLLOW   ***************************************************************    Patient is a 36y old  Female who presents with a chief complaint of     HPI:  35yo Nii F with PMH ruptured appendicitis at 11yo, hx of type 2A or 2B VWD dx at 19yo by hematologist at [facility]. Prior s/p DDAVP challenge test with so-so response. Prior on stimate not much response, and TXA as needed for bleeding. Menses since 13yo, heavy 10d bleeding with 7d heavy. Menses since controlled on OCP with menses decreased to 5d with tx. Recently stopped in June as trying to get pregnant via IVF. Last IVF cycle Jul/Jul 2022. Had successful pregnancy but had non-viable embryo at 9wks.     Hx of BL Salpingectomy in past, with Humate-P prior to surgery. Heavy menses to extent needed PRBC transfusion, last txfusion 01/2022.   Last saw hematologist Spring 2022. Last IV iron Jan 2022.     Pt here for heavy menses with LH,       PAST MEDICAL & SURGICAL HISTORY:  VWD  Appendicitis  ruptured appendix  peritonitis  BL salpingectomy       HEALTH ISSUES - PROBLEM Dx:          FAMILY HISTORY:  mother alive  father alive, CAD, s/p CABG  no children  two sisters, alive and well      [SOCIAL HISTORY: ]     smoking:  denies     EtOH:  social      illicit drugs:  denies     occupation:  NP, psychiatry     marital status:  , no children     Other:       [ALLERGIES/INTOLERANCES:]  Allergies    No Known Allergies    Intolerances          [MEDICATIONS]  MEDICATIONS  (STANDING):    MEDICATIONS  (PRN):      [REVIEW OF SYSTEMS: ]  CONSTITUTIONAL: normal, no fever, no shakes, no chills   EYES: No eye pain, no visual disturbances, no discharge  ENMT:  no discharge  NECK: No pain, no stiffness  BREASTS: No pain, no masses, no nipple discharge  RESPIRATORY: No cough, no wheezing, no chills, no hemoptysis; No shortness of breath  CARDIOVASCULAR: No chest pain, no palpitations, no dizziness, no leg swelling  GASTROINTESTINAL: No abdominal, no epigastric pain. No nausea, no vomiting, no hematemesis; No diarrhea , no constipation. No melena, no hematochezia.  GENITOURINARY: No dysuria, no frequency, no hematuria, no incontinence  NEUROLOGICAL: No headaches, no memory loss, no loss of strength, no numbness, no tremors  SKIN: No itching, no burning, no rashes, no lesions   LYMPH NODES: No enlarged glands  ENDOCRINE: No heat or cold intolerance; No hair loss  MUSCULOSKELETAL: No joint pain or swelling; No muscle, no back, no extremity pain  PSYCHIATRIC: No depression, no anxiety, no mood swings, no difficulty sleeping  HEME/LYMPH: No easy bruising, no bleeding gums    [VITALS SIGNS 24hrs]  Vital Signs Last 24 Hrs  T(C): 36.8 (15 Aug 2022 09:10), Max: 36.8 (15 Aug 2022 09:10)  T(F): 98.3 (15 Aug 2022 09:10), Max: 98.3 (15 Aug 2022 09:10)  HR: 79 (15 Aug 2022 09:10) (79 - 79)  BP: 87/62 (15 Aug 2022 09:10) (87/62 - 87/62)  BP(mean): --  RR: 18 (15 Aug 2022 09:10) (18 - 18)  SpO2: 99% (15 Aug 2022 09:10) (99% - 99%)    Parameters below as of 15 Aug 2022 09:10  Patient On (Oxygen Delivery Method): room air      Daily Height in cm: 162.56 (15 Aug 2022 09:10)    Daily     I&O's Summary      [PHYSICAL EXAM]  General: adult in NAD,  WN,  WD.  HEENT: clear oropharynx, anicteric sclera, pink conjunctivae.  Neck: supple, no masses.  CV: normal S1S2, no murmur, no rubs, no gallops.  Lungs: clear to auscultation, no wheezes, no rales, no rhonchi.  Abdomen: soft, non-tender, non-distended, no hepatosplenomegaly, normal BS, no guarding.  Ext: no clubbing, no cyanosis, no edema.  Skin: no rashes,  no petechiae, no venous stasis changes.  Neuro: alert and oriented X3, no focal motor deficits.  LN: no SC HOLLY.      [LABS: ]                        10.3   9.31  )-----------( 216      ( 15 Aug 2022 09:50 )             32.0     CBC Full  -  ( 15 Aug 2022 09:50 )  WBC Count : 9.31 K/uL  RBC Count : 3.63 M/uL  Hemoglobin : 10.3 g/dL  Hematocrit : 32.0 %  Platelet Count - Automated : 216 K/uL  Mean Cell Volume : 88.2 fl  Mean Cell Hemoglobin : 28.4 pg  Mean Cell Hemoglobin Concentration : 32.2 gm/dL  Auto Neutrophil # : x  Auto Lymphocyte # : x  Auto Monocyte # : x  Auto Eosinophil # : x  Auto Basophil # : x  Auto Neutrophil % : x  Auto Lymphocyte % : x  Auto Monocyte % : x  Auto Eosinophil % : x  Auto Basophil % : x    08-15    138  |  107  |  11  ----------------------------<  120<H>  3.9   |  25  |  0.46<L>    Ca    8.6      15 Aug 2022 09:50    TPro  6.7  /  Alb  3.4  /  TBili  0.7  /  DBili  x   /  AST  13<L>  /  ALT  16  /  AlkPhos  64  08-15      LIVER FUNCTIONS - ( 15 Aug 2022 09:50 )  Alb: 3.4 g/dL / Pro: 6.7 g/dL / ALK PHOS: 64 U/L / ALT: 16 U/L / AST: 13 U/L / GGT: x                     CBC TREND (5 Days)  WBC Count: 9.31 K/uL (08-15 @ 09:50)    Hemoglobin: 10.3 g/dL (08-15 @ 09:50)    Hematocrit: 32.0 % (08-15 @ 09:50)    Platelet Count - Automated: 216 K/uL (08-15 @ 09:50)    Anemia Studies                    Myeloma Studies                          [MICROBIOLOGY /  VIROLOGY:]          [PATHOLOGY]         [RADIOLOGY & ADDITIONAL STUDIES:]        Patient is a 36y old  Female who presents with a chief complaint of     HPI:  35yo Nii F with PMH ruptured appendicitis at 13yo, hx of type 2A or 2B VWD dx at 19yo by hematologist at [facility]. Prior s/p DDAVP challenge test with so-so response. Prior on stimate not much response, and TXA as needed for bleeding. Menses since 15yo, heavy 10d bleeding with 7d heavy. Menses since controlled on OCP with menses decreased to 5d with tx. Recently stopped in June as trying to get pregnant via IVF. Last IVF cycle Jul/Jul 2022. Had successful pregnancy but had non-viable embryo at 9wks.     Hx of BL Salpingectomy in past, with Humate-P prior to surgery. Heavy menses to extent needed PRBC transfusion, last txfusion 01/2022.   Last saw hematologist Spring 2022. Last IV iron Jan 2022.     Pt here for heavy menses with LH,       PAST MEDICAL & SURGICAL HISTORY:  VWD  Appendicitis  ruptured appendix  peritonitis  BL salpingectomy       HEALTH ISSUES - PROBLEM Dx:          FAMILY HISTORY:  mother alive  father alive, CAD, s/p CABG  no children  two sisters, alive and well      [SOCIAL HISTORY: ]     smoking:  denies     EtOH:  social      illicit drugs:  denies     occupation:  NP, psychiatry     marital status:  , no children     Other:       [ALLERGIES/INTOLERANCES:]  Allergies     No Known Allergies  Intolerances      [MEDICATIONS]  MEDICATIONS  (STANDING):    MEDICATIONS  (PRN):      [REVIEW OF SYSTEMS: ]  CONSTITUTIONAL: normal, no fever, no shakes, no chills   EYES: No eye pain, no visual disturbances, no discharge  ENMT:  no discharge  NECK: No pain, no stiffness  BREASTS: No pain, no masses, no nipple discharge  RESPIRATORY: No cough, no wheezing, no chills, no hemoptysis; No shortness of breath  CARDIOVASCULAR: No chest pain, no palpitations, no dizziness, no leg swelling  GASTROINTESTINAL: No abdominal, no epigastric pain. No nausea, no vomiting, no hematemesis; No diarrhea , no constipation. No melena, no hematochezia.  GENITOURINARY: No dysuria, no frequency, no hematuria, no incontinence, +vaginal bleeding  NEUROLOGICAL: No headaches, no memory loss, no loss of strength, no numbness, no tremors  SKIN: No itching, no burning, no rashes, no lesions   LYMPH NODES: No enlarged glands  ENDOCRINE: No heat or cold intolerance; No hair loss  MUSCULOSKELETAL: No joint pain or swelling; No muscle, no back, no extremity pain  PSYCHIATRIC: No depression, no anxiety, no mood swings, no difficulty sleeping  HEME/LYMPH: No easy bruising, no bleeding gums    [VITALS SIGNS 24hrs]  Vital Signs Last 24 Hrs  T(C): 36.8 (15 Aug 2022 09:10), Max: 36.8 (15 Aug 2022 09:10)  T(F): 98.3 (15 Aug 2022 09:10), Max: 98.3 (15 Aug 2022 09:10)  HR: 79 (15 Aug 2022 09:10) (79 - 79)  BP: 87/62 (15 Aug 2022 09:10) (87/62 - 87/62)  BP(mean): --  RR: 18 (15 Aug 2022 09:10) (18 - 18)  SpO2: 99% (15 Aug 2022 09:10) (99% - 99%)    Parameters below as of 15 Aug 2022 09:10  Patient On (Oxygen Delivery Method): room air      Daily Height in cm: 162.56 (15 Aug 2022 09:10)    Daily     I&O's Summary      [PHYSICAL EXAM]  General: adult in NAD,  WN,  WD.  HEENT: clear oropharynx, anicteric sclera, pink conjunctivae.  Neck: supple, no masses.  CV: normal S1S2, no murmur, no rubs, no gallops.  Lungs: clear to auscultation, no wheezes, no rales, no rhonchi.  Abdomen: soft, non-tender, non-distended, no hepatosplenomegaly, normal BS, no guarding.  Ext: no clubbing, no cyanosis, no edema.  Skin: no rashes,  no petechiae, no venous stasis changes.  Neuro: alert and oriented X3, no focal motor deficits.  LN: no SC HOLLY.      [LABS: ]                        10.3   9.31  )-----------( 216      ( 15 Aug 2022 09:50 )             32.0     CBC Full  -  ( 15 Aug 2022 09:50 )  WBC Count : 9.31 K/uL  RBC Count : 3.63 M/uL  Hemoglobin : 10.3 g/dL  Hematocrit : 32.0 %  Platelet Count - Automated : 216 K/uL  Mean Cell Volume : 88.2 fl  Mean Cell Hemoglobin : 28.4 pg  Mean Cell Hemoglobin Concentration : 32.2 gm/dL  Auto Neutrophil # : x  Auto Lymphocyte # : x  Auto Monocyte # : x  Auto Eosinophil # : x  Auto Basophil # : x  Auto Neutrophil % : x  Auto Lymphocyte % : x  Auto Monocyte % : x  Auto Eosinophil % : x  Auto Basophil % : x    08-15    138  |  107  |  11  ----------------------------<  120<H>  3.9   |  25  |  0.46<L>    Ca    8.6      15 Aug 2022 09:50    TPro  6.7  /  Alb  3.4  /  TBili  0.7  /  DBili  x   /  AST  13<L>  /  ALT  16  /  AlkPhos  64  08-15      LIVER FUNCTIONS - ( 15 Aug 2022 09:50 )  Alb: 3.4 g/dL / Pro: 6.7 g/dL / ALK PHOS: 64 U/L / ALT: 16 U/L / AST: 13 U/L / GGT: x               CBC TREND (5 Days)  WBC Count: 9.31 K/uL (08-15 @ 09:50)  Hemoglobin: 10.3 g/dL (08-15 @ 09:50)  Hematocrit: 32.0 % (08-15 @ 09:50)  Platelet Count - Automated: 216 K/uL (08-15 @ 09:50)       [MICROBIOLOGY /  VIROLOGY:]          [PATHOLOGY]         [RADIOLOGY & ADDITIONAL STUDIES:]        Patient is a 36y old  Female who presents with a chief complaint of     HPI:  37yo Nii F with PMH ruptured appendicitis at 13yo, hx of type 2A or 2B VWD dx at 17yo by hematologist at [facility]. Prior s/p DDAVP challenge test with so-so response. Prior on stimate not much response, and TXA as needed for bleeding. Menses since 15yo, heavy 10d bleeding with 7d heavy. Menses since controlled on OCP with menses decreased to 5d with tx. Recently stopped in June as trying to get pregnant via IVF. Last IVF cycle Jul/Jul 2022. Had successful pregnancy but had non-viable embryo at 9wks.     Hx of BL Salpingectomy in past, with Humate-P prior to surgery. Heavy menses to extent needed PRBC transfusion, last txfusion 01/2022.   Last saw hematologist Spring 2022. Last IV iron Jan 2022.     Pt here for heavy menses with LH,       PAST MEDICAL & SURGICAL HISTORY:  VWD  Appendicitis  ruptured appendix  peritonitis  BL salpingectomy       HEALTH ISSUES - PROBLEM Dx:          FAMILY HISTORY:  mother alive  father alive, CAD, s/p CABG  no children  two sisters, alive and well      [SOCIAL HISTORY: ]     smoking:  denies     EtOH:  social      illicit drugs:  denies     occupation:  NP, psychiatry     marital status:  , no children     Other:       [ALLERGIES/INTOLERANCES:]  Allergies     No Known Allergies  Intolerances      [MEDICATIONS]  MEDICATIONS  (STANDING):    MEDICATIONS  (PRN):      [REVIEW OF SYSTEMS: ]  CONSTITUTIONAL: normal, no fever, no shakes, no chills   EYES: No eye pain, no visual disturbances, no discharge  ENMT:  no discharge  NECK: No pain, no stiffness  BREASTS: No pain, no masses, no nipple discharge  RESPIRATORY: No cough, no wheezing, no chills, no hemoptysis; No shortness of breath  CARDIOVASCULAR: No chest pain, no palpitations, no dizziness, no leg swelling  GASTROINTESTINAL: No abdominal, no epigastric pain. No nausea, no vomiting, no hematemesis; No diarrhea , no constipation. No melena, no hematochezia.  GENITOURINARY: No dysuria, no frequency, no hematuria, no incontinence, +vaginal bleeding  NEUROLOGICAL: No headaches, no memory loss, no loss of strength, no numbness, no tremors  SKIN: No itching, no burning, no rashes, no lesions   LYMPH NODES: No enlarged glands  ENDOCRINE: No heat or cold intolerance; No hair loss  MUSCULOSKELETAL: No joint pain or swelling; No muscle, no back, no extremity pain  PSYCHIATRIC: No depression, no anxiety, no mood swings, no difficulty sleeping  HEME/LYMPH: No easy bruising, no bleeding gums    [VITALS SIGNS 24hrs]  Vital Signs Last 24 Hrs  T(C): 36.8 (15 Aug 2022 09:10), Max: 36.8 (15 Aug 2022 09:10)  T(F): 98.3 (15 Aug 2022 09:10), Max: 98.3 (15 Aug 2022 09:10)  HR: 79 (15 Aug 2022 09:10) (79 - 79)  BP: 87/62 (15 Aug 2022 09:10) (87/62 - 87/62)  BP(mean): --  RR: 18 (15 Aug 2022 09:10) (18 - 18)  SpO2: 99% (15 Aug 2022 09:10) (99% - 99%)    Parameters below as of 15 Aug 2022 09:10  Patient On (Oxygen Delivery Method): room air      Daily Height in cm: 162.56 (15 Aug 2022 09:10)    Daily     I&O's Summary      [PHYSICAL EXAM]  General: adult in NAD,  WN,  WD.  HEENT: clear oropharynx, anicteric sclera, pink conjunctivae.  Neck: supple, no masses.  CV: normal S1S2, no murmur, no rubs, no gallops.  Lungs: clear to auscultation, no wheezes, no rales, no rhonchi.  Abdomen: soft, non-tender, non-distended, no hepatosplenomegaly, normal BS, no guarding.  Ext: no clubbing, no cyanosis, no edema.  Skin: no rashes,  no petechiae, no venous stasis changes.  Neuro: alert and oriented X3, no focal motor deficits.  LN: no SC HOLLY.      [LABS: ]                        10.3   9.31  )-----------( 216      ( 15 Aug 2022 09:50 )             32.0     CBC Full  -  ( 15 Aug 2022 09:50 )  WBC Count : 9.31 K/uL  RBC Count : 3.63 M/uL  Hemoglobin : 10.3 g/dL  Hematocrit : 32.0 %  Platelet Count - Automated : 216 K/uL  Mean Cell Volume : 88.2 fl  Mean Cell Hemoglobin : 28.4 pg  Mean Cell Hemoglobin Concentration : 32.2 gm/dL  Auto Neutrophil # : x  Auto Lymphocyte # : x  Auto Monocyte # : x  Auto Eosinophil # : x  Auto Basophil # : x  Auto Neutrophil % : x  Auto Lymphocyte % : x  Auto Monocyte % : x  Auto Eosinophil % : x  Auto Basophil % : x    08-15    138  |  107  |  11  ----------------------------<  120<H>  3.9   |  25  |  0.46<L>    Ca    8.6      15 Aug 2022 09:50    TPro  6.7  /  Alb  3.4  /  TBili  0.7  /  DBili  x   /  AST  13<L>  /  ALT  16  /  AlkPhos  64  08-15      LIVER FUNCTIONS - ( 15 Aug 2022 09:50 )  Alb: 3.4 g/dL / Pro: 6.7 g/dL / ALK PHOS: 64 U/L / ALT: 16 U/L / AST: 13 U/L / GGT: x               CBC TREND (5 Days)  WBC Count: 9.31 K/uL (08-15 @ 09:50)  Hemoglobin: 10.3 g/dL (08-15 @ 09:50)  Hematocrit: 32.0 % (08-15 @ 09:50)  Platelet Count - Automated: 216 K/uL (08-15 @ 09:50)       [MICROBIOLOGY /  VIROLOGY:]          [PATHOLOGY]         [RADIOLOGY & ADDITIONAL STUDIES:]        Patient is a 36y old  Female who presents with a chief complaint of     HPI:  35yo Nii F with PMH ruptured appendicitis at 13yo, hx of type 2A or 2B VWD dx at 19yo by hematologist at [facility]. Prior s/p DDAVP challenge test with so-so response. Prior on stimate not much response, and TXA as needed for bleeding. Menses since 15yo, heavy 10d bleeding with 7d heavy. Menses since controlled on OCP with menses decreased to 5d with tx. Recently stopped in June as trying to get pregnant via IVF. Last IVF cycle Jul/Jul 2022. Had successful pregnancy but had non-viable embryo at 9wks.     Hx of BL Salpingectomy in past, with Humate-P prior to surgery. Heavy menses to extent needed PRBC transfusion, last txfusion 01/2022.   Last saw hematologist Spring 2022. Last IV iron Jan 2022.     Pt here for heavy menses with LH,       PAST MEDICAL & SURGICAL HISTORY:  VWD  Appendicitis  ruptured appendix  peritonitis  BL salpingectomy       HEALTH ISSUES - PROBLEM Dx:          FAMILY HISTORY:  mother alive  father alive, CAD, s/p CABG  no children  two sisters, alive and well      [SOCIAL HISTORY: ]     smoking:  denies     EtOH:  social      illicit drugs:  denies     occupation:  NP, psychiatry     marital status:  , no children     Other:       [ALLERGIES/INTOLERANCES:]  Allergies     No Known Allergies  Intolerances      [MEDICATIONS]  MEDICATIONS  (STANDING):    MEDICATIONS  (PRN):      [REVIEW OF SYSTEMS: ]  CONSTITUTIONAL: normal, no fever, no shakes, no chills   EYES: No eye pain, no visual disturbances, no discharge  ENMT:  no discharge  NECK: No pain, no stiffness  BREASTS: No pain, no masses, no nipple discharge  RESPIRATORY: No cough, no wheezing, no chills, no hemoptysis; No shortness of breath  CARDIOVASCULAR: No chest pain, no palpitations, no dizziness, no leg swelling  GASTROINTESTINAL: No abdominal, no epigastric pain. No nausea, no vomiting, no hematemesis; No diarrhea , no constipation. No melena, no hematochezia.  GENITOURINARY: No dysuria, no frequency, no hematuria, no incontinence, +vaginal bleeding  NEUROLOGICAL: No headaches, no memory loss, no loss of strength, no numbness, no tremors  SKIN: No itching, no burning, no rashes, no lesions   LYMPH NODES: No enlarged glands  ENDOCRINE: No heat or cold intolerance; No hair loss  MUSCULOSKELETAL: No joint pain or swelling; No muscle, no back, no extremity pain  PSYCHIATRIC: No depression, no anxiety, no mood swings, no difficulty sleeping  HEME/LYMPH: No easy bruising, no bleeding gums    [VITALS SIGNS 24hrs]  Vital Signs Last 24 Hrs  T(C): 36.8 (15 Aug 2022 09:10), Max: 36.8 (15 Aug 2022 09:10)  T(F): 98.3 (15 Aug 2022 09:10), Max: 98.3 (15 Aug 2022 09:10)  HR: 79 (15 Aug 2022 09:10) (79 - 79)  BP: 87/62 (15 Aug 2022 09:10) (87/62 - 87/62)  BP(mean): --  RR: 18 (15 Aug 2022 09:10) (18 - 18)  SpO2: 99% (15 Aug 2022 09:10) (99% - 99%)    Parameters below as of 15 Aug 2022 09:10  Patient On (Oxygen Delivery Method): room air      Daily Height in cm: 162.56 (15 Aug 2022 09:10)    Daily     I&O's Summary      [PHYSICAL EXAM]  General: adult in NAD,  WN,  WD.  HEENT: clear oropharynx, anicteric sclera, pink conjunctivae.  Neck: supple, no masses.  CV: normal S1S2, no murmur, no rubs, no gallops.  Lungs: clear to auscultation, no wheezes, no rales, no rhonchi.  Abdomen: soft, non-tender, non-distended, no hepatosplenomegaly, normal BS, no guarding.  Ext: no clubbing, no cyanosis, no edema.  Skin: no rashes,  no petechiae, no venous stasis changes.  Neuro: alert and oriented X3, no focal motor deficits.  LN: no SC HOLLY.      [LABS: ]                        10.3   9.31  )-----------( 216      ( 15 Aug 2022 09:50 )             32.0     CBC Full  -  ( 15 Aug 2022 09:50 )  WBC Count : 9.31 K/uL  RBC Count : 3.63 M/uL  Hemoglobin : 10.3 g/dL  Hematocrit : 32.0 %  Platelet Count - Automated : 216 K/uL  Mean Cell Volume : 88.2 fl  Mean Cell Hemoglobin : 28.4 pg  Mean Cell Hemoglobin Concentration : 32.2 gm/dL  Auto Neutrophil # : x  Auto Lymphocyte # : x  Auto Monocyte # : x  Auto Eosinophil # : x  Auto Basophil # : x  Auto Neutrophil % : x  Auto Lymphocyte % : x  Auto Monocyte % : x  Auto Eosinophil % : x  Auto Basophil % : x    08-15    138  |  107  |  11  ----------------------------<  120<H>  3.9   |  25  |  0.46<L>    Ca    8.6      15 Aug 2022 09:50    TPro  6.7  /  Alb  3.4  /  TBili  0.7  /  DBili  x   /  AST  13<L>  /  ALT  16  /  AlkPhos  64  08-15      LIVER FUNCTIONS - ( 15 Aug 2022 09:50 )  Alb: 3.4 g/dL / Pro: 6.7 g/dL / ALK PHOS: 64 U/L / ALT: 16 U/L / AST: 13 U/L / GGT: x               CBC TREND (5 Days)  WBC Count: 9.31 K/uL (08-15 @ 09:50)  Hemoglobin: 10.3 g/dL (08-15 @ 09:50)  Hematocrit: 32.0 % (08-15 @ 09:50)  Platelet Count - Automated: 216 K/uL (08-15 @ 09:50)       [MICROBIOLOGY /  VIROLOGY:]          [PATHOLOGY]         [RADIOLOGY & ADDITIONAL STUDIES:]

## 2022-08-15 NOTE — ED PROVIDER NOTE - CLINICAL SUMMARY MEDICAL DECISION MAKING FREE TEXT BOX
Gurwinder: pt with heavy vaginal bleeding since this morning Gurwinder: pt with heavy vaginal bleeding since this morning, h/o von Willebrand's disease, seen by Dr. Vegas hematology who states pt needs admission for hematologic agents to be infused IV for bleeding.

## 2022-08-15 NOTE — H&P ADULT - NSHPREVIEWOFSYSTEMS_GEN_ALL_CORE
CONSTITUTIONAL: denies fever, chills, admits fatigue and weakness [headache and dizziness resolved]  HEENT: denies blurred vision, sore throat  SKIN: denies new lesions, rash  CARDIOVASCULAR: denies chest pain, chest pressure, palpitations  RESPIRATORY: denies shortness of breath, sputum production  GASTROINTESTINAL: denies nausea, vomiting, diarrhea, abdominal pain, melena, hematochezia  GENITOURINARY: denies dysuria, discharge. Admits vaginal bleeding  NEUROLOGICAL: denies numbness, headache, focal weakness  MUSCULOSKELETAL: denies new joint pain, muscle aches  HEMATOLOGIC: see HPI

## 2022-08-15 NOTE — PATIENT PROFILE ADULT - FALL HARM RISK - UNIVERSAL INTERVENTIONS
Bed in lowest position, wheels locked, appropriate side rails in place/Call bell, personal items and telephone in reach/Instruct patient to call for assistance before getting out of bed or chair/Non-slip footwear when patient is out of bed/[place] to call system/Physically safe environment - no spills, clutter or unnecessary equipment/Purposeful Proactive Rounding/Room/bathroom lighting operational, light cord in reach Bed in lowest position, wheels locked, appropriate side rails in place/Call bell, personal items and telephone in reach/Instruct patient to call for assistance before getting out of bed or chair/Non-slip footwear when patient is out of bed/[place] to call system/Physically safe environment - no spills, clutter or unnecessary equipment/Purposeful Proactive Rounding/Room/bathroom lighting operational, light cord in reach Bed in lowest position, wheels locked, appropriate side rails in place/Call bell, personal items and telephone in reach/Instruct patient to call for assistance before getting out of bed or chair/Non-slip footwear when patient is out of bed/[place] to call system/Physically safe environment - no spills, clutter or unnecessary equipment/Purposeful Proactive Rounding/Room/bathroom lighting operational, light cord in reach

## 2022-08-15 NOTE — H&P ADULT - HISTORY OF PRESENT ILLNESS
Ms Acevedo is a 35 yo F presenting from home with heavy menses. PMH Von willebrand disease type 2, recent D and E by GYN in July 27 2022.     Patient seen and examined at bedside. She reports heavy menstrual bleeding which started last night. Bleeding started at 11PM, she has gone through about 15 pads since the bleeding started. It has since slowed down, shetook one dose of her oral contraceptive pill to help regulate her hormones and bleeding she states.   She had a Dilatation and evacuation approx 2.5 weeks ago and has only had brief spotting since then. Her menstrual cycle lasts about 10 days. Today is her first day of OCP for the past few months since her and her  are trying to conceive a child.  Ms Acevedo is a 37 yo F presenting from home with heavy menses. PMH Von willebrand disease type 2, recent D and E by GYN in July 27 2022.     Patient seen and examined at bedside. She reports heavy menstrual bleeding which started last night. Bleeding started at 11PM, she has gone through about 15 pads since the bleeding started. It has since slowed down, shetook one dose of her oral contraceptive pill to help regulate her hormones and bleeding she states.   She had a Dilatation and evacuation approx 2.5 weeks ago and has only had brief spotting since then. Her menstrual cycle lasts about 10 days. Today is her first day of OCP for the past few months since her and her  are trying to conceive a child.

## 2022-08-15 NOTE — ED ADULT NURSE REASSESSMENT NOTE - NSIMPLEMENTINTERV_GEN_ALL_ED
Implemented All Universal Safety Interventions:  [place] to call system. Call bell, personal items and telephone within reach. Instruct patient to call for assistance. Room bathroom lighting operational. Non-slip footwear when patient is off stretcher. Physically safe environment: no spills, clutter or unnecessary equipment. Stretcher in lowest position, wheels locked, appropriate side rails in place. Implemented All Universal Safety Interventions:  [place] to call system. Call bell, personal items and telephone within reach. Instruct patient to call for assistance. Room bathroom lighting operational. Non-slip footwear when patient is off stretcher. Physically safe environment: no spills, clutter or unnecessary equipment. Stretcher in lowest position, wheels locked, appropriate side rails in place. Implemented All Universal Safety Interventions:  [place] to call system. Call bell, personal items and telephone within reach. Instruct patient to call for assistance. Room bathroom lighting operational. Non-slip footwear when patient is off stretcher. Physically safe environment: no spills, clutter or unnecessary equipment. Stretcher in lowest position, wheels locked, appropriate side rails in place.

## 2022-08-15 NOTE — CHART NOTE - NSCHARTNOTEFT_GEN_A_CORE
- Repeat H/H was 7.7/24.1   - Pt slightly tachycardic, BP soft but stable; she is having anemia symptoms: dizziness, lightheadedness   - will transfuse 1 unit PRBCs   - f/u AM H/H - Repeat H/H was 7.7/24.1   - Pt slightly tachycardic, BP soft but stable; she is having anemia symptoms: dizziness, lightheadedness   - will transfuse 1 unit PRBCs   - f/u AM H/H  - discussed with Dr. Khanh Purdy

## 2022-08-15 NOTE — CONSULT NOTE ADULT - ASSESSMENT
[ASSESSMENT and  PLAN]    Menorrhagia    VWD  likely 2A  No clear hx of very good clinical response or lab response to DDAVP.     Symptomatic anemia due to rapid blood loss.     Fe def anemia  recent Ferritin 15 [08/08/22]  Hx of requiring IV iron.     RECOMMENDATIONS    Administer DDAVP 30-50 IU/kg. Dose 3000IU  Spoke with blood bank available dose 2161 IU/vial  Dose 4122 IU once.     Consider DDAVP IV if continue bleeding post DDAVP.   OK to continue TXA as needed.     Admit to medicine for observation for need for transfusion.   Monitor CBC    Transfuse PRBC as clinically indicated.   Transfuse PRBC if Hgb <7.0 or if symptomatic.   Follow CBC    Check Anemia studies.   Start PO B12 1000mcg daily.     DVT Prophylaxis  SCD  OOB as aaron    Thank you for consulting us.     DC planning when stable.     Discussed plan of care with patient and  in detail.   They expressed understanding of the treatment plan.   Risks, benefits and alternatives discussed in detail.   Opportunity given for questions and discussion.   Any questions or concerns all addressed and answered to their satisfaction, and in lay terms.     Discussed with Dr CALEB Purdy, and Dr Isabel Purdy.    > 71 minutes spent in direct patient care, examining and counseling patient,  reviewing  the notes, lab data/ imaging , discussion with multidisciplinary team.      [ASSESSMENT and  PLAN]    Menorrhagia    VWD  likely 2A  No clear hx of very good clinical response or lab response to DDAVP.     Symptomatic anemia due to rapid blood loss.     Fe def anemia  recent Ferritin 15 [08/08/22]  Hx of requiring IV iron.     RECOMMENDATIONS    Administer DDAVP 30-50 IU/kg. Dose 3000IU  Spoke with blood bank available dose 2161 IU/vial  Dose 4122 IU once.     Consider DDAVP IV if continue bleeding post DDAVP.   OK to continue TXA as needed.     Admit to medicine for observation for need for transfusion.   Monitor CBC    Transfuse PRBC as clinically indicated.   Transfuse PRBC if Hgb <7.0 or if symptomatic.   Follow CBC    Check Anemia studies.   Start PO B12 1000mcg daily.     DVT Prophylaxis  SCD  OOB as aaron    Thank you for consulting us.     DC planning when stable.     Discussed plan of care with patient and  in detail.   They expressed understanding of the treatment plan.   Risks, benefits and alternatives discussed in detail.   Opportunity given for questions and discussion.   Any questions or concerns all addressed and answered to their satisfaction, and in lay terms.     Discussed with Dr CALEB Purdy, and Dr Isabel uPrdy.    > 71 minutes spent in direct patient care, examining and counseling patient,  reviewing  the notes, lab data/ imaging , discussion with multidisciplinary team.

## 2022-08-15 NOTE — H&P ADULT - NSHPPHYSICALEXAM_GEN_ALL_CORE
T(C): 36.8 (08-15-22 @ 09:10), Max: 36.8 (08-15-22 @ 09:10)  HR: 79 (08-15-22 @ 09:10) (79 - 79)  BP: 87/62 (08-15-22 @ 09:10) (87/62 - 87/62)  RR: 18 (08-15-22 @ 09:10) (18 - 18)  SpO2: 99% (08-15-22 @ 09:10) (99% - 99%)  Wt(kg): --    Physical Exam:   GENERAL: well-groomed, well-developed, NAD  HEENT: head NC/AT; conjunctiva & sclera clear; hearing grossly intact, moist mucous membranes  NECK: supple, no JVD  RESPIRATORY: CTA B/L, no wheezing, rales, rhonchi or rubs  CARDIOVASCULAR: S1&S2, RRR, no murmurs or gallops  ABDOMEN: soft, non-tender, non-distended, + Bowel sounds x4 quadrants, no guarding, rebound or rigidity  MUSCULOSKELETAL:  no clubbing, cyanosis or edema of all 4 extremities  LYMPH: no cervical lymphadenopathy  VASCULAR: Radial pulses 2+ bilaterally, no varicose veins   SKIN: warm and dry, color normal  NEUROLOGIC: AA&O X3,  no sensory loss, motor Strength 5/5 in UE & LE B/L  Psych: Normal mood and affect, normal behavior

## 2022-08-15 NOTE — ED PROVIDER NOTE - PHYSICAL EXAMINATION
Gen: alert, NAD  HEENT:  NC/AT, PERR, no exophthalmos  CV:  well perfused, rrr   Pulm:  normal RR, I/E ratio and chest excursion  Abd: s/nt/nd  MSK: moving all extremities  Neuro:  non-focal  : deferred  Skin:  visualized areas intact  Psych: AOx3

## 2022-08-15 NOTE — ED ADULT NURSE REASSESSMENT NOTE - NS ED NURSE REASSESS COMMENT FT1
Pt received from Tanner MCCLELLAN, pt resting comfortably in bed at this time, offers no complaints.  Pt denies chest pain or SOB.  No n/v/d.  Pt states vaginal bleeding is much better at this time.  Blood transfusion initiated, no adverse reactions noted. Report given to Julieta MCCLELLAN.  Maintain comfort and safety.

## 2022-08-15 NOTE — H&P ADULT - NSHPOUTPATIENTPROVIDERS_GEN_ALL_CORE
GYN: Dr Daryl Morillo[[place], [place]] GYN: Dr Daryl Morillo[[place], [place]] GYN: Dr Daryl Morillo[[place], [place]]

## 2022-08-15 NOTE — ED PROVIDER NOTE - OBJECTIVE STATEMENT
This is a 36 year old female with hx of Von Willebrand disease and anemia, presenting to the ED with heavy vaginal bleeding and near syncope. Last night beginning at 11 pm she began having heavy vaginal bleeding passing large blood clots the size of golf balls. She used 15 pads since that time which were all soaked through. To try to help the bleeding, she took Tranexamic acid 1300 mg at 1 am, Tranexamic acid 1300 mg at 4:30 am, DDAVP nasal spray at 4 am, and Angeles OCP day 1 pill at 5 am. She continues to have vaginal bleeding. This morning she was getting up from going to the bathroom when she felt lightheaded and she almost fainted, and her  caught her. She denies head trauma from this episode. She currently has vaginal bleeding, dizziness, SOB, and abdominal cramping similar to her usual period cramps.  She had a miscarriage at 9 weeks gestation IVF pregnancy with D&C performed by Dr. Goldman at [facility] on 7/27/22. That was her first pregnancy. She was given DDAVP and tranexamic acid at that time. She was instructed to FU with Dr. Goldman 4 weeks after the procedure.   She had had heavy vaginal bleeding requiring blood transfusions in the past.  One week ago she was seen by her PCP, and she states at that time her Hgb=12, and Beta HCG=69.    Medications: DDAVP nasal spray prn, Tranexamic acid 1300 mg TID prn, Synthroid 25 mcg daily to increase fertility, Prenatal vitamin, CoQ10, Vit D 5000 u.  PMHx: Von Willebrand disease, anemia  PSHx: appendectomy in 2000. bilateral salpindectomy in 2021 due to complications from appendectomy.    Social: occasional alcohol use. Denies tobacco or drug use. This is a 36 year old female with hx of Von Willebrand disease and anemia, presenting to the ED with heavy vaginal bleeding and near syncope. Last night beginning at 11 pm she began having heavy vaginal bleeding passing large blood clots the size of golf balls. She used 15 pads since that time which were all soaked through. To try to help the bleeding, she took Tranexamic acid 1300 mg at 1 am, Tranexamic acid 1300 mg at 4:30 am, DDAVP nasal spray at 4 am, and Angeles OCP day 1 pill at 5 am. She continues to have vaginal bleeding. This morning she was getting up from going to the bathroom when she felt lightheaded and she almost fainted, and her  caught her. She denies head trauma from this episode. She currently has vaginal bleeding, dizziness, SOB, and abdominal cramping similar to her usual period cramps.  She had a miscarriage at 9 weeks gestation IVF pregnancy with D&C performed by Dr. Goldman at [facility] on 7/27/22. That was her first pregnancy. She was given DDAVP and tranexamic acid at that time. She was instructed to FU with Dr. Goldman 4 weeks after the procedure.   She had had heavy vaginal bleeding requiring blood transfusions in the past.  One week ago she was seen by her PCP, and she states at that time her Hgb=12, and Beta HCG=69.    Medications: DDAVP nasal spray prn, Tranexamic acid 1300 mg TID prn, Synthroid 25 mcg daily to increase fertility, Prenatal vitamin, CoQ10, Vit D 5000 u.  PMHx: Von Willebrand disease, anemia  PSHx: appendectomy in 2000. bilateral salpindectomy in 2021 due to complications from appendectomy.    Social: occasional alcohol use. Denies tobacco or drug use. This is a 36 year old female with hx of Von Willebrand disease and anemia, presenting to the ED with heavy vaginal bleeding and near syncope. Last night beginning at 11 pm she began having heavy vaginal bleeding passing large blood clots the size of golf balls. She used 15 pads since that time which were all soaked through. To try to help the bleeding, she took Tranexamic acid 1300 mg at 1 am, Tranexamic acid 1300 mg at 4:30 am, DDAVP nasal spray at 4 am, and Angeles OCP day 1 pill at 5 am. She continues to have vaginal bleeding. This morning she was getting up from going to the bathroom when she felt lightheaded and she almost fainted, and her  caught her. She denies head trauma from this episode. She currently has vaginal bleeding, dizziness, SOB, and abdominal cramping similar to her usual period cramps.  She had a miscarriage at 9 weeks gestation IVF pregnancy with D&C performed by Dr. Goldman at [facility] on 7/27/22. That was her first pregnancy. She was given DDAVP and tranexamic acid at that time. She was instructed to FU with Dr. Goldman 4 weeks after the procedure.   She had had heavy vaginal bleeding requiring blood transfusions in the past.  One week ago she was seen by her PCP, and she states at that time her Hgb=12, and Beta HCG=69.    Medications: DDAVP nasal spray prn, Tranexamic acid 1300 mg TID prn, Synthroid 25 mcg daily to increase fertility, Prenatal vitamin, CoQ10, Vit D 5000 u.  PMHx: Von Willebrand disease, anemia  PSHx: appendectomy in 2000. bilateral salpindectomy in 2021 due to complications from appendectomy.    Social: occasional alcohol use. Denies tobacco or drug use. This is a 36 year old female with hx of Von Willebrand disease and anemia, presenting to the ED with heavy vaginal bleeding and near syncope. Last night beginning at 11 pm she began having heavy vaginal bleeding passing large blood clots the size of golf balls. She used 15 pads since that time which were all soaked through. To try to help the bleeding, she took Tranexamic acid 1300 mg at 1 am, Tranexamic acid 1300 mg at 4:30 am, DDAVP nasal spray at 4 am, and Angeles OCP day 1 pill at 5 am. She continues to have vaginal bleeding. This morning she was getting up from going to the bathroom when she felt lightheaded and she almost fainted, and her  caught her. She currently has vaginal bleeding, dizziness, mild headache, SOB, and abdominal cramping similar to her usual period cramps. She denies head trauma from near-syncope, fever, chills, chest pain, abd pain, N/V.  She had a miscarriage at 9 weeks gestation IVF pregnancy with D&C performed by Dr. Goldman at [facility] on 7/27/22. That was her first pregnancy. She was given DDAVP and tranexamic acid at that time. She was instructed to FU with Dr. Goldman 4 weeks after the procedure.   She had had heavy vaginal bleeding requiring blood transfusions in the past.  One week ago she was seen by her PCP, and she states at that time her Hgb=12, and Beta HCG=69.    Medications: DDAVP nasal spray prn, Tranexamic acid 1300 mg TID prn, Synthroid 25 mcg daily to increase fertility, Prenatal vitamin, CoQ10, Vit D 5000 u.  PMHx: Von Willebrand disease, anemia  PSHx: appendectomy in 2000. bilateral salpindectomy in 2021 due to complications from appendectomy.    Social: occasional alcohol use. Denies tobacco or drug use. This is a 36 year old female with hx of Von Willebrand disease and anemia, presenting to the ED with heavy vaginal bleeding and near syncope. Last night beginning at 11 pm she began having heavy vaginal bleeding passing large blood clots the size of golf balls. She used 15 pads since that time which were all soaked through. To try to help the bleeding, she took Tranexamic acid 1300 mg at 1 am, Tranexamic acid 1300 mg at 4:30 am, DDAVP nasal spray at 4 am, and Angeles OCP day 1 pill at 5 am. She continues to have vaginal bleeding. This morning she was getting up from going to the bathroom when she felt lightheaded and she almost fainted, and her  caught her. She currently has vaginal bleeding, dizziness, mild headache, SOB, and abdominal cramping similar to her usual period cramps. She denies head trauma from near-syncope, fever, chills, chest pain, abd pain, N/V.  She had a miscarriage at 9 weeks gestation IVF pregnancy with D&C performed by Dr. Goldman at [facility] on 7/27/22. That was her first pregnancy. She was given DDAVP and tranexamic acid at that time. She was instructed to FU with Dr. Goldman 4 weeks after the procedure.   She had had heavy vaginal bleeding requiring blood transfusions in the past.  One week ago she was seen by her PCP, and she states at that time her Hgb=12, and Beta HCG=69.    Medications: DDAVP nasal spray prn, Tranexamic acid 1300 mg TID prn, Synthroid 25 mcg daily to increase fertility, Prenatal vitamin, CoQ10, Vit D 5000 u.  PMHx: Von Willebrand disease, anemia  PSHx: appendectomy in 2000. bilateral salpindectomy in 2021 due to complications from appendectomy.    Social: occasional alcohol use. Denies tobacco or drug use. This is a 36 year old female with hx of Von Willebrand disease and anemia, presenting to the ED with heavy vaginal bleeding and near syncope. Last night beginning at 11 pm she began having heavy vaginal bleeding passing large blood clots the size of golf balls. She used 15 pads since that time which were all soaked through. To try to help the bleeding, she took Tranexamic acid 1300 mg at 1 am, Tranexamic acid 1300 mg at 4:30 am, DDAVP nasal spray at 4 am, and Angeles OCP day 1 pill at 5 am. She continues to have vaginal bleeding. This morning she was getting up from going to the bathroom when she felt lightheaded and she almost fainted, and her  caught her. She currently has vaginal bleeding, dizziness, mild headache, SOB, and abdominal cramping similar to her usual period cramps. She denies head trauma from near-syncope, fever, chills, chest pain, abd pain, N/V.  She had a miscarriage at 9 weeks gestation IVF pregnancy with D&C performed by Dr. Goldman at [facility] on 7/27/22. That was her first pregnancy. She was given DDAVP and tranexamic acid at that time. She was instructed to FU with Dr. Goldman 4 weeks after the procedure.   She had had heavy vaginal bleeding requiring blood transfusions in the past.  One week ago she was seen by her PCP, and she states at that time her Hgb=12, and Beta HCG=69.    Medications: DDAVP nasal spray prn, Tranexamic acid 1300 mg TID prn, Synthroid 25 mcg daily to increase fertility, Prenatal vitamin, CoQ10, Vit D 5000 u.  PMHx: Von Willebrand disease, anemia  PSHx: appendectomy in 2000. bilateral salpindectomy in 2021 due to complications from appendectomy.    Social: occasional alcohol use. Denies tobacco or drug use.

## 2022-08-15 NOTE — PATIENT PROFILE ADULT - DOMESTIC TRAVEL HIGH RISK ALERT 1
[General Appearance - Alert] : alert [General Appearance - In No Acute Distress] : in no acute distress [place] [Sclera] : the sclera and conjunctiva were normal [PERRL With Normal Accommodation] : pupils were equal in size, round, and reactive to light [Extraocular Movements] : extraocular movements were intact [Outer Ear] : the ears and nose were normal in appearance [Oropharynx] : the oropharynx was normal [Neck Appearance] : the appearance of the neck was normal [Neck Cervical Mass (___cm)] : no neck mass was observed [Jugular Venous Distention Increased] : there was no jugular-venous distention [Thyroid Diffuse Enlargement] : the thyroid was not enlarged [Thyroid Nodule] : there were no palpable thyroid nodules [Exaggerated Use Of Accessory Muscles For Inspiration] : no accessory muscle use [Full Pulse] : the pedal pulses are present [Edema] : there was no peripheral edema [Abdomen Soft] : soft [Abdomen Tenderness] : non-tender [Abdomen Mass (___ Cm)] : no abdominal mass palpated [Cervical Lymph Nodes Enlarged Posterior Bilaterally] : posterior cervical [Cervical Lymph Nodes Enlarged Anterior Bilaterally] : anterior cervical [Supraclavicular Lymph Nodes Enlarged Bilaterally] : supraclavicular [Axillary Lymph Nodes Enlarged Bilaterally] : axillary [Femoral Lymph Nodes Enlarged Bilaterally] : femoral [Inguinal Lymph Nodes Enlarged Bilaterally] : inguinal [No CVA Tenderness] : no ~M costovertebral angle tenderness [No Spinal Tenderness] : no spinal tenderness [Abnormal Walk] : normal gait [Nail Clubbing] : no clubbing  or cyanosis of the fingernails [Musculoskeletal - Swelling] : no joint swelling seen [Motor Tone] : muscle strength and tone were normal [Skin Color & Pigmentation] : normal skin color and pigmentation [Skin Turgor] : normal skin turgor [] : no rash [Deep Tendon Reflexes (DTR)] : deep tendon reflexes were 2+ and symmetric [Sensation] : the sensory exam was normal to light touch and pinprick [No Focal Deficits] : no focal deficits [Oriented To Time, Place, And Person] : oriented to person, place, and time [Impaired Insight] : insight and judgment were intact [Affect] : the affect was normal [FreeTextEntry1] : large healed midline scar from previous abdominal surgery

## 2022-08-16 ENCOUNTER — TRANSCRIPTION ENCOUNTER (OUTPATIENT)
Age: 37
End: 2022-08-16

## 2022-08-16 VITALS
HEART RATE: 78 BPM | DIASTOLIC BLOOD PRESSURE: 68 MMHG | OXYGEN SATURATION: 99 % | SYSTOLIC BLOOD PRESSURE: 106 MMHG | RESPIRATION RATE: 18 BRPM | TEMPERATURE: 98 F

## 2022-08-16 LAB
ANION GAP SERPL CALC-SCNC: 6 MMOL/L — SIGNIFICANT CHANGE UP (ref 5–17)
BUN SERPL-MCNC: 6 MG/DL — LOW (ref 7–23)
CALCIUM SERPL-MCNC: 8.2 MG/DL — LOW (ref 8.5–10.1)
CHLORIDE SERPL-SCNC: 111 MMOL/L — HIGH (ref 96–108)
CO2 SERPL-SCNC: 24 MMOL/L — SIGNIFICANT CHANGE UP (ref 22–31)
CREAT SERPL-MCNC: 0.4 MG/DL — LOW (ref 0.5–1.3)
EGFR: 133 ML/MIN/1.73M2 — SIGNIFICANT CHANGE UP
GLUCOSE SERPL-MCNC: 95 MG/DL — SIGNIFICANT CHANGE UP (ref 70–99)
HCT VFR BLD CALC: 26.4 % — LOW (ref 34.5–45)
HGB BLD-MCNC: 8.5 G/DL — LOW (ref 11.5–15.5)
MCHC RBC-ENTMCNC: 28.2 PG — SIGNIFICANT CHANGE UP (ref 27–34)
MCHC RBC-ENTMCNC: 32.2 GM/DL — SIGNIFICANT CHANGE UP (ref 32–36)
MCV RBC AUTO: 87.7 FL — SIGNIFICANT CHANGE UP (ref 80–100)
NRBC # BLD: 0 /100 WBCS — SIGNIFICANT CHANGE UP (ref 0–0)
PLATELET # BLD AUTO: 177 K/UL — SIGNIFICANT CHANGE UP (ref 150–400)
POTASSIUM SERPL-MCNC: 3.9 MMOL/L — SIGNIFICANT CHANGE UP (ref 3.5–5.3)
POTASSIUM SERPL-SCNC: 3.9 MMOL/L — SIGNIFICANT CHANGE UP (ref 3.5–5.3)
RBC # BLD: 3.01 M/UL — LOW (ref 3.8–5.2)
RBC # FLD: 14.6 % — HIGH (ref 10.3–14.5)
SODIUM SERPL-SCNC: 141 MMOL/L — SIGNIFICANT CHANGE UP (ref 135–145)
VWF AG ACT/NOR PPP IA: 228 % — HIGH (ref 63–170)
VWF:RCO ACT/NOR PPP PL AGG: 207 % — HIGH (ref 45–133)
WBC # BLD: 7.52 K/UL — SIGNIFICANT CHANGE UP (ref 3.8–10.5)
WBC # FLD AUTO: 7.52 K/UL — SIGNIFICANT CHANGE UP (ref 3.8–10.5)

## 2022-08-16 PROCEDURE — 99285 EMERGENCY DEPT VISIT HI MDM: CPT | Mod: 25

## 2022-08-16 PROCEDURE — P9016: CPT

## 2022-08-16 PROCEDURE — U0005: CPT

## 2022-08-16 PROCEDURE — 76830 TRANSVAGINAL US NON-OB: CPT

## 2022-08-16 PROCEDURE — 86923 COMPATIBILITY TEST ELECTRIC: CPT

## 2022-08-16 PROCEDURE — 86901 BLOOD TYPING SEROLOGIC RH(D): CPT

## 2022-08-16 PROCEDURE — 85027 COMPLETE CBC AUTOMATED: CPT

## 2022-08-16 PROCEDURE — 83540 ASSAY OF IRON: CPT

## 2022-08-16 PROCEDURE — 86900 BLOOD TYPING SEROLOGIC ABO: CPT

## 2022-08-16 PROCEDURE — 85014 HEMATOCRIT: CPT

## 2022-08-16 PROCEDURE — 85018 HEMOGLOBIN: CPT

## 2022-08-16 PROCEDURE — 83550 IRON BINDING TEST: CPT

## 2022-08-16 PROCEDURE — 84702 CHORIONIC GONADOTROPIN TEST: CPT

## 2022-08-16 PROCEDURE — 80053 COMPREHEN METABOLIC PANEL: CPT

## 2022-08-16 PROCEDURE — 85245 CLOT FACTOR VIII VW RISTOCTN: CPT

## 2022-08-16 PROCEDURE — 85246 CLOT FACTOR VIII VW ANTIGEN: CPT

## 2022-08-16 PROCEDURE — 82728 ASSAY OF FERRITIN: CPT

## 2022-08-16 PROCEDURE — 85247 CLOT FACTOR VIII MULTIMETRIC: CPT

## 2022-08-16 PROCEDURE — 86850 RBC ANTIBODY SCREEN: CPT

## 2022-08-16 PROCEDURE — 82607 VITAMIN B-12: CPT

## 2022-08-16 PROCEDURE — 82746 ASSAY OF FOLIC ACID SERUM: CPT

## 2022-08-16 PROCEDURE — 99239 HOSP IP/OBS DSCHRG MGMT >30: CPT

## 2022-08-16 PROCEDURE — 36415 COLL VENOUS BLD VENIPUNCTURE: CPT

## 2022-08-16 PROCEDURE — 80048 BASIC METABOLIC PNL TOTAL CA: CPT

## 2022-08-16 PROCEDURE — U0003: CPT

## 2022-08-16 RX ORDER — PREGABALIN 225 MG/1
1 CAPSULE ORAL
Qty: 30 | Refills: 0
Start: 2022-08-16 | End: 2022-09-14

## 2022-08-16 RX ADMIN — Medication 25 MICROGRAM(S): at 05:31

## 2022-08-16 RX ADMIN — PREGABALIN 1000 MICROGRAM(S): 225 CAPSULE ORAL at 11:15

## 2022-08-16 NOTE — DISCHARGE NOTE PROVIDER - HOSPITAL COURSE
37 yo female, pmh of vonwillenbrand disease, admitted to medical floor for acute blood loss anemia 2/2 vaginal bleeding in setting of recent miscarriage 2 weeks ago. Patient was seen by GYN, hematology, received 1 unit prbc and humate with improvement in bleeding. Transvaginal ultrasound showed no retained POC.  Now improved, denies dizziness, LOC, able to be discharged home. Advised follow up with PCP, GYN, hematology in 1-2 weeks. 35 yo female, pmh of vonwillenbrand disease, admitted to medical floor for acute blood loss anemia 2/2 vaginal bleeding in setting of recent miscarriage 2 weeks ago. Patient was seen by GYN, hematology, received 1 unit prbc and humate with improvement in bleeding. Transvaginal ultrasound showed no retained POC.  Now improved, denies dizziness, LOC, able to be discharged home. Advised follow up with PCP, GYN, hematology in 1-2 weeks.

## 2022-08-16 NOTE — PROGRESS NOTE ADULT - SUBJECTIVE AND OBJECTIVE BOX
All interim records and events noted.    post Humate P and tx one unit PRBC, tolerated well. Minimal vaginal bleed n ow,  no abdomen pain or malaise      MEDICATIONS  (STANDING):  cyanocobalamin 1000 MICROGram(s) Oral daily  famotidine    Tablet 20 milliGRAM(s) Oral daily  iron sucrose Injectable 200 milliGRAM(s) IV Push every 24 hours  levothyroxine 25 MICROGram(s) Oral daily  sodium chloride 0.9%. 1000 milliLiter(s) (100 mL/Hr) IV Continuous <Continuous>    MEDICATIONS  (PRN):  acetaminophen     Tablet .. 650 milliGRAM(s) Oral every 6 hours PRN Temp greater or equal to 38C (100.4F), Mild Pain (1 - 3)      Vital Signs Last 24 Hrs  T(C): 36.9 (16 Aug 2022 05:04), Max: 36.9 (16 Aug 2022 05:04)  T(F): 98.4 (16 Aug 2022 05:04), Max: 98.4 (16 Aug 2022 05:04)  HR: 86 (16 Aug 2022 05:04) (78 - 100)  BP: 106/70 (16 Aug 2022 05:04) (88/54 - 108/77)  BP(mean): --  RR: 18 (16 Aug 2022 05:04) (16 - 18)  SpO2: 99% (16 Aug 2022 05:04) (99% - 100%)    Parameters below as of 16 Aug 2022 05:04  Patient On (Oxygen Delivery Method): room air        PHYSICAL EXAM  General: well developed  well nourished, in no acute distress  Head: atraumatic, normocephalic  ENT: sclera anicteric  Neck: supple, trachea midline, no palpable masses  CV: S1 S2, regular rate and rhythm  Lungs: clear to auscultation, no wheezes/rhonchi  Abdomen: soft, nontender, bowel sounds present, no palpable hepatosplenomegaly  Extrem: no clubbing/cyanosis/edema  Skin: no significant increased ecchymosis/petechiae  Neuro: alert and oriented X3,  no focal deficits      LABS:             8.5    7.52  )-----------( 177      ( 08-16 @ 06:05 )             26.4                7.7    x     )-----------( x        ( 08-15 @ 18:50 )             24.1                10.3   9.31  )-----------( 216      ( 08-15 @ 09:50 )             32.0       08-16    141  |  111<H>  |  6<L>  ----------------------------<  95  3.9   |  24  |  0.40<L>    Ca    8.2<L>      16 Aug 2022 06:05    TPro  6.7  /  Alb  3.4  /  TBili  0.7  /  DBili  x   /  AST  13<L>  /  ALT  16  /  AlkPhos  64  08-15        RADIOLOGY & ADDITIONAL STUDIES:    IMPRESSION/RECOMMENDATIONS:

## 2022-08-16 NOTE — DISCHARGE NOTE NURSING/CASE MANAGEMENT/SOCIAL WORK - NSDCPEFALRISK_GEN_ALL_CORE
For information on Fall & Injury Prevention, visit: [email or web address] OR  [email or web address]://[email or web address]-injury OR  https://www.cdc.gov/steadi/patient.html For information on Fall & Injury Prevention, visit: https://www.[facility]/news/fall-prevention-protects-[email or web address]-maintains-health-[email or web address]-mobility OR  https://www.[facility]/news/fall-prevention-tips-to-avoid-injury OR  https://www.cdc.gov/steadi/patient.html For information on Fall & Injury Prevention, visit: https://www.[facility]/news/fall-prevention-protects-and-maintains-health-and-mobility OR  https://www.[facility]/news/fall-prevention-tips-to-avoid-injury OR  https://www.cdc.gov/steadi/patient.html

## 2022-08-16 NOTE — DISCHARGE NOTE PROVIDER - NSDCMRMEDTOKEN_GEN_ALL_CORE_FT
desmopressin 10 mcg/inh nasal spray: 1 scoop(s) nasal once a day, As Needed  Prenatal 1: 1 cap(s) orally once a day  Synthroid 25 mcg (0.025 mg) oral tablet: 1 tab(s) orally once a day  tranexamic acid 650 mg oral tablet: 2 tab(s) orally every 8 hours, As Needed during menstruation    **Last dose taken 8/15/22 at 1am and 4:30am**  Vestura 3 mg-0.02 mg oral tablet: 1 tab(s) orally once a day

## 2022-08-16 NOTE — DISCHARGE NOTE PROVIDER - PROVIDER TOKENS
PROVIDER:[TOKEN:[[id number]],FOLLOWUP:[1 week]],PROVIDER:[TOKEN:[[id number]],FOLLOWUP:[1 week],ESTABLISHEDPATIENT:[T]],FREE:[LAST:[PCP],PHONE:[(   )    -],FAX:[(   )    -],FOLLOWUP:[2 weeks]]

## 2022-08-16 NOTE — DISCHARGE NOTE PROVIDER - NPI NUMBER (FOR SYSADMIN USE ONLY) :
[[id number]],[[id number]],[UNKNOWN] [[id number]],[[id number]],[UNKNOWN] [[id number]],[[id number]],[UNKNOWN]

## 2022-08-16 NOTE — DISCHARGE NOTE PROVIDER - CARE PROVIDER_API CALL
Janet Caceres)  Medical Oncology  [address], [address]  [place] [address]  Phone: [phone]  Fax: [phone]  Follow Up Time: 1 week    Daryl Morillo  OBSTETRICS AND GYNECOLOGY  [address] [place] [address]  Phone: [phone]  Fax: [phone]  Established Patient  Follow Up Time: 1 week    PCP,   Phone: (   )    -  Fax: (   )    -  Follow Up Time: 2 weeks   Janet Caceres)  Medical Oncology  [address], [address]  [place] [address]  Phone: [phone]  Fax: [phone]  Follow Up Time: 1 week    Daryl Morillo  OBSTETRICS AND GYNECOLOGY  [address] [place] [address]  Phone: [phone]  Fax: [phone]  Established Patient  Follow Up Time: 1 week    PCP,   Phone: (   )    -  Fax: (   )    -  Follow Up Time: 2 weeks   Janet Caceres)  Medical Oncology  [address], [address]  [place] [address]  Phone: [phone]  Fax: [phone]  Follow Up Time: 1 week    Daryl Morillo  OBSTETRICS AND GYNECOLOGY  [address] [place] [address]  Phone: [phone]  Fax: [phone]  Established Patient  Follow Up Time: 1 week    PCP,   Phone: (   )    -  Fax: (   )    -  Follow Up Time: 2 weeks

## 2022-08-16 NOTE — PROGRESS NOTE ADULT - ASSESSMENT
37 y/o woman w known Type IIA Von Willebrand Ds, usu followed by Heme in [place](where she used to reside), menses usu well controlled w OCP, when has been off in effort of pregnancy. Post D&E ~2 wks ago and recently started on vaginal/menstral bleed and became very heavy. Tried nasal DDAVP without effect at home.  Hgb noted decr to 7s, given  Humate P and one unit PRBC yesterday. Tolerated well    -menses much lighter, clinically well  -Hgb stable in 8s today  -pt eager to go home  -stable from Heme standpoint for discharge. Followup and management plans discussed w pt    discussed w Hospitalist  thank you, please call if any questions 37 y/o woman w known Type IIA Von Willebrand Ds, usu followed by Heme in [place](where she used to reside), menses usu well controlled w OCP, when has been off in effort of pregnancy. Post D&E ~2 wks ago and recently started on vaginal/menstral bleed and became very heavy. Tried nasal DDAVP without effect at home.  Hgb noted decr to 7s, given  Humate P and one unit PRBC yesterday. Tolerated well    -menses much lighter, clinically well  -Hgb stable in 8s today  -pt eager to go home  -stable from Heme standpoint for discharge. Followup and management plans discussed w pt    discussed w Hospitalist  thank you, please call if any questions 35 y/o woman w known Type IIA Von Willebrand Ds, usu followed by Heme in [place](where she used to reside), menses usu well controlled w OCP, when has been off in effort of pregnancy. Post D&E ~2 wks ago and recently started on vaginal/menstral bleed and became very heavy. Tried nasal DDAVP without effect at home.  Hgb noted decr to 7s, given  Humate P and one unit PRBC yesterday. Tolerated well    -menses much lighter, clinically well  -Hgb stable in 8s today  -pt eager to go home  -stable from Heme standpoint for discharge. Followup and management plans discussed w pt    discussed w Hospitalist  thank you, please call if any questions

## 2022-08-16 NOTE — DISCHARGE NOTE PROVIDER - NSDCCAREPROVSEEN_GEN_ALL_CORE_FT
Gwen, Kaden Caceres, Janet Vegas, Hon YENI Del Cid, Jackie Purdy, Khanh Estraad, Luis F Purdy, Bernarda Olmstead, Jayy Rosales, Leora Porter, April Merino, Lubna Gwen, Kaden Caceres, Janet Vegas, Hon YENI Del Cid, Jackie Purdy, Khanh Estrada, Luis F Purdy, Bernarda Olmstead, Jayy Rosales, Leora Porter, April Merino, Lubna

## 2022-08-16 NOTE — DISCHARGE NOTE PROVIDER - NSDCCPCAREPLAN_GEN_ALL_CORE_FT
PRINCIPAL DISCHARGE DIAGNOSIS  Diagnosis: Vaginal bleeding  Assessment and Plan of Treatment: improved, see GYN in 1 week      SECONDARY DISCHARGE DIAGNOSES  Diagnosis: Iron deficiency anemia  Assessment and Plan of Treatment: take oral iron once a day on DC, follow up with hematology in 1-2 weeks.    Diagnosis: Von Willebrand disease  Assessment and Plan of Treatment:     Diagnosis: Hypothyroidism  Assessment and Plan of Treatment:

## 2022-08-16 NOTE — DISCHARGE NOTE NURSING/CASE MANAGEMENT/SOCIAL WORK - PATIENT PORTAL LINK FT
You can access the FollowMyHealth Patient Portal offered by [facility] by registering at the following website: [email or web address]. By joining [employer]’s FollowMyHealth portal, you will also be able to view your health information using other applications (apps) compatible with our system. You can access the FollowMyHealth Patient Portal offered by [facility] by registering at the following website: [email or web address]. By joining [employer]’s FollowMyHealth portal, you will also be able to view your health information using other applications (apps) compatible with our system. You can access the FollowMyHealth Patient Portal offered by [facility] by registering at the following website: [email or web address]. By joining [employer]’s FollowMyHealth portal, you will also be able to view your health information using other applications (apps) compatible with our system.

## 2022-08-23 ENCOUNTER — APPOINTMENT (OUTPATIENT)
Dept: INFUSION THERAPY | Facility: CLINIC | Age: 37
End: 2022-08-23

## 2022-08-23 ENCOUNTER — LABORATORY RESULT (OUTPATIENT)
Age: 37
End: 2022-08-23

## 2022-08-23 LAB
HCT VFR BLD CALC: 33.1 %
HGB BLD-MCNC: 10.6 G/DL
MCHC RBC-ENTMCNC: 29.2 PG
MCHC RBC-ENTMCNC: 32 G/DL
MCV RBC AUTO: 91.2 FL
PLATELET # BLD AUTO: 259 K/UL
PMV BLD: 8.6 FL
RBC # BLD: 3.63 M/UL
RBC # FLD: 17.7 %
WBC # FLD AUTO: 7.66 K/UL

## 2022-08-23 RX ORDER — IRON SUCROSE 20 MG/ML
200 INJECTION, SOLUTION INTRAVENOUS ONCE
Refills: 0 | Status: COMPLETED | OUTPATIENT
Start: 2022-08-23 | End: 2022-08-23

## 2022-08-23 RX ADMIN — IRON SUCROSE 220 MILLIGRAM(S): 20 INJECTION, SOLUTION INTRAVENOUS at 14:06

## 2022-08-25 LAB
VWF CBA/VWF AG PPP IA-RTO: SIGNIFICANT CHANGE UP

## 2022-08-26 ENCOUNTER — APPOINTMENT (OUTPATIENT)
Dept: INFUSION THERAPY | Facility: CLINIC | Age: 37
End: 2022-08-26

## 2022-08-26 RX ORDER — IRON SUCROSE 20 MG/ML
200 INJECTION, SOLUTION INTRAVENOUS ONCE
Refills: 0 | Status: COMPLETED | OUTPATIENT
Start: 2022-08-26 | End: 2022-08-26

## 2022-08-26 RX ADMIN — IRON SUCROSE 220 MILLIGRAM(S): 20 INJECTION, SOLUTION INTRAVENOUS at 14:27

## 2022-09-02 ENCOUNTER — APPOINTMENT (OUTPATIENT)
Dept: INFUSION THERAPY | Facility: CLINIC | Age: 37
End: 2022-09-02

## 2022-09-02 RX ORDER — IRON SUCROSE 20 MG/ML
200 INJECTION, SOLUTION INTRAVENOUS ONCE
Refills: 0 | Status: COMPLETED | OUTPATIENT
Start: 2022-09-02 | End: 2022-09-02

## 2022-09-02 RX ADMIN — IRON SUCROSE 220 MILLIGRAM(S): 20 INJECTION, SOLUTION INTRAVENOUS at 11:00

## 2022-09-02 RX ADMIN — IRON SUCROSE 200 MILLIGRAM(S): 20 INJECTION, SOLUTION INTRAVENOUS at 11:30

## 2022-09-09 ENCOUNTER — APPOINTMENT (OUTPATIENT)
Dept: INFUSION THERAPY | Facility: CLINIC | Age: 37
End: 2022-09-09

## 2022-09-09 ENCOUNTER — APPOINTMENT (OUTPATIENT)
Dept: HEMATOLOGY ONCOLOGY | Facility: CLINIC | Age: 37
End: 2022-09-09

## 2022-09-09 VITALS
DIASTOLIC BLOOD PRESSURE: 65 MMHG | SYSTOLIC BLOOD PRESSURE: 122 MMHG | BODY MASS INDEX: 22.88 KG/M2 | TEMPERATURE: 98.1 F | HEIGHT: 64 IN | HEART RATE: 68 BPM | WEIGHT: 134 LBS

## 2022-09-09 LAB
BASOPHILS # BLD AUTO: 0.03 K/UL
BASOPHILS NFR BLD AUTO: 0.4 %
EOSINOPHIL # BLD AUTO: 0.14 K/UL
EOSINOPHIL NFR BLD AUTO: 1.8 %
HCT VFR BLD CALC: 30.3 %
HGB BLD-MCNC: 9.9 G/DL
IMM GRANULOCYTES NFR BLD AUTO: 0.4 %
IRON SATN MFR SERPL: 61 %
IRON SERPL-MCNC: 200 UG/DL
LYMPHOCYTES # BLD AUTO: 2.84 K/UL
LYMPHOCYTES NFR BLD AUTO: 36.2 %
MAN DIFF?: NORMAL
MCHC RBC-ENTMCNC: 30.9 PG
MCHC RBC-ENTMCNC: 32.7 G/DL
MCV RBC AUTO: 94.7 FL
MONOCYTES # BLD AUTO: 0.37 K/UL
MONOCYTES NFR BLD AUTO: 4.7 %
NEUTROPHILS # BLD AUTO: 4.44 K/UL
NEUTROPHILS NFR BLD AUTO: 56.5 %
PLATELET # BLD AUTO: 238 K/UL
RBC # BLD: 3.2 M/UL
RBC # FLD: 16.6 %
TIBC SERPL-MCNC: 327 UG/DL
UIBC SERPL-MCNC: 127 UG/DL
WBC # FLD AUTO: 7.85 K/UL

## 2022-09-09 PROCEDURE — 99213 OFFICE O/P EST LOW 20 MIN: CPT

## 2022-09-09 RX ORDER — IRON SUCROSE 20 MG/ML
200 INJECTION, SOLUTION INTRAVENOUS ONCE
Refills: 0 | Status: COMPLETED | OUTPATIENT
Start: 2022-09-09 | End: 2022-09-09

## 2022-09-09 RX ADMIN — IRON SUCROSE 220 MILLIGRAM(S): 20 INJECTION, SOLUTION INTRAVENOUS at 13:21

## 2022-09-10 LAB — FERRITIN SERPL-MCNC: 149 NG/ML

## 2022-09-12 NOTE — ASSESSMENT
[FreeTextEntry1] : 36 year old female with VWD , s/p IVF and miscarriage at 10 weeks gestation .\par Persistent bleeding , suspected retained placental tissue . \par Hgb : 9.9 \par plan : ferritin , continue with venofer as needed.\par           follow p for endometrial biopsy result , may require D and E of retained placental tissue . \par          continue tranexemic acid .

## 2022-09-12 NOTE — HISTORY OF PRESENT ILLNESS
[de-identified] : 34 year old female with Von Willebrand disease type 1 , DDAVP responsive , diagnosed in her teens with menorrhagia , severe anemia required transfusions and parenteral iron . \par She had only modest response to intranasal DDAVP and tranexamic acid however menstrual bleeding is controlled with OCPs and has not required transfusions in many years . Most recent Hb is normal . She continues to experience excessive bleeding with dental work . \par  [de-identified] : 09/25/2020 Today's encounter was carried out via telehealth at her request and in compliance with [facility] guidelines given the extreme circumstances surrounding Covid 19 out break . 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 Her menstrual bleeding is well controlled with OCPs but continues to report excessive bleeding with dental work ( improved slightly with tranexamic acid ) . Denies any other bleeding symptoms . \par She is now scheduled for egg harvest in early October . \par \par 9/9/2022 Patient returns one month after spontaneous fetal loss at 9 1/2 weeks , pathology was inconclusive . Since then she has persistent bleeding , heavy and period like at times . She received one unit rbc and  Humate-P 3 weeks ago for Hgb < 8 . She had  biopsy today for mild endometrial thickening , she resumed OCPs ,  is using tranexemic acid and intranasal DDAVP . She notes some reduction in the bleeding . she has a also received venofer X 4 in last 3 weeks . She denies weakness, dizziness or SOB .

## 2022-09-16 ENCOUNTER — APPOINTMENT (OUTPATIENT)
Dept: INFUSION THERAPY | Facility: CLINIC | Age: 37
End: 2022-09-16

## 2022-09-20 ENCOUNTER — APPOINTMENT (OUTPATIENT)
Dept: HEMATOLOGY ONCOLOGY | Facility: CLINIC | Age: 37
End: 2022-09-20

## 2022-09-22 ENCOUNTER — APPOINTMENT (OUTPATIENT)
Dept: HEMATOLOGY ONCOLOGY | Facility: CLINIC | Age: 37
End: 2022-09-22

## 2022-09-22 VITALS
TEMPERATURE: 97.1 F | HEART RATE: 72 BPM | WEIGHT: 132 LBS | DIASTOLIC BLOOD PRESSURE: 65 MMHG | BODY MASS INDEX: 22.53 KG/M2 | OXYGEN SATURATION: 98 % | SYSTOLIC BLOOD PRESSURE: 106 MMHG | HEIGHT: 64 IN | RESPIRATION RATE: 16 BRPM

## 2022-09-22 PROCEDURE — 99213 OFFICE O/P EST LOW 20 MIN: CPT

## 2022-09-24 NOTE — ASSESSMENT
[FreeTextEntry1] : 36 year old female with VWD , s/p IVF and miscarriage at 10 weeks gestation.\par Persistent bleeding, suspected retained placental tissue. Passed retained tissue on 9/19/22, confirmed with US. Now bleeding is only spotting.\par \par Plan : \par CBC results from today is pending.\par Continue Tranexemic acid as needed, eRx sent.\par Follow up as needed.\par \par Case was seen and discussed with Dr. Ivan who agreed with assessment and plan.\par

## 2022-09-28 ENCOUNTER — OUTPATIENT (OUTPATIENT)
Dept: OUTPATIENT SERVICES | Facility: HOSPITAL | Age: 37
LOS: 1 days | End: 2022-09-28

## 2022-09-28 ENCOUNTER — APPOINTMENT (OUTPATIENT)
Dept: HEMATOLOGY ONCOLOGY | Facility: CLINIC | Age: 37
End: 2022-09-28

## 2022-09-28 DIAGNOSIS — D68.0 VON WILLEBRAND DISEASE: ICD-10-CM

## 2022-09-28 DIAGNOSIS — D50.9 IRON DEFICIENCY ANEMIA, UNSPECIFIED: ICD-10-CM

## 2022-09-28 DIAGNOSIS — Z98.890 OTHER SPECIFIED POSTPROCEDURAL STATES: Chronic | ICD-10-CM

## 2022-09-28 DIAGNOSIS — Z90.49 ACQUIRED ABSENCE OF OTHER SPECIFIED PARTS OF DIGESTIVE TRACT: Chronic | ICD-10-CM

## 2022-09-28 LAB
BASOPHILS # BLD AUTO: 0.03 K/UL
BASOPHILS NFR BLD AUTO: 0.6 %
EOSINOPHIL # BLD AUTO: 0.2 K/UL
EOSINOPHIL NFR BLD AUTO: 3.7 %
HCT VFR BLD CALC: 32.3 %
HGB BLD-MCNC: 10.2 G/DL
IMM GRANULOCYTES NFR BLD AUTO: 0.4 %
LYMPHOCYTES # BLD AUTO: 1.53 K/UL
LYMPHOCYTES NFR BLD AUTO: 28.2 %
MAN DIFF?: NORMAL
MCHC RBC-ENTMCNC: 29.5 PG
MCHC RBC-ENTMCNC: 31.6 G/DL
MCV RBC AUTO: 93.4 FL
MONOCYTES # BLD AUTO: 0.46 K/UL
MONOCYTES NFR BLD AUTO: 8.5 %
NEUTROPHILS # BLD AUTO: 3.18 K/UL
NEUTROPHILS NFR BLD AUTO: 58.6 %
PLATELET # BLD AUTO: 224 K/UL
RBC # BLD: 3.46 M/UL
RBC # FLD: 14.4 %
WBC # FLD AUTO: 5.42 K/UL

## 2022-11-03 ENCOUNTER — NON-APPOINTMENT (OUTPATIENT)
Age: 37
End: 2022-11-03

## 2022-11-04 ENCOUNTER — EMERGENCY (EMERGENCY)
Facility: HOSPITAL | Age: 37
LOS: 1 days | Discharge: HOME | End: 2022-11-04
Attending: EMERGENCY MEDICINE | Admitting: EMERGENCY MEDICINE
Payer: COMMERCIAL

## 2022-11-04 VITALS
SYSTOLIC BLOOD PRESSURE: 124 MMHG | RESPIRATION RATE: 18 BRPM | WEIGHT: 134.92 LBS | HEIGHT: 64 IN | OXYGEN SATURATION: 98 % | DIASTOLIC BLOOD PRESSURE: 82 MMHG | TEMPERATURE: 98 F | HEART RATE: 71 BPM

## 2022-11-04 DIAGNOSIS — Z98.890 OTHER SPECIFIED POSTPROCEDURAL STATES: Chronic | ICD-10-CM

## 2022-11-04 DIAGNOSIS — Z90.49 ACQUIRED ABSENCE OF OTHER SPECIFIED PARTS OF DIGESTIVE TRACT: Chronic | ICD-10-CM

## 2022-11-04 LAB
ALBUMIN SERPL ELPH-MCNC: 3.7 G/DL — SIGNIFICANT CHANGE UP (ref 3.3–5)
ALBUMIN SERPL ELPH-MCNC: 3.7 G/DL — SIGNIFICANT CHANGE UP (ref 3.3–5)
ALP SERPL-CCNC: 65 U/L — SIGNIFICANT CHANGE UP (ref 40–120)
ALT FLD-CCNC: 23 U/L — SIGNIFICANT CHANGE UP (ref 12–78)
ANION GAP SERPL CALC-SCNC: 6 MMOL/L — SIGNIFICANT CHANGE UP (ref 5–17)
APPEARANCE UR: CLEAR — SIGNIFICANT CHANGE UP
AST SERPL-CCNC: 19 U/L — SIGNIFICANT CHANGE UP (ref 15–37)
BASOPHILS # BLD AUTO: 0.02 K/UL — SIGNIFICANT CHANGE UP (ref 0–0.2)
BASOPHILS NFR BLD AUTO: 0.3 % — SIGNIFICANT CHANGE UP (ref 0–2)
BILIRUB SERPL-MCNC: 0.2 MG/DL — SIGNIFICANT CHANGE UP (ref 0.2–1.2)
BILIRUB UR-MCNC: NEGATIVE — SIGNIFICANT CHANGE UP
BUN SERPL-MCNC: 11 MG/DL — SIGNIFICANT CHANGE UP (ref 7–23)
BUN SERPL-MCNC: 11 MG/DL — SIGNIFICANT CHANGE UP (ref 7–23)
CALCIUM SERPL-MCNC: 9 MG/DL — SIGNIFICANT CHANGE UP (ref 8.5–10.1)
CHLORIDE SERPL-SCNC: 110 MMOL/L — HIGH (ref 96–108)
CO2 SERPL-SCNC: 27 MMOL/L — SIGNIFICANT CHANGE UP (ref 22–31)
CO2 SERPL-SCNC: 27 MMOL/L — SIGNIFICANT CHANGE UP (ref 22–31)
COLOR SPEC: SIGNIFICANT CHANGE UP
CREAT SERPL-MCNC: 0.58 MG/DL — SIGNIFICANT CHANGE UP (ref 0.5–1.3)
DIFF PNL FLD: NEGATIVE — SIGNIFICANT CHANGE UP
EGFR: 121 ML/MIN/1.73M2 — SIGNIFICANT CHANGE UP
EGFR: 121 ML/MIN/1.73M2 — SIGNIFICANT CHANGE UP
EOSINOPHIL # BLD AUTO: 0.23 K/UL — SIGNIFICANT CHANGE UP (ref 0–0.5)
EOSINOPHIL NFR BLD AUTO: 3.1 % — SIGNIFICANT CHANGE UP (ref 0–6)
GLUCOSE SERPL-MCNC: 126 MG/DL — HIGH (ref 70–99)
GLUCOSE SERPL-MCNC: 126 MG/DL — HIGH (ref 70–99)
GLUCOSE UR QL: NEGATIVE — SIGNIFICANT CHANGE UP
HCG SERPL-ACNC: <1 MIU/ML — SIGNIFICANT CHANGE UP
HCT VFR BLD CALC: 39.4 % — SIGNIFICANT CHANGE UP (ref 34.5–45)
HGB BLD-MCNC: 12.2 G/DL — SIGNIFICANT CHANGE UP (ref 11.5–15.5)
IMM GRANULOCYTES NFR BLD AUTO: 0.3 % — SIGNIFICANT CHANGE UP (ref 0–0.9)
KETONES UR-MCNC: NEGATIVE — SIGNIFICANT CHANGE UP
LEUKOCYTE ESTERASE UR-ACNC: NEGATIVE — SIGNIFICANT CHANGE UP
LIDOCAIN IGE QN: 158 U/L — SIGNIFICANT CHANGE UP (ref 73–393)
LIDOCAIN IGE QN: 158 U/L — SIGNIFICANT CHANGE UP (ref 73–393)
LYMPHOCYTES # BLD AUTO: 2.33 K/UL — SIGNIFICANT CHANGE UP (ref 1–3.3)
LYMPHOCYTES # BLD AUTO: 31.3 % — SIGNIFICANT CHANGE UP (ref 13–44)
MCHC RBC-ENTMCNC: 27.9 PG — SIGNIFICANT CHANGE UP (ref 27–34)
MCHC RBC-ENTMCNC: 31 GM/DL — LOW (ref 32–36)
MCV RBC AUTO: 90 FL — SIGNIFICANT CHANGE UP (ref 80–100)
MONOCYTES # BLD AUTO: 0.34 K/UL — SIGNIFICANT CHANGE UP (ref 0–0.9)
MONOCYTES NFR BLD AUTO: 4.6 % — SIGNIFICANT CHANGE UP (ref 2–14)
NEUTROPHILS # BLD AUTO: 4.5 K/UL — SIGNIFICANT CHANGE UP (ref 1.8–7.4)
NEUTROPHILS NFR BLD AUTO: 60.4 % — SIGNIFICANT CHANGE UP (ref 43–77)
NITRITE UR-MCNC: NEGATIVE — SIGNIFICANT CHANGE UP
NRBC # BLD: 0 /100 WBCS — SIGNIFICANT CHANGE UP (ref 0–0)
PH UR: 8 — SIGNIFICANT CHANGE UP (ref 5–8)
PLATELET # BLD AUTO: 227 K/UL — SIGNIFICANT CHANGE UP (ref 150–400)
POTASSIUM SERPL-MCNC: 3.9 MMOL/L — SIGNIFICANT CHANGE UP (ref 3.5–5.3)
POTASSIUM SERPL-SCNC: 3.9 MMOL/L — SIGNIFICANT CHANGE UP (ref 3.5–5.3)
PROT SERPL-MCNC: 7.6 G/DL — SIGNIFICANT CHANGE UP (ref 6–8.3)
PROT UR-MCNC: NEGATIVE — SIGNIFICANT CHANGE UP
RBC # BLD: 4.38 M/UL — SIGNIFICANT CHANGE UP (ref 3.8–5.2)
RBC # FLD: 12.9 % — SIGNIFICANT CHANGE UP (ref 10.3–14.5)
SODIUM SERPL-SCNC: 143 MMOL/L — SIGNIFICANT CHANGE UP (ref 135–145)
SP GR SPEC: 1.01 — SIGNIFICANT CHANGE UP (ref 1.01–1.02)
UROBILINOGEN FLD QL: NEGATIVE — SIGNIFICANT CHANGE UP
WBC # BLD: 7.44 K/UL — SIGNIFICANT CHANGE UP (ref 3.8–10.5)
WBC # FLD AUTO: 7.44 K/UL — SIGNIFICANT CHANGE UP (ref 3.8–10.5)

## 2022-11-04 PROCEDURE — 36415 COLL VENOUS BLD VENIPUNCTURE: CPT

## 2022-11-04 PROCEDURE — 80053 COMPREHEN METABOLIC PANEL: CPT

## 2022-11-04 PROCEDURE — 96375 TX/PRO/DX INJ NEW DRUG ADDON: CPT

## 2022-11-04 PROCEDURE — 76830 TRANSVAGINAL US NON-OB: CPT

## 2022-11-04 PROCEDURE — 85025 COMPLETE CBC W/AUTO DIFF WBC: CPT

## 2022-11-04 PROCEDURE — 84702 CHORIONIC GONADOTROPIN TEST: CPT

## 2022-11-04 PROCEDURE — 99285 EMERGENCY DEPT VISIT HI MDM: CPT

## 2022-11-04 PROCEDURE — 74176 CT ABD & PELVIS W/O CONTRAST: CPT | Mod: 26,MA

## 2022-11-04 PROCEDURE — 83690 ASSAY OF LIPASE: CPT

## 2022-11-04 PROCEDURE — 96374 THER/PROPH/DIAG INJ IV PUSH: CPT

## 2022-11-04 PROCEDURE — 76830 TRANSVAGINAL US NON-OB: CPT | Mod: 26

## 2022-11-04 PROCEDURE — 81003 URINALYSIS AUTO W/O SCOPE: CPT

## 2022-11-04 PROCEDURE — 74176 CT ABD & PELVIS W/O CONTRAST: CPT | Mod: MA

## 2022-11-04 PROCEDURE — 99284 EMERGENCY DEPT VISIT MOD MDM: CPT | Mod: 25

## 2022-11-04 RX ORDER — OXYCODONE AND ACETAMINOPHEN 5; 325 MG/1; MG/1
1 TABLET ORAL ONCE
Refills: 0 | Status: DISCONTINUED | OUTPATIENT
Start: 2022-11-04 | End: 2022-11-04

## 2022-11-04 RX ORDER — ONDANSETRON 8 MG/1
4 TABLET, FILM COATED ORAL ONCE
Refills: 0 | Status: COMPLETED | OUTPATIENT
Start: 2022-11-04 | End: 2022-11-04

## 2022-11-04 RX ORDER — OXYCODONE AND ACETAMINOPHEN 5; 325 MG/1; MG/1
1 TABLET ORAL
Qty: 6 | Refills: 0
Start: 2022-11-04

## 2022-11-04 RX ORDER — KETOROLAC TROMETHAMINE 30 MG/ML
15 SYRINGE (ML) INJECTION ONCE
Refills: 0 | Status: DISCONTINUED | OUTPATIENT
Start: 2022-11-04 | End: 2022-11-04

## 2022-11-04 RX ORDER — SODIUM CHLORIDE 9 MG/ML
1000 INJECTION INTRAMUSCULAR; INTRAVENOUS; SUBCUTANEOUS ONCE
Refills: 0 | Status: COMPLETED | OUTPATIENT
Start: 2022-11-04 | End: 2022-11-04

## 2022-11-04 RX ORDER — MORPHINE SULFATE 50 MG/1
4 CAPSULE, EXTENDED RELEASE ORAL ONCE
Refills: 0 | Status: DISCONTINUED | OUTPATIENT
Start: 2022-11-04 | End: 2022-11-04

## 2022-11-04 RX ADMIN — MORPHINE SULFATE 4 MILLIGRAM(S): 50 CAPSULE, EXTENDED RELEASE ORAL at 18:36

## 2022-11-04 RX ADMIN — Medication 15 MILLIGRAM(S): at 22:15

## 2022-11-04 RX ADMIN — ONDANSETRON 4 MILLIGRAM(S): 8 TABLET, FILM COATED ORAL at 18:36

## 2022-11-04 RX ADMIN — SODIUM CHLORIDE 1000 MILLILITER(S): 9 INJECTION INTRAMUSCULAR; INTRAVENOUS; SUBCUTANEOUS at 18:30

## 2022-11-04 RX ADMIN — OXYCODONE AND ACETAMINOPHEN 1 TABLET(S): 5; 325 TABLET ORAL at 22:15

## 2022-11-04 NOTE — ED PROVIDER NOTE - NSFOLLOWUPINSTRUCTIONS_ED_ALL_ED_FT
1) Follow-up with your Primary Medical Doctor. Call next business day for prompt follow-up.  2) Return to Emergency room for any worsening or persistent pain, weakness, fever, vomiting, unable ot eat /drink, or any other concerning symptoms.  3) See attached instruction sheets for additional information, including information regarding signs and symptoms to look out for, reasons to seek immediate care and other important instructions.  4) Follow-up with your OBGYN on Monday as discussed  5) Naproxen every 12 hours as needed, with food  6) Percocet as needed for moderate pain, no driving

## 2022-11-04 NOTE — ED PROVIDER NOTE - CARE PROVIDER_API CALL
James Martinez)  Obstetrics and Gynecology  [address] [place] [address]  Phone: [phone]  Fax: [phone]  Follow Up Time:    James Martinez)  Obstetrics and Gynecology  [address] [place] [address]  Phone: [phone]  Fax: [phone]  Follow Up Time:    James Martinez)  Obstetrics and Gynecology  [address] [place] [address]  Phone: [phone]  Fax: [phone]  Follow Up Time:

## 2022-11-04 NOTE — ED PROVIDER NOTE - OBJECTIVE STATEMENT
37-year-old female with history of von Willebrand's disease, appendicitis status post appendectomy, status post hormone therapy for egg retrieval 6 months ago, history of having no ovarian fallopian tubes presents with right lower quadrant abdominal pain since this morning.  No fevers or chills.  No acute nausea or vomiting.  No diarrhea.  No vaginal bleeding or vaginal discharge.  Patient was seen by urgent care, slight hematuria noted.  No aggravating or alleviating factors.  No cough/URI.  No numbness/tingling/focal weakness.  Patient denies possibility of pregnancy.  No aggravating or alleviating factors otherwise noted.  No other acute complaints.  Patient fully vaccinated for COVID.

## 2022-11-04 NOTE — ED ADULT TRIAGE NOTE - CHIEF COMPLAINT QUOTE
Pt ambulatory to triage c/o RLQ abd pain radiating to R flank and R groin since this morning.  Pt denies dysuria/hematuria, denies n/v/d/fevers.   Abd soft.  Pt currently receiving IVF treatments with collection of eggs 5-6 months ago, Pt states "francisco been measuring my ovulation and I know its happening now on the R side but this feels more painful, I have benign cysts but its never been an issues".   BN

## 2022-11-04 NOTE — ED ADULT NURSE NOTE - NSIMPLEMENTINTERV_GEN_ALL_ED
Implemented All Universal Safety Interventions:  [place] to call system. Call bell, personal items and telephone within reach. Instruct patient to call for assistance. Room bathroom lighting operational. Non-slip footwear when patient is off stretcher. Physically safe environment: no spills, clutter or unnecessary equipment. Stretcher in lowest position, wheels locked, appropriate side rails in place. Implemented All Universal Safety Interventions:  [place] to call system. Call bell, personal items and telephone within reach. Instruct patient to call for assistance. Room bathroom lighting operational. Non-slip footwear when patient is off stretcher. Physically safe environment: no spills, clutter or unnecessary equipment. Stretcher in lowest position, wheels locked, appropriate side rails in place. Implemented All Universal Safety Interventions:  [place] to call system. Call bell, personal items and telephone within reach. Instruct patient to call for assistance. Room bathroom lighting operational. Non-slip footwear when patient is off stretcher. Physically safe environment: no spills, clutter or unnecessary equipment. Stretcher in lowest position, wheels locked, appropriate side rails in place.

## 2022-11-04 NOTE — ED PROVIDER NOTE - PROGRESS NOTE DETAILS
Pt doing well, pain well controlled, no acute co.  Dw Dr James Martinez - states ok to dc home, pt can go to [place]  this weekend if sx / pain worsens, otherwise she can call office on monday for prompt fu.  Reevaluated patient at bedside.  Patient feeling much improved.  Discussed the results of all diagnostic testing in ED and copies of all reports given.   An opportunity to ask questions was given.  Discussed the importance of prompt, close medical follow-up.  Patient will return with any changes, concerns or persistent / worsening symptoms.  Understanding of all instructions verbalized. Discussed ov torsion prec/ inst and importance of close, prompt fu with GYN if sx worsens. Pt doing well, pain well controlled, no acute co.  Dw Dr James Martinez - states ok to dc home, pt can go to [place]  this weekend if sx / pain worsens, otherwise she can call office on monday for prompt fu.  Reevaluated patient at bedside.  Patient feeling much improved.  Discussed the results of all diagnostic testing in ED and copies of all reports given.   An opportunity to ask questions was given.  Discussed the importance of prompt, close medical follow-up.  Patient will return with any changes, concerns or persistent / worsening symptoms.  Understanding of all instructions verbalized. Discussed ov torsion prec/ inst and importance of close, prompt fu with GYN if sx worsens. Pt doing well, pain well controlled, no acute co.  Dw Dr James Martinez - states ok to dc home, pt can go to [place]  this weekend if sx / pain worsens, otherwise she can call office on monday for prompt fu.  Reevaluated patient at bedside.  Patient feeling much improved.  Discussed the results of all diagnostic testing in ED and copies of all reports given.   An opportunity to ask questions was given.  Discussed the importance of prompt, close medical follow-up.  Patient will return with any changes, concerns or persistent / worsening symptoms.  Understanding of all instructions verbalized. Discussed ov torsion prec/ inst and importance of close, prompt fu with GYN if sx worsens.

## 2022-11-04 NOTE — ED PROVIDER NOTE - PATIENT PORTAL LINK FT
You can access the FollowMyHealth Patient Portal offered by [facility] by registering at the following website: [email or web address]. By joining [employer]’s FollowMyHealth portal, you will also be able to view your health information using other applications (apps) compatible with our system. You can access the FollowMyHealth Patient Portal offered by [facility] by registering at the following website: [email or web address]. By joining [employer]’s FollowMyHealth portal, you will also be able to view your health information using other applications (apps) compatible with our system. You can access the FollowMyHealth Patient Portal offered by [facility] by registering at the following website: [email or web address]. By joining [employer]’s FollowMyHealth portal, you will also be able to view your health information using other applications (apps) compatible with our system.

## 2022-11-04 NOTE — ED PROVIDER NOTE - CLINICAL SUMMARY MEDICAL DECISION MAKING FREE TEXT BOX
RLQ / R flank pain x past 1d. afebrile, min hematuria on outpt UA. check labs, CT, UA, US, IVF, pain control, reeval

## 2022-11-04 NOTE — ED PROVIDER NOTE - CHPI ED SYMPTOMS NEG
no abdominal distension/no blood in stool/no dysuria/no fever/no vomiting/no burning urination/no chills

## 2022-12-29 NOTE — DISCHARGE NOTE NURSING/CASE MANAGEMENT/SOCIAL WORK - NSDCPETBCESMAN_GEN_ALL_CORE
[facility] Note     Patient Name: Moi Elias  Encounter Date: 12/29/2022     Have you been cared for by a [place] [address] Dermatologist in the last 3 years and, if so, which description applies to you? Yes  I have been here within the last 3 years, and my medical history has NOT changed since that time  I am FEMALE/of child-bearing potential     REVIEW OF SYSTEMS:  Have you recently had or currently have any of the following? · No changes in my recent health  PAST MEDICAL HISTORY:  Have you personally ever had or currently have any of the following? If "YES," then please provide more detail  · No changes in my medical history  FAMILY HISTORY:  Any "first degree relatives" (parent, brother, sister, or child) with the following? • No changes in my family's known health  PATIENT EXPERIENCE:    • Do you want the Dermatologist to perform a COMPLETE skin exam today including a clinical examination under the "bra and underwear" areas? NO, breasts, groin and buttocks not examined  • If necessary, do we have your permission to call and leave a detailed message on your Preferred Phone number that includes your specific medical information? Yes      Allergies   Allergen Reactions   • Sulfa Antibiotics Hives      Current Outpatient Medications:   •  ketoconazole (NIZORAL) 2 % shampoo, Apply topically 2-3 times a week to scalp Lather into scalp; leave on for 5 minutes and then rinse off completely  , Disp: 120 mL, Rfl: 11          • Whom besides the patient is providing clinical information about today's encounter?   o NO ADDITIONAL HISTORIAN (patient alone provided history)    Physical Exam and Assessment/Plan by Diagnosis:    MELANOCYTIC NEVI ("Moles")    Physical Exam:  • Anatomic Location Affected:   Mostly on sun-exposed areas of the trunk and extremities  • Morphological Description:  Scattered, 1-4mm round to ovoid, symmetrical-appearing, even bordered, skin colored to dark Katie Mahad macules/papules, mostly in sun-exposed areas  • Pertinent Positives:  • Pertinent Negatives: Additional History of Present Condition:      Assessment and Plan:  Based on a thorough discussion of this condition and the management approach to it (including a comprehensive discussion of the known risks, side effects and potential benefits of treatment), the patient (family) agrees to implement the following specific plan:  • When outside we recommend using a wide brim hat, sunglasses, long sleeve and pants, sunscreen with SPF 80+ with reapplication every 2 hours, or SPF specific clothing   • Benign, reassured  • Annual skin check     Melanocytic Nevi  Melanocytic nevi ("moles") are tan or brown, raised or flat areas of the skin which have an increased number of melanocytes  Melanocytes are the cells in our body which make pigment and account for skin color  Some moles are present at birth (I e , "congenital nevi"), while others come up later in life (i e , "acquired nevi")  The sun can stimulate the body to make more moles  Sunburns are not the only thing that triggers more moles  Chronic sun exposure can do it too  Clinically distinguishing a healthy mole from melanoma may be difficult, even for experienced dermatologists  The "ABCDE's" of moles have been suggested as a means of helping to alert a person to a suspicious mole and the possible increased risk of melanoma  The suggestions for raising alert are as follows:    Asymmetry: Healthy moles tend to be symmetric, while melanomas are often asymmetric  Asymmetry means if you draw a line through the mole, the two halves do not match in color, size, shape, or surface texture  Asymmetry can be a result of rapid enlargement of a mole, the development of a raised area on a previously flat lesion, scaling, ulceration, bleeding or scabbing within the mole    Any mole that starts to demonstrate "asymmetry" should be examined promptly by a board certified dermatologist      Maldonado Counts: Healthy moles tend to have discrete, even borders  The border of a melanoma often blends into the normal skin and does not sharply delineate the mole from normal skin  Any mole that starts to demonstrate "uneven borders" should be examined promptly by a board certified dermatologist      Color: Healthy moles tend to be one color throughout  Melanomas tend to be made up of different colors ranging from dark black, blue, white, or red  Any mole that demonstrates a color change should be examined promptly by a board certified dermatologist      Diameter: Healthy moles tend to be smaller than 0 6 cm in size; an exception are "congenital nevi" that can be larger  Melanomas tend to grow and can often be greater than 0 6 cm (1/4 of an inch, or the size of a pencil eraser)  This is only a guideline, and many normal moles may be larger than 0 6 cm without being unhealthy  Any mole that starts to change in size (small to bigger or bigger to smaller) should be examined promptly by a board certified dermatologist      Evolving: Healthy moles tend to "stay the same "  Melanomas may often show signs of change or evolution such as a change in size, shape, color, or elevation  Any mole that starts to itch, bleed, crust, burn, hurt, or ulcerate or demonstrate a change or evolution should be examined promptly by a board certified dermatologist         LENTIGO    Physical Exam:  • Anatomic Location Affected:  Trunk and bilateral upper extremities   • Morphological Description:  Light brown macules  • Pertinent Positives:  • Pertinent Negatives:     Additional History of Present Condition:      Assessment and Plan:  Based on a thorough discussion of this condition and the management approach to it (including a comprehensive discussion of the known risks, side effects and potential benefits of treatment), the patient (family) agrees to implement the following specific plan:  • When outside we recommend using a wide brim hat, sunglasses, long sleeve and pants, sunscreen with SPF 58+ with reapplication every 2 hours, or SPF specific clothing       What is a lentigo? A lentigo is a pigmented flat or slightly raised lesion with a clearly defined edge  Unlike an ephelis (freckle), it does not fade in the winter months  There are several kinds of lentigo  The name lentigo originally referred to its appearance resembling a small lentil  The plural of lentigo is lentigines, although “lentigos” is also in common use  Who gets lentigines? Lentigines can affect males and females of all ages and races  Solar lentigines are especially prevalent in fair skinned adults  Lentigines associated with syndromes are present at birth or arise during childhood  What causes lentigines? Common forms of lentigo are due to exposure to ultraviolet radiation:  • Sun damage including sunburn   • Indoor tanning   • Phototherapy, especially photochemotherapy (PUVA)    Ionizing radiation, eg radiation therapy, can also cause lentigines  Several familial syndromes associated with widespread lentigines originate from mutations in Aleksey-MAP kinase, mTOR signaling and PTEN pathways  What is the treatment for lentigines? Most lentigines are left alone  Attempts to lighten them may not be successful  The following approaches are used:  • SPF 50+ broad-spectrum sunscreen   • Hydroquinone bleaching cream   • Alpha hydroxy acids   • Vitamin C   • Retinoids   • Azelaic acid   • Chemical peels  Individual lesions can be permanently removed using:  • Cryotherapy   • Intense pulsed light   • Pigment lasers    How can lentigines be prevented? Lentigines associated with exposure ultraviolet radiation can be prevented by very careful sun protection  Clothing is more successful at preventing new lentigines than are sunscreens  What is the outlook for lentigines? Lentigines usually persist  They may increase in number with age and sun exposure  Some in sun-protected sites may fade and disappear  MERCADO ANGIOMAS    Physical Exam:  • Anatomic Location Affected:  trunk  • Morphological Description:  Scattered cherry red, 1-4 mm papules  • Pertinent Positives:  • Pertinent Negatives: Additional History of Present Condition:      Assessment and Plan:  Based on a thorough discussion of this condition and the management approach to it (including a comprehensive discussion of the known risks, side effects and potential benefits of treatment), the patient (family) agrees to implement the following specific plan:  • Monitor for changes  • Benign, reassured  •     Assessment and Plan:    Cherry angioma, also known as Monika Combs spots, are benign vascular skin lesions  A "cherry angioma" is a firm red, blue or purple papule, 0 1-1 cm in diameter  When thrombosed, they can appear black in colour until evaluated with a dermatoscope when the red or purple colour is more easily seen  Cherry angioma may develop on any part of the body but most often appear on the scalp, face, lips and trunk  An angioma is due to proliferating endothelial cells; these are the cells that line the inside of a blood vessel  Angiomas can arise in early life or later in life; the most common type of angioma is a cherry angioma  Cherry angiomas are very common in males and females of any age or race  They are more noticeable in white skin than in skin of colour  They markedly increase in number from about the age of 36  There may be a family history of similar lesions  Eruptive cherry angiomas have been rarely reported to be associated with internal malignancy  The cause of angiomas is unknown  Genetic analysis of cherry angiomas has shown that they frequently carry specific somatic missense mutations in the GNAQ and GNA11 (Q209H) genes, which are involved in other vascular and melanocytic proliferations        SEBORRHEIC KERATOSIS; NON-INFLAMED    Physical Exam:  • Anatomic Location Affected:  trunk  • Morphological Description:  Flat and raised, waxy, smooth to warty textured, yellow to brownish-grey to dark brown to blackish, discrete, "stuck-on" appearing papules  • Pertinent Positives:  • Pertinent Negatives: Additional History of Present Condition:      Assessment and Plan:  Based on a thorough discussion of this condition and the management approach to it (including a comprehensive discussion of the known risks, side effects and potential benefits of treatment), the patient (family) agrees to implement the following specific plan:  • Monitor for changes  • Benign, reassured  •     Seborrheic Keratosis  A seborrheic keratosis is a harmless warty spot that appears during adult life as a common sign of skin aging  Seborrheic keratoses can arise on any area of skin, covered or uncovered, with the usual exception of the palms and soles  They do not arise from mucous membranes  Seborrheic keratoses can have highly variable appearance  Seborrheic keratoses are extremely common  It has been estimated that over 90% of adults over the age of 61 years have one or more of them  They occur in males and females of all races, typically beginning to erupt in the 35s or 45s  They are uncommon under the age of 21 years  The precise cause of seborrhoeic keratoses is not known  Seborrhoeic keratoses are considered degenerative in nature  As time goes by, seborrheic keratoses tend to become more numerous  Some people inherit a tendency to develop a very large number of them; some people may have hundreds of them  There is no easy way to remove multiple lesions on a single occasion  Unless a specific lesion is "inflamed" and is causing pain or stinging/burning or is bleeding, most insurance companies do not authorize treatment      XEROSIS ("DRY SKIN")    Physical Exam:  • Anatomic Location Affected:  diffuse  • Morphological Description:  xerosis  • Pertinent Positives:  • Pertinent Negatives: Additional History of Present Condition:      Assessment and Plan:  Based on a thorough discussion of this condition and the management approach to it (including a comprehensive discussion of the known risks, side effects and potential benefits of treatment), the patient (family) agrees to implement the following specific plan:  • Use moisturizer like Eucerin,Cerave or Aveeno Cream 3 times a day for the dry skin   •   •    •     Dry skin refers to skin that feels dry to touch  Dry skin has a dull surface with a rough, scaly quality  The skin is less pliable and cracked  When dryness is severe, the skin may become inflamed and fissured  Although any body site can be dry, dry skin tends to affect the shins more than any other site  Dry skin is lacking moisture in the outer horny cell layer (stratum corneum) and this results in cracks in the skin surface  Dry skin is also called xerosis, xeroderma or asteatosis (lack of fat)  It can affect males and females of all ages  There is some racial variability in water and lipid content of the skin  • Dry skin that starts in early childhood may be one of about 20 types of ichthyosis (fish-scale skin)  There is often a family history of dry skin  • Dry skin is commonly seen in people with atopic dermatitis  • Nearly everyone > 60 years has dry skin  Dry skin that begins later may be seen in people with certain diseases and conditions  • Postmenopausal women  • Hypothyroidism  • Chronic renal disease   • Malnutrition and weight loss   • Subclinical dermatitis   • Treatment with certain drugs such as oral retinoids, diuretics and epidermal growth factor receptor inhibitors      What is the treatment for dry skin? The mainstay of treatment of dry skin and ichthyosis is moisturisers/emollients   They should be applied liberally and often enough to:  • Reduce itch   • Improve the barrier function   • Prevent entry of irritants, bacteria   • Reduce transepidermal water loss  How can dry skin be prevented? Eliminate aggravating factors:  • Reduce the frequency of bathing  • A humidifier in winter and air conditioner in summer   • Compare having a short shower with a prolonged soak in a bath  • Use lukewarm, not hot, water  • Replace standard soap with a substitute such as a synthetic detergent cleanser, water-miscible emollient, bath oil, anti-pruritic tar oil, colloidal oatmeal etc    • Apply an emollient liberally and often, particularly shortly after bathing, and when itchy  The drier the skin, the thicker this should be, especially on the hands  What is the outlook for dry skin? A tendency to dry skin may persist life-long, or it may improve once contributing factors are controlled  MILIUM     Physical Exam:  • Anatomic Location Affected:  Right neck  • Morphological Description:  White papules  • Pertinent Positives:  • Pertinent Negatives: Additional History of Present Condition:  Found on exam    Assessment and Plan:  Based on a thorough discussion of this condition and the management approach to it (including a comprehensive discussion of the known risks, side effects and potential benefits of treatment), the patient (family) agrees to implement the following specific plan:  • Benign, assurance provided    Assessment and Plan  A milium is a small cyst containing keratin (the skin protein); they are usually multiple and are then known as milia  These harmless cysts present as tiny pearly-white bumps just under the surface of the skin  Milia are common in all ages and both sexes  They most often arise on the face and are particularly prominent on the eyelids and cheeks, but they may occur elsewhere  There are various kinds of milia    •  milia: Affect 40-50% of  babies, few to numerous lesions, often seen on the nose, but may also arise inside the mouth on the mucosa (Mansi pearls) or palate (Michael nodules) or more widely on the scalp, face and upper trunk, Heal spontaneously within a few weeks of birth  • Primary milia in children and adults: found around eyelids, cheeks, forehead and genitalia, in young children, a row of milia may appear along the nasal crease, may clear in a few weeks or persist for months or longer  • Juvenile milia: associated with Rombo syndrome, basal cell naevus syndrome, Gdocn-Gzwnu-Yszfdfvv syndrome, pachyonychia congenita, Wayne syndrome and other genetic disorders, may be congenital (present at birth) or appear later in life  • Milia en plaque: multiple milia appear on within an inflamed plaque up to several centimeters in diameter, usually found on an eyelid, behind the ear, on a cheek or jaw  3  Affect children and adults, especially middle-aged women  4  Sometimes associated with another skin disease including pseudoxanthoma elasticum, discoid lupus erythematosus, lichen planus  • Multiple eruptive milia: crops of numerous milia appear over a few weeks to months, lesions may be asymptomatic or itchy, most often affect the face, upper arms and upper trunk  • Traumatic milia: occur at the site of injury as skin heals, arise from eccrine sweat ducts, examples include thermal burns, dermabrasion, blistering rashes such as bullous pemphigoid, often seen on the back of hands and fingers in porphyria cutanea tarda, a milia-like calcified nodule may develop after  heel stick blood test    • Milia associated with drugs: may rarely follow the use of topical medication, such as phenols, hydroquinone, 5-fluorouracil cream, and a corticosteroid  Milia have a characteristic appearance  However, on occasion, a skin biopsy may be performed  This shows a small epidermoid cyst coming from a vellus hair follicle  Milia should be distinguished from other types of cyst, comedones, xanthelasma and syringomas   Colloid milia are salgado coloured bumps on cheeks and temples associated with excessive exposure to sunlight  They should also be distinguished from milia-like cysts noted on dermoscopy in seborrhoeic keratoses, papillomatous moles and some basal cell carcinomas  Milia do not need to be treated unless they are a cause for concern for the patient  They often clear up by themselves within a few months  Where possible, further trauma should be minimised to reduce the development of new lesions  • The lesion may be de-roofed using a sterile needle or blade and the contents squeezed or pricked out  • They may be destroyed using diathermy and curettage, or cryotherapy  • For widespread lesions, topical retinoids may be helpful  • Chemical peels, dermabrasion and laser ablation have been reported to be effective when used for very extensive milia  • Milia en plaque may improve with minocycline (a tetracycline antibiotic)  ACROCHORDON ("SKIN TAG")    Physical Exam:  • Anatomic Location Affected:  Mid chest  • Morphological Description:  Verrucous papule  • Pertinent Positives:  • Pertinent Negatives: Additional History of Present Condition:  Patient is interested in removal    Assessment and Plan:  Based on a thorough discussion of this condition and the management approach to it (including a comprehensive discussion of the known risks, side effects and potential benefits of treatment), the patient (family) agrees to implement the following specific plan:  • Patient will be scheduled for a shave biopsy removal      Skin tags are common, soft, harmless skin lesions that are also called, in the appropriate settings, papillomas, fibroepithelial polyps, and soft fibromas  They are made up of loosely arranged collagen fibers and blood vessels surrounded by a thickened or thinned-out epidermis  Skin tags tend to develop in both men and women as we grow older  They are usually found on the skin folds (neck, armpits, groin)  It is not known what specifically causes skin tags    Certain factors, though, do appear to play a role:  • Chaffing and irritation from skin rubbing together  • High levels of growth factors (as seen, for example, in pregnancy or in acromegaly/gigantism)  • Insulin resistance  • Human papillomavirus (wart virus)    We discussed that most skin tags do not need to be treated unless they are specifically causing the patient physical distress or limitation or pose a risk for a larger problem such as an infection that forms secondary to excoriation or chronic irritation      We had a thorough discussion of treatment options and specific risks (including that any procedural treatment may not be covered by insurance and would then be the patient's responsibility) and benefits/alternatives including but not limited to the following:  • Cryotherapy (freezing)  • Shave removal  • Surgical excision (snip excision with scissors)  • Electrosurgery  • Ligation (we do not do this procedure and counseled against it due to risk of tissue necrosis and infection)            Scribe Attestation    I,:  Pati Kee am acting as a scribe while in the presence of the attending physician :       I,:  Alexia Pinto MD personally performed the services described in this documentation    as scribed in my presence : If you are a smoker, it is important for your health to stop smoking. Please be aware that second hand smoke is also harmful.

## 2023-04-04 NOTE — PATIENT PROFILE ADULT - NSPROGENSOURCEINFO_GEN_A_NUR
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 (H)  Stable   - CBC with platelets; Future  - CBC with platelets    History of sepsis  All labs greatly improved   - CBC with platelets; Future  - Basic metabolic panel  (Ca, Cl, CO2, Creat, Gluc, K, Na, BUN); Future  - Basic metabolic panel  (Ca, Cl, CO2, Creat, Gluc, K, Na, BUN)  - CBC with platelets    Chronic, continuous use of opioids  Pain management switched to fentanyl patch     Benign essential hypertension  Controlled   - CBC with platelets; Future  - Basic metabolic panel  (Ca, Cl, CO2, Creat, Gluc, K, Na, BUN); Future  - Basic metabolic panel  (Ca, Cl, CO2, Creat, Gluc, K, Na, BUN)  - CBC with platelets         MED REC REQUIRED  Post Medication Reconciliation Status: discharge medications reconciled, continue medications without change  BMI:   Estimated body mass index is 26.92 kg/m  as calculated from the following:    Height as of 3/20/23: 1.803 m (5' 11\").    Weight as of this encounter: 87.5 kg (193 lb).       3 months sooner if worsening or not improving     Brionna White MD  [facility] LYNDSAY Andino is a 86 year old, presenting for the following health issues:  Hospital F/U      hospitals       Hospital Follow-up Visit:    Hospital/Nursing Home/IP Rehab Facility: [facility]  Date of Admission: 3/9/23  Date of Discharge: 3/31/23  Reason(s) for Admission:   Septic shock, resolved  Pancolitis likely due to norovirus  Acute infectious diarrhea  Polymicrobial bacteremia - etiology unclear, likely related to colitis.  Spell on 3/28/23    Was your hospitalization related to COVID-19? No   Problems taking medications regularly:  None  Medication changes since discharge: None  Problems adhering to non-medication therapy:  " None    Summary of hospitalization:  [facility] discharge summary reviewed  Diagnostic Tests/Treatments reviewed.  Follow up needed: labs  Other Healthcare Providers Involved in Patient s Care:         Homecare  Update since discharge: improved.     Plan of care communicated with patient and family         In hospital due to sepsis. He is very fatigued and has low energy at this point. He will be getting physical therapy ot at [facility]. We discussed that it will likely take a few weeks for him to be back to baseline as he was quite ill and it takes a while to recover       Review of Systems   Constitutional, HEENT, cardiovascular, pulmonary, gi and gu systems are negative, except as otherwise noted.      Objective    /62   Pulse 67   Temp 97.7  F (36.5  C) (Tympanic)   Wt 87.5 kg (193 lb)   SpO2 94%   BMI 26.92 kg/m    Body mass index is 26.92 kg/m .  Physical Exam   GENERAL: alert, pale, frail, elderly and fatigued  NECK: no adenopathy, no asymmetry, masses, or scars and thyroid normal to palpation  RESP: lungs clear to auscultation - no rales, rhonchi or wheezes  CV: regular rate and rhythm, normal S1 S2, no S3 or S4, no murmur, click or rub, no peripheral edema and peripheral pulses strong    Results for orders placed or performed in visit on 04/04/23   Basic metabolic panel  (Ca, Cl, CO2, Creat, Gluc, K, Na, BUN)     Status: Normal   Result Value Ref Range    Sodium 139 136 - 145 mmol/L    Potassium 4.0 3.4 - 5.3 mmol/L    Chloride 101 98 - 107 mmol/L    Carbon Dioxide (CO2) 28 22 - 29 mmol/L    Anion Gap 10 7 - 15 mmol/L    Urea Nitrogen 8.9 8.0 - 23.0 mg/dL    Creatinine 0.71 0.67 - 1.17 mg/dL    Calcium 9.4 8.8 - 10.2 mg/dL    Glucose 98 70 - 99 mg/dL    GFR Estimate 90 >60 mL/min/1.73m2   CBC with platelets     Status: Abnormal   Result Value Ref Range    WBC Count 6.1 4.0 - 11.0 10e3/uL    RBC Count 3.76 (L) 4.40 - 5.90 10e6/uL    Hemoglobin 11.2 (L) 13.3 - 17.7 g/dL    Hematocrit  37.2 (L) 40.0 - 53.0 %    MCV 99 78 - 100 fL    MCH 29.8 26.5 - 33.0 pg    MCHC 30.1 (L) 31.5 - 36.5 g/dL    RDW 15.8 (H) 10.0 - 15.0 %    Platelet Count 287 150 - 450 10e3/uL       Brionna White M.D.               patient

## 2023-05-05 LAB — FERRITIN SERPL-MCNC: 56 NG/ML

## 2023-08-14 ENCOUNTER — OUTPATIENT (OUTPATIENT)
Dept: OUTPATIENT SERVICES | Facility: HOSPITAL | Age: 38
LOS: 1 days | End: 2023-08-14
Payer: COMMERCIAL

## 2023-08-14 ENCOUNTER — LABORATORY RESULT (OUTPATIENT)
Age: 38
End: 2023-08-14

## 2023-08-14 ENCOUNTER — APPOINTMENT (OUTPATIENT)
Dept: HEMATOLOGY ONCOLOGY | Facility: CLINIC | Age: 38
End: 2023-08-14
Payer: COMMERCIAL

## 2023-08-14 VITALS
BODY MASS INDEX: 23.73 KG/M2 | SYSTOLIC BLOOD PRESSURE: 112 MMHG | HEIGHT: 64 IN | HEART RATE: 74 BPM | WEIGHT: 139 LBS | RESPIRATION RATE: 16 BRPM | TEMPERATURE: 96.8 F | OXYGEN SATURATION: 98 % | DIASTOLIC BLOOD PRESSURE: 60 MMHG

## 2023-08-14 DIAGNOSIS — Z90.49 ACQUIRED ABSENCE OF OTHER SPECIFIED PARTS OF DIGESTIVE TRACT: Chronic | ICD-10-CM

## 2023-08-14 DIAGNOSIS — D68.0 VON WILLEBRAND'S DISEASE: ICD-10-CM

## 2023-08-14 DIAGNOSIS — Z98.890 OTHER SPECIFIED POSTPROCEDURAL STATES: Chronic | ICD-10-CM

## 2023-08-14 DIAGNOSIS — D50.9 IRON DEFICIENCY ANEMIA, UNSPECIFIED: ICD-10-CM

## 2023-08-14 LAB
HCT VFR BLD CALC: 38.5 %
HGB BLD-MCNC: 12.8 G/DL
MCHC RBC-ENTMCNC: 29.4 PG
MCHC RBC-ENTMCNC: 33.2 G/DL
MCV RBC AUTO: 88.5 FL
PLATELET # BLD AUTO: 182 K/UL
PMV BLD: 9.1 FL
RBC # BLD: 4.35 M/UL
RBC # FLD: 14 %
WBC # FLD AUTO: 7.77 K/UL

## 2023-08-14 PROCEDURE — 99213 OFFICE O/P EST LOW 20 MIN: CPT

## 2023-08-14 PROCEDURE — 85246 CLOT FACTOR VIII VW ANTIGEN: CPT

## 2023-08-14 PROCEDURE — 82728 ASSAY OF FERRITIN: CPT

## 2023-08-14 PROCEDURE — 85247 CLOT FACTOR VIII MULTIMETRIC: CPT

## 2023-08-14 PROCEDURE — 85027 COMPLETE CBC AUTOMATED: CPT

## 2023-08-14 PROCEDURE — 85245 CLOT FACTOR VIII VW RISTOCTN: CPT

## 2023-08-15 DIAGNOSIS — D50.9 IRON DEFICIENCY ANEMIA, UNSPECIFIED: ICD-10-CM

## 2023-08-15 LAB
FERRITIN SERPL-MCNC: 40 NG/ML
VWF AG PPP IA-ACNC: 160 %

## 2023-08-15 NOTE — ASSESSMENT
[FreeTextEntry1] : 36 year old female with VWD , s/p IVF , 11 weeks gestation , on aspirin .  plan : ferritin ,, von willebrand assay , cbc .

## 2023-08-15 NOTE — HISTORY OF PRESENT ILLNESS
[de-identified] : 09/25/2020 Today's encounter was carried out via telehealth at her request and in compliance with [facility] guidelines given the extreme circumstances surrounding Covid 19 out break . 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 Her menstrual bleeding is well controlled with OCPs but continues to report excessive bleeding with dental work ( improved slightly with tranexamic acid ) . Denies any other bleeding symptoms .  She is now scheduled for egg harvest in early October .   9/9/2022 Patient returns one month after spontaneous fetal loss at 9 1/2 weeks , pathology was inconclusive . Since then she has persistent bleeding , heavy and period like at times . She received one unit rbc and  Humate-P 3 weeks ago for Hgb < 8 . She had  biopsy today for mild endometrial thickening , she resumed OCPs ,  is using tranexemic acid and intranasal DDAVP . She notes some reduction in the bleeding . she has a also received venofer X 4 in last 3 weeks . She denies weakness, dizziness or SOB .   8/14/2023 Patient returns for follow up ,she is 11 weeks pregnant after successful IVF . she is on aspirin and denies any abnormal bleeding . she takes prenatal vitamins , she denies fatigue or SOB .

## 2023-09-08 LAB — VWF MULTIMERS PPP IA-ACNC: NORMAL

## 2023-11-17 ENCOUNTER — OUTPATIENT (OUTPATIENT)
Dept: INPATIENT UNIT | Facility: HOSPITAL | Age: 38
LOS: 1 days | Discharge: HOME | End: 2023-11-17
Payer: MEDICAID

## 2023-11-17 VITALS — HEART RATE: 75 BPM | SYSTOLIC BLOOD PRESSURE: 117 MMHG | DIASTOLIC BLOOD PRESSURE: 79 MMHG

## 2023-11-17 VITALS
DIASTOLIC BLOOD PRESSURE: 79 MMHG | RESPIRATION RATE: 20 BRPM | SYSTOLIC BLOOD PRESSURE: 117 MMHG | HEART RATE: 75 BPM | TEMPERATURE: 98 F

## 2023-11-17 DIAGNOSIS — O26.899 OTHER SPECIFIED PREGNANCY RELATED CONDITIONS, UNSPECIFIED TRIMESTER: ICD-10-CM

## 2023-11-17 DIAGNOSIS — Z98.890 OTHER SPECIFIED POSTPROCEDURAL STATES: Chronic | ICD-10-CM

## 2023-11-17 DIAGNOSIS — Z90.49 ACQUIRED ABSENCE OF OTHER SPECIFIED PARTS OF DIGESTIVE TRACT: Chronic | ICD-10-CM

## 2023-11-17 LAB
APPEARANCE UR: CLEAR — SIGNIFICANT CHANGE UP
APPEARANCE UR: CLEAR — SIGNIFICANT CHANGE UP
BILIRUB UR-MCNC: NEGATIVE — SIGNIFICANT CHANGE UP
BILIRUB UR-MCNC: NEGATIVE — SIGNIFICANT CHANGE UP
COLOR SPEC: YELLOW — SIGNIFICANT CHANGE UP
COLOR SPEC: YELLOW — SIGNIFICANT CHANGE UP
DIFF PNL FLD: NEGATIVE — SIGNIFICANT CHANGE UP
DIFF PNL FLD: NEGATIVE — SIGNIFICANT CHANGE UP
GLUCOSE UR QL: NEGATIVE MG/DL — SIGNIFICANT CHANGE UP
GLUCOSE UR QL: NEGATIVE MG/DL — SIGNIFICANT CHANGE UP
KETONES UR-MCNC: NEGATIVE MG/DL — SIGNIFICANT CHANGE UP
KETONES UR-MCNC: NEGATIVE MG/DL — SIGNIFICANT CHANGE UP
LEUKOCYTE ESTERASE UR-ACNC: NEGATIVE — SIGNIFICANT CHANGE UP
LEUKOCYTE ESTERASE UR-ACNC: NEGATIVE — SIGNIFICANT CHANGE UP
NITRITE UR-MCNC: NEGATIVE — SIGNIFICANT CHANGE UP
NITRITE UR-MCNC: NEGATIVE — SIGNIFICANT CHANGE UP
PH UR: 7 — SIGNIFICANT CHANGE UP (ref 5–8)
PH UR: 7 — SIGNIFICANT CHANGE UP (ref 5–8)
PROT UR-MCNC: NEGATIVE MG/DL — SIGNIFICANT CHANGE UP
PROT UR-MCNC: NEGATIVE MG/DL — SIGNIFICANT CHANGE UP
SP GR SPEC: 1.01 — SIGNIFICANT CHANGE UP (ref 1–1.03)
SP GR SPEC: 1.01 — SIGNIFICANT CHANGE UP (ref 1–1.03)
UROBILINOGEN FLD QL: 0.2 MG/DL — SIGNIFICANT CHANGE UP (ref 0.2–1)
UROBILINOGEN FLD QL: 0.2 MG/DL — SIGNIFICANT CHANGE UP (ref 0.2–1)

## 2023-11-17 PROCEDURE — 99221 1ST HOSP IP/OBS SF/LOW 40: CPT

## 2023-11-17 PROCEDURE — 99214 OFFICE O/P EST MOD 30 MIN: CPT

## 2023-11-17 PROCEDURE — 87086 URINE CULTURE/COLONY COUNT: CPT

## 2023-11-17 PROCEDURE — 59025 FETAL NON-STRESS TEST: CPT

## 2023-11-17 PROCEDURE — 81003 URINALYSIS AUTO W/O SCOPE: CPT

## 2023-11-17 NOTE — OB PROVIDER TRIAGE NOTE - NSICDXPASTMEDICALHX_GEN_ALL_CORE_FT
[FreeTextEntry3] : \par Saw and examined patient and agree with plan with modifications.\par \par Areli Landis MD\par [facility]\par Pediatric Orthopedic Surgery\par  PAST MEDICAL HISTORY:  2019 novel coronavirus disease (COVID-19) 3/2021    Asthma no attacks- mild    AMY (iron deficiency anemia)     Type 2A von Willebrand disease

## 2023-11-17 NOTE — OB PROVIDER TRIAGE NOTE - ADDITIONAL INSTRUCTIONS
-Attempt to urinate every 2 hours  -if you are unable to urinate within 4-5 hours, you may try intermittent self-catheterization (can perform this 5-6 times per day)  -if you are unable to empty your bladder, please re-present to the closest emergency room.   -follow up with your OBGYN next week  Catheterization technique:  For women – Assemble all equipment and wash the hands with soap and water. Clean intermittent catheterization can be performed in any comfortable position; however, many women find it easiest to stand with one foot on the toilet. Clean the vulva with soap and water. With the nondominant hand, spread the labia with the second and fourth finger, using the middle finger to locate the urethral opening, which is below the clitoris and above the vagina. Gently insert the catheter into the opening with the dominant hand. Guide the catheter toward the umbilicus (ie, belly button). Urine will begin to flow when the catheter has been inserted two to three inches. Advance the catheter another inch and hold it in place until the urine flow stops and the bladder is empty. Remove the catheter slowly to allow complete drainage of the bladder. -Attempt to urinate every 2 hours  -if you are unable to urinate within 2-3 hours, you may try intermittent self-catheterization (can perform this 5-6 times per day)  -if you are unable to empty your bladder, please re-present to the closest emergency room.   -follow up with your OBGYN next week    Catheterization technique:  For women – Assemble all equipment and wash the hands with soap and water. You may use a mirror to assist you. Clean intermittent catheterization can be performed in any comfortable position; however, many women find it easiest to stand with one foot on the toilet. Clean the vulva with soap and water. With the nondominant hand, spread the labia with the second and fourth finger, using the middle finger to locate the urethral opening, which is below the clitoris and above the vagina. Gently insert the catheter into the opening with the dominant hand. Guide the catheter toward the umbilicus (ie, belly button). Urine will begin to flow when the catheter has been inserted two to three inches. Advance the catheter another inch and hold it in place until the urine flow stops and the bladder is empty. Remove the catheter slowly to allow complete drainage of the bladder.

## 2023-11-17 NOTE — OB RN TRIAGE NOTE - NS_VISITREASON1_OBGYN_ALL_OB
Addended by: KATELYN GONZALEZ on: 5/6/2021 09:25 AM     Modules accepted: Orders    
Addended by: MIGUEL ANGEL GRIGSBY on: 5/6/2021 09:02 AM     Modules accepted: Orders    
Other

## 2023-11-17 NOTE — OB RN TRIAGE NOTE - FALL HARM RISK - FALL HARM RISK
Subjective:       Patient ID: Olivier Kohler is a 56 y.o. male.    Chief Complaint: Gall Bladder Problem      HPI:  56 year old male referred to the office with symptomatic gallstones. He has about one year of upper abdominal pain and nausea. Worse with meals. US shows multiple gallstones. He is feeling OK in the office today. Past surgical history of hernia repair with mesh - he thinks probably umbilical.     Past Medical History:   Diagnosis Date    GERD (gastroesophageal reflux disease)     ANU (obstructive sleep apnea)      Past Surgical History:   Procedure Laterality Date    ESOPHAGOGASTRODUODENOSCOPY N/A 11/19/2019    Procedure: EGD (ESOPHAGOGASTRODUODENOSCOPY);  Surgeon: Flip Breen MD;  Location: [facility];  Service: Endoscopy;  Laterality: N/A;     Review of patient's allergies indicates:  No Known Allergies  Medication List with Changes/Refills   Current Medications    ALBUTEROL (PROVENTIL HFA) 90 MCG/ACTUATION INHALER    Inhale 2 puffs into the lungs every 6 (six) hours as needed for Wheezing. Rescue    CETIRIZINE (ZYRTEC) 10 MG TABLET    Take 1 tablet (10 mg total) by mouth once daily. for 7 days    CYCLOBENZAPRINE (FLEXERIL) 5 MG TABLET    Take 5 mg by mouth every 8 (eight) hours.    HYDROCODONE-ACETAMINOPHEN (NORCO) 7.5-325 MG PER TABLET    Take 1 tablet by mouth every 4 to 6 hours as needed.    MELOXICAM (MOBIC) 15 MG TABLET    Take 15 mg by mouth once daily.    MULTIVITAMIN (THERAGRAN) TABLET    Take 1 tablet by mouth once daily.    ONDANSETRON (ZOFRAN-ODT) 4 MG TBDL    Take 4 mg by mouth every 8 (eight) hours as needed.    PULSE OXIMETER (PULSE OXIMETER) DEVICE    Use twice daily at 8 AM and 3 PM and record the value in MyChart as directed.     Family History   Problem Relation Age of Onset    No Known Problems Mother     No Known Problems Father      Social History     Socioeconomic History    Marital status:    Tobacco Use    Smoking status: Never    Smokeless tobacco: Never    Substance and Sexual Activity    Alcohol use: Not Currently    Drug use: Never    Sexual activity: Yes     Partners: Female   Social History Narrative    ** Merged History Encounter **              Review of Systems   Constitutional:  Negative for appetite change, chills, fever and unexpected weight change.   HENT:  Negative for hearing loss, rhinorrhea, sore throat and voice change.    Eyes:  Negative for photophobia and visual disturbance.   Respiratory:  Negative for cough, choking and shortness of breath.    Cardiovascular:  Negative for chest pain, palpitations and leg swelling.   Gastrointestinal:  Positive for abdominal pain and nausea. Negative for blood in stool, constipation, diarrhea and vomiting.   Endocrine: Negative for cold intolerance, heat intolerance, polydipsia and polyuria.   Musculoskeletal:  Negative for arthralgias, back pain, joint swelling and neck stiffness.   Skin:  Negative for color change, pallor and rash.   Neurological:  Negative for dizziness, seizures, syncope and headaches.   Hematological:  Negative for adenopathy. Does not bruise/bleed easily.   Psychiatric/Behavioral:  Negative for agitation, behavioral problems and confusion.      Objective:      Physical Exam  Constitutional:       General: He is awake. He is not in acute distress.     Appearance: Normal appearance. He is well-developed. He is morbidly obese. He is not toxic-appearing.   HENT:      Head: Normocephalic and atraumatic. No abrasion or laceration.      Right Ear: External ear normal.      Left Ear: External ear normal.      Nose: Nose normal.   Eyes:      Pupils: Pupils are equal, round, and reactive to light.   Neck:      Trachea: Trachea normal. No tracheal deviation.   Cardiovascular:      Rate and Rhythm: Normal rate and regular rhythm.   Pulmonary:      Effort: Pulmonary effort is normal. No tachypnea, accessory muscle usage or respiratory distress.   Abdominal:      General: There is no distension.       Palpations: Abdomen is soft.      Tenderness: There is no abdominal tenderness.   Musculoskeletal:      Cervical back: Neck supple. Normal range of motion.   Lymphadenopathy:      Cervical: No cervical adenopathy.   Skin:     General: Skin is warm.   Neurological:      Mental Status: He is alert and oriented to person, place, and time.      Coordination: Coordination normal.      Gait: Gait normal.   Psychiatric:         Speech: Speech normal.         Behavior: Behavior normal. Behavior is cooperative.       Assessment/Plan:   Olivier was seen today for gall bladder problem.    Diagnoses and all orders for this visit:    Chronic calculous cholecystitis  -     Case Request Operating Room: CHOLECYSTECTOMY, LAPAROSCOPIC  -     Comprehensive metabolic panel; Future  -     CBC auto differential; Future  -     EKG 12-lead; Future  -     X-Ray Chest PA And Lateral; Future    Other orders  -     Full code; Standing  -     Vital signs; Standing  -     Insert peripheral IV; Standing  -     Diet NPO; Standing  -     IP VTE LOW RISK PATIENT; Standing  -     Place sequential compression device; Standing  -     Pulse Oximetry Q4H; Standing  -     Place in Outpatient; Standing  -     ceFOXItin (MEFOXIN) 2 g in dextrose 5 % 50 mL IVPB  -     Full code  -     Insert peripheral IV      Plan for cholecystectomy Jan 25      I discussed the proposed procedures with the patient including risks, benefits, indications, alternatives and special concerns.  The patient appears to understand and agrees to go ahead with surgery.  I have made no promises, warranties or verbal agreements beyond what was discussed above.    No follow-ups on file.     No indicators present

## 2023-11-17 NOTE — OB PROVIDER TRIAGE NOTE - NSHPPHYSICALEXAM_GEN_ALL_CORE
Vital Signs Last 24 Hrs  T(C): 36.7 (11-17-23 @ 14:41), Max: 36.7 (11-17-23 @ 14:41)  T(F): 98.1 (11-17-23 @ 14:41), Max: 98.1 (11-17-23 @ 14:41)  HR: 75 (11-17-23 @ 14:41) (75 - 75)  BP: 117/79 (11-17-23 @ 14:41) (117/79 - 117/79)  RR: 20 (11-17-23 @ 14:41) (20 - 20)    Gen: NAD, sitting comfortably  Abd: Gravid, soft, NT, no palpable ctx  SVE: deferred  EFM: 140/mod/+accels  [place]: none  Sono: cephalic, MVP 5cm, gross fetal movement, posterior placenta

## 2023-11-17 NOTE — OB PROVIDER TRIAGE NOTE - HISTORY OF PRESENT ILLNESS
37 yo  @24w6d, ROSA 3/2/24 by 5-day frozen embryo transfer, presents for inability to urinate.  39 yo  @24w6d, ROSA 3/2/24 by 5-day frozen embryo transfer, presents for inability to urinate. Pt reports she has been unable to urinate since waking up this morning. Reports increasing pelvic pressure and abdominal pain. Following straight catheterization in triage, pt reports complete relief of symptoms. Denies dysuria, urgency or frequency. Pt reports a similar episode at 11 weeks gestation where she was unable to urinate overnight, however was able to urinate in the AM. Ucx from that time negative per patient. She denies fever, chills, N/V, diarrhea, constipation. Pt reports 3 day lower leg swelling, R>L, no pain, erythema or edema. Pt reports she had duplex US of LE 2 days ago which was negative for DVT. PT reports taking baby ASA daily. Pt following with OBGYN on [address] Dr. Barrios    Pregnancy complicated by:  -Von willebrand Type 2A, follows with hematology  -H/o open appendectomy for ruptured appendicitis   -h/o open left salpingectomy and right tubal ligation (unable to remove tube 2/2 to severe adhesive disease) for treatment of tubal factor infertility.  -IVF pregnancy, tubal factor

## 2023-11-17 NOTE — OB PROVIDER TRIAGE NOTE - NSOBPROVIDERNOTE_OBGYN_ALL_OB_FT
A/P: 37 yo  @24w6d, GBS unknown, IVF pregnancy, h/o open appendectomy, with urinary retention, clinically and hemodynamically stable  -f/u UA, Ucx  -Patient amenable to active trial of void. Bladder backfilled through dc catheter with 400cc urine, patient urinated 300cc successfully within 30 minutes.  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   -Pt instructed to return to emergency room if unable to urinate  -f/u with PMD  -discharge in stable condition

## 2023-11-18 LAB
CULTURE RESULTS: NO GROWTH — SIGNIFICANT CHANGE UP
CULTURE RESULTS: NO GROWTH — SIGNIFICANT CHANGE UP
SPECIMEN SOURCE: SIGNIFICANT CHANGE UP
SPECIMEN SOURCE: SIGNIFICANT CHANGE UP

## 2023-11-20 DIAGNOSIS — O99.112 OTHER DISEASES OF THE BLOOD AND BLOOD-FORMING ORGANS AND CERTAIN DISORDERS INVOLVING THE IMMUNE MECHANISM COMPLICATING PREGNANCY, SECOND TRIMESTER: ICD-10-CM

## 2023-11-20 DIAGNOSIS — J45.909 UNSPECIFIED ASTHMA, UNCOMPLICATED: ICD-10-CM

## 2023-11-20 DIAGNOSIS — O99.012 ANEMIA COMPLICATING PREGNANCY, SECOND TRIMESTER: ICD-10-CM

## 2023-11-20 DIAGNOSIS — O09.02 SUPERVISION OF PREGNANCY WITH HISTORY OF INFERTILITY, SECOND TRIMESTER: ICD-10-CM

## 2023-11-20 DIAGNOSIS — O99.512 DISEASES OF THE RESPIRATORY SYSTEM COMPLICATING PREGNANCY, SECOND TRIMESTER: ICD-10-CM

## 2023-11-20 DIAGNOSIS — R33.9 RETENTION OF URINE, UNSPECIFIED: ICD-10-CM

## 2023-11-20 DIAGNOSIS — R10.2 PELVIC AND PERINEAL PAIN: ICD-10-CM

## 2023-11-20 DIAGNOSIS — D50.9 IRON DEFICIENCY ANEMIA, UNSPECIFIED: ICD-10-CM

## 2023-11-20 DIAGNOSIS — Z3A.24 24 WEEKS GESTATION OF PREGNANCY: ICD-10-CM

## 2023-11-20 DIAGNOSIS — O09.292 SUPERVISION OF PREGNANCY WITH OTHER POOR REPRODUCTIVE OR OBSTETRIC HISTORY, SECOND TRIMESTER: ICD-10-CM

## 2023-11-20 DIAGNOSIS — Z86.16 PERSONAL HISTORY OF COVID-19: ICD-10-CM

## 2023-11-20 DIAGNOSIS — O09.812 SUPERVISION OF PREGNANCY RESULTING FROM ASSISTED REPRODUCTIVE TECHNOLOGY, SECOND TRIMESTER: ICD-10-CM

## 2023-11-20 DIAGNOSIS — M79.89 OTHER SPECIFIED SOFT TISSUE DISORDERS: ICD-10-CM

## 2023-11-20 DIAGNOSIS — O26.892 OTHER SPECIFIED PREGNANCY RELATED CONDITIONS, SECOND TRIMESTER: ICD-10-CM

## 2023-11-20 DIAGNOSIS — D68.020 VON WILLEBRAND DISEASE, TYPE 2A: ICD-10-CM

## 2023-11-20 DIAGNOSIS — O09.522 SUPERVISION OF ELDERLY MULTIGRAVIDA, SECOND TRIMESTER: ICD-10-CM

## 2023-11-20 DIAGNOSIS — Z90.79 ACQUIRED ABSENCE OF OTHER GENITAL ORGAN(S): ICD-10-CM

## 2023-11-20 DIAGNOSIS — O99.891 OTHER SPECIFIED DISEASES AND CONDITIONS COMPLICATING PREGNANCY: ICD-10-CM

## 2023-11-28 NOTE — ED ADULT TRIAGE NOTE - BRAND OF FIRST COVID-19 BOOSTER
Prep meds ordered     Shayna Lepe [facility] Nurse Msg Pool  [place] [facility]     
[employer]

## 2023-12-29 ENCOUNTER — OUTPATIENT (OUTPATIENT)
Dept: INPATIENT UNIT | Facility: HOSPITAL | Age: 38
LOS: 1 days | Discharge: HOME | End: 2023-12-29
Payer: MEDICAID

## 2023-12-29 VITALS — SYSTOLIC BLOOD PRESSURE: 129 MMHG | DIASTOLIC BLOOD PRESSURE: 79 MMHG | HEART RATE: 72 BPM

## 2023-12-29 VITALS — DIASTOLIC BLOOD PRESSURE: 57 MMHG | SYSTOLIC BLOOD PRESSURE: 118 MMHG | HEART RATE: 60 BPM

## 2023-12-29 DIAGNOSIS — Z90.79 ACQUIRED ABSENCE OF OTHER GENITAL ORGAN(S): Chronic | ICD-10-CM

## 2023-12-29 DIAGNOSIS — Z90.49 ACQUIRED ABSENCE OF OTHER SPECIFIED PARTS OF DIGESTIVE TRACT: Chronic | ICD-10-CM

## 2023-12-29 DIAGNOSIS — O26.899 OTHER SPECIFIED PREGNANCY RELATED CONDITIONS, UNSPECIFIED TRIMESTER: ICD-10-CM

## 2023-12-29 DIAGNOSIS — Z98.890 OTHER SPECIFIED POSTPROCEDURAL STATES: Chronic | ICD-10-CM

## 2023-12-29 PROCEDURE — 59025 FETAL NON-STRESS TEST: CPT | Mod: 26

## 2023-12-29 PROCEDURE — 99221 1ST HOSP IP/OBS SF/LOW 40: CPT | Mod: 25

## 2023-12-29 NOTE — OB PROVIDER TRIAGE NOTE - NSHPPHYSICALEXAM_GEN_ALL_CORE
Vital Signs Last 24 Hrs  HR: 68 (29 Dec 2023 00:40) (68 - 72)  BP: 115/75 (29 Dec 2023 00:40) (115/75 - 129/79)    Assessment reveals VSS  General: Female sitting comfortably in no apparent distress  Neuro: No facial asymmetry, no slurred speech, moves all 4 extremities  Mood: Alert and lucid, appropriate mood and affect  A&Ox3  Lungs- clear bilateral  Heart- normal rate and regular rhythm  Extremities- Warm, Dry, no edema present, good pulses   Abdomen soft, NT, gravid  Cat 1 tracing reactive, occasional ctx   Transabdominal Ultrasound- images saved ASOB  Sterile Speculum-  Transvaginal Ultrasound-  Vaginal Exam-         PLAN: Vital Signs Last 24 Hrs  HR: 68 (29 Dec 2023 00:40) (68 - 72)  BP: 115/75 (29 Dec 2023 00:40) (115/75 - 129/79)    Assessment reveals VSS  General: Female sitting comfortably in no apparent distress  Neuro: No facial asymmetry, no slurred speech, moves all 4 extremities  Mood: Alert and lucid, appropriate mood and affect  A&Ox3  Lungs- clear bilateral  Heart- normal rate and regular rhythm  Extremities- Warm, Dry, no edema present, good pulses   Abdomen soft, NT, gravid  Cat 1 tracing reactive, occasional ctx   Transabdominal Ultrasound- images saved ASOB, vtx, post placenta, bpp8/8, MVP: 4.11cm          PLAN: NST Continues

## 2023-12-29 NOTE — OB RN TRIAGE NOTE - FALL HARM RISK - UNIVERSAL INTERVENTIONS
Bed in lowest position, wheels locked, appropriate side rails in place/Call bell, personal items and telephone in reach/Instruct patient to call for assistance before getting out of bed or chair/Non-slip footwear when patient is out of bed/[place] to call system/Physically safe environment - no spills, clutter or unnecessary equipment/Purposeful Proactive Rounding/Room/bathroom lighting operational, light cord in reach Bed in lowest position, wheels locked, appropriate side rails in place/Call bell, personal items and telephone in reach/Instruct patient to call for assistance before getting out of bed or chair/Non-slip footwear when patient is out of bed/[place] to call system/Physically safe environment - no spills, clutter or unnecessary equipment/Purposeful Proactive Rounding/Room/bathroom lighting operational, light cord in reach

## 2023-12-29 NOTE — OB RN TRIAGE NOTE - NSICDXPASTSURGICALHX_GEN_ALL_CORE_FT
PAST SURGICAL HISTORY:  History of appendectomy 15 y/o    S/P laparoscopy 7/2021, 10/2021     PAST SURGICAL HISTORY:  H/O bilateral salpingectomy     History of appendectomy 13 y/o    S/P laparoscopy 7/2021, 10/2021

## 2023-12-29 NOTE — OB RN TRIAGE NOTE - NSICDXPASTMEDICALHX_GEN_ALL_CORE_FT
PAST MEDICAL HISTORY:  2019 novel coronavirus disease (COVID-19) 3/2021    Asthma no attacks- mild    AMY (iron deficiency anemia)     Type 2A von Willebrand disease      PAST MEDICAL HISTORY:  2019 novel coronavirus disease (COVID-19) 3/2021    Asthma no attacks- mild    H/O endometritis     AMY (iron deficiency anemia)     Type 2A von Willebrand disease

## 2023-12-29 NOTE — OB RN TRIAGE NOTE - NS_OBGYNHISTORY_OBGYN_ALL_OB_FT
GYN: Jennifer  OB: SAB x1 with D&C, Aug 2022      AP course complicated by  -IVF pregnancy   -Patient of [facility]   -GDMA1 GYN: Jennifer  OB: SAB x1 with D&C, Aug 2022      AP course complicated by  -IVF pregnancy   -Patient of [facility]   -GDMA1

## 2023-12-29 NOTE — OB PROVIDER TRIAGE NOTE - NS_VISITREASON1_OBGYN_ALL_OB
[facility]  [address]  [facility] [address]  Phone: [phone]    May 10, 2019        Sofie Padilla  [address] [facility] [address]          To whom it may concern:    RE: Sofie Padilla    Patient was seen and treated today at our clinic. She felt she was unable to work 5/7- 5/10/2019.    Please contact me for questions or concerns.      Sincerely,        Mely Awad PA-C   Reduced Fetal Movement

## 2023-12-29 NOTE — OB PROVIDER TRIAGE NOTE - PLAN OF CARE
D/C Home  D/W Dr. Kay and Dr. Huggins  NST Reactive BPP 8/8  No evidence of acute process  Normal fetal testing  Follow up at next scheduled prenatal appointment  Return for decreased fetal movement, loss of fluid or vaginal bleeding  Increase oral hydration  Signs and Symptoms of Labor reviewed   Kick Counts Reviewed

## 2023-12-29 NOTE — OB PROVIDER TRIAGE NOTE - NS_OBGYNHISTORY_OBGYN_ALL_OB_FT
GYN: Jennifer  OB: SAB x1 with D&C, Aug 2022      AP course complicated by  -IVF pregnancy   -Patient of [facility]   -GDMA1 GYN: Jennifer  OB: SAB x1 with D&C, Aug 2022      AP course complicated by  -IVF pregnancy   -Patient of [facility]   -GDMA1 GYN: left salpingectomy and right tubal ligation (unable to remove tube because severe adhesive disease) for treatment of tubal factor infertility  OB: SAB x1 with D&C, Aug 2022      AP course complicated by  -IVF pregnancy embryo transfer   -Patient of [facility]   -GDMA1 GYN: left salpingectomy and right tubal ligation (unable to remove tube because severe adhesive disease) for treatment of tubal factor infertility  OB: SAB x1 with D&C, Aug 2022      AP course complicated by  -IVF pregnancy embryo transfer   -Patient of [facility]   -GDMA1 GYN: left salpingectomy and right tubal ligation (unable to remove tube because severe adhesive disease) for treatment of tubal factor infertility  OB: SAB x1 with D&C, Aug 2022      AP course complicated by  -IVF pregnancy embryo transfer   -Patient of [facility]   -GDMA1  -James SHARP GYN: left salpingectomy and right tubal ligation (unable to remove tube because severe adhesive disease) for treatment of tubal factor infertility  OB: SAB x1 with D&C, Aug 2022      AP course complicated by  -IVF pregnancy embryo transfer   -Patient of [facility]   -GDMA1  -James SHARP

## 2023-12-29 NOTE — OB PROVIDER TRIAGE NOTE - HISTORY OF PRESENT ILLNESS
39y/o  @30.6wks presents with decreased fetal movement today, reports good fetal movement while in triage.   Denies LOF/VB    Allergies: Denies  Medications: PNV, Colace, Synthroid 25mcg, Aspirin     Medical HX: Von Willebrand Type 2A, Hypothyroidism, Asthma   Surgical HX: Appendectomy as a child, ExLap for infertility   Denies etoh/smoke/drugs/psy   37y/o  @30.6wks presents with decreased fetal movement today, reports good fetal movement while in triage.   Denies LOF/VB    Allergies: Denies  Medications: PNV, Colace, Synthroid 25mcg, Aspirin     Medical HX: Von Willebrand Type 2A, Hypothyroidism, Asthma   Surgical HX: Appendectomy as a child, ExLap for infertility   Denies etoh/smoke/drugs/psy   37y/o  @30.6wks presents with decreased fetal movement today feels small kicks not her usual big ones, reports good fetal movement while in triage.   Denies LOF/VB    Allergies: Denies  Medications: PNV, Colace, Synthroid 25mcg, Aspirin     Medical HX: Von Willebrand Type 2A, Hypothyroidism, Asthma   Surgical HX: Appendectomy as a child, ExLap for infertility   Denies etoh/smoke/drugs/psy   39y/o  @30.6wks presents with decreased fetal movement today feels small kicks not her usual big ones, reports good fetal movement while in triage.   Denies LOF/VB  PNC: at [facility] with Dr. James Martinez    Allergies: Denies  Medications: PNV, Colace, Synthroid 25mcg, Aspirin     Medical HX: Von Willebrand Type 2A, Hypothyroidism, Asthma   Surgical HX: Appendectomy as a child, ExLap for infertility   Denies etoh/smoke/drugs/psy   37y/o  @30.6wks presents with decreased fetal movement today feels small kicks not her usual big ones, reports good fetal movement while in triage.   Denies LOF/VB  PNC: at [facility] with Dr. James Martinez    Allergies: Denies  Medications: PNV, Colace, Synthroid 25mcg, Aspirin     Medical HX: Von Willebrand Type 2A, Hypothyroidism, Asthma   Surgical HX: Appendectomy as a child, ExLap for infertility   Denies etoh/smoke/drugs/psy

## 2023-12-29 NOTE — OB PROVIDER TRIAGE NOTE - NSICDXPASTSURGICALHX_GEN_ALL_CORE_FT
PAST SURGICAL HISTORY:  History of appendectomy 15 y/o    S/P laparoscopy 7/2021, 10/2021     PAST SURGICAL HISTORY:  History of appendectomy 13 y/o    S/P laparoscopy 7/2021, 10/2021

## 2023-12-30 DIAGNOSIS — D50.9 IRON DEFICIENCY ANEMIA, UNSPECIFIED: ICD-10-CM

## 2023-12-30 DIAGNOSIS — O99.013 ANEMIA COMPLICATING PREGNANCY, THIRD TRIMESTER: ICD-10-CM

## 2023-12-30 DIAGNOSIS — O09.523 SUPERVISION OF ELDERLY MULTIGRAVIDA, THIRD TRIMESTER: ICD-10-CM

## 2023-12-30 DIAGNOSIS — O99.113 OTHER DISEASES OF THE BLOOD AND BLOOD-FORMING ORGANS AND CERTAIN DISORDERS INVOLVING THE IMMUNE MECHANISM COMPLICATING PREGNANCY, THIRD TRIMESTER: ICD-10-CM

## 2023-12-30 DIAGNOSIS — J45.909 UNSPECIFIED ASTHMA, UNCOMPLICATED: ICD-10-CM

## 2023-12-30 DIAGNOSIS — O24.410 GESTATIONAL DIABETES MELLITUS IN PREGNANCY, DIET CONTROLLED: ICD-10-CM

## 2023-12-30 DIAGNOSIS — O09.03 SUPERVISION OF PREGNANCY WITH HISTORY OF INFERTILITY, THIRD TRIMESTER: ICD-10-CM

## 2023-12-30 DIAGNOSIS — D68.020 VON WILLEBRAND DISEASE, TYPE 2A: ICD-10-CM

## 2023-12-30 DIAGNOSIS — O99.283 ENDOCRINE, NUTRITIONAL AND METABOLIC DISEASES COMPLICATING PREGNANCY, THIRD TRIMESTER: ICD-10-CM

## 2023-12-30 DIAGNOSIS — Z90.79 ACQUIRED ABSENCE OF OTHER GENITAL ORGAN(S): ICD-10-CM

## 2023-12-30 DIAGNOSIS — Z3A.30 30 WEEKS GESTATION OF PREGNANCY: ICD-10-CM

## 2023-12-30 DIAGNOSIS — Z86.16 PERSONAL HISTORY OF COVID-19: ICD-10-CM

## 2023-12-30 DIAGNOSIS — O99.513 DISEASES OF THE RESPIRATORY SYSTEM COMPLICATING PREGNANCY, THIRD TRIMESTER: ICD-10-CM

## 2023-12-30 DIAGNOSIS — O09.293 SUPERVISION OF PREGNANCY WITH OTHER POOR REPRODUCTIVE OR OBSTETRIC HISTORY, THIRD TRIMESTER: ICD-10-CM

## 2023-12-30 DIAGNOSIS — O09.813 SUPERVISION OF PREGNANCY RESULTING FROM ASSISTED REPRODUCTIVE TECHNOLOGY, THIRD TRIMESTER: ICD-10-CM

## 2023-12-30 DIAGNOSIS — O36.8130 DECREASED FETAL MOVEMENTS, THIRD TRIMESTER, NOT APPLICABLE OR UNSPECIFIED: ICD-10-CM

## 2023-12-30 DIAGNOSIS — E03.9 HYPOTHYROIDISM, UNSPECIFIED: ICD-10-CM

## 2024-01-02 PROBLEM — Z87.42 PERSONAL HISTORY OF OTHER DISEASES OF THE FEMALE GENITAL TRACT: Chronic | Status: ACTIVE | Noted: 2023-12-29

## 2024-01-03 NOTE — OB RN TRIAGE NOTE - BP NONINVASIVE SYSTOLIC (MM HG)
Additional Notes: HIGH-RISK SKIN CANCER: Patient counselled on high-risk features of skin cancer, signs of and risk for recurrence or metastasis, and management options.  Reviewed need for close surveillance by patient at home with self-exams and how to palpate own lymph nodes, as well as regular surveillance with MD.  Any relevant lymph nodes were palpated and unremarkable unless noted above.
Patient Management Risk Assessment: High
Render Risk Assessment In Note?: no
Detail Level: Simple
117

## 2024-01-07 ENCOUNTER — OUTPATIENT (OUTPATIENT)
Dept: OUTPATIENT SERVICES | Facility: HOSPITAL | Age: 39
LOS: 1 days | End: 2024-01-07
Payer: MEDICAID

## 2024-01-07 ENCOUNTER — RESULT REVIEW (OUTPATIENT)
Age: 39
End: 2024-01-07

## 2024-01-07 VITALS
DIASTOLIC BLOOD PRESSURE: 76 MMHG | HEART RATE: 74 BPM | SYSTOLIC BLOOD PRESSURE: 131 MMHG | RESPIRATION RATE: 18 BRPM | OXYGEN SATURATION: 98 % | TEMPERATURE: 98 F

## 2024-01-07 DIAGNOSIS — O26.899 OTHER SPECIFIED PREGNANCY RELATED CONDITIONS, UNSPECIFIED TRIMESTER: ICD-10-CM

## 2024-01-07 DIAGNOSIS — Z90.79 ACQUIRED ABSENCE OF OTHER GENITAL ORGAN(S): Chronic | ICD-10-CM

## 2024-01-07 DIAGNOSIS — Z98.890 OTHER SPECIFIED POSTPROCEDURAL STATES: Chronic | ICD-10-CM

## 2024-01-07 DIAGNOSIS — Z90.49 ACQUIRED ABSENCE OF OTHER SPECIFIED PARTS OF DIGESTIVE TRACT: Chronic | ICD-10-CM

## 2024-01-07 LAB
ALBUMIN SERPL ELPH-MCNC: 3.5 G/DL — SIGNIFICANT CHANGE UP (ref 3.3–5)
ALBUMIN SERPL ELPH-MCNC: 3.5 G/DL — SIGNIFICANT CHANGE UP (ref 3.3–5)
ALP SERPL-CCNC: 109 U/L — SIGNIFICANT CHANGE UP (ref 40–120)
ALP SERPL-CCNC: 109 U/L — SIGNIFICANT CHANGE UP (ref 40–120)
ALT FLD-CCNC: 14 U/L — SIGNIFICANT CHANGE UP (ref 10–45)
ALT FLD-CCNC: 14 U/L — SIGNIFICANT CHANGE UP (ref 10–45)
ANION GAP SERPL CALC-SCNC: 9 MMOL/L — SIGNIFICANT CHANGE UP (ref 5–17)
ANION GAP SERPL CALC-SCNC: 9 MMOL/L — SIGNIFICANT CHANGE UP (ref 5–17)
APPEARANCE UR: CLEAR — SIGNIFICANT CHANGE UP
APTT BLD: 29.7 SEC — SIGNIFICANT CHANGE UP (ref 24.5–35.6)
APTT BLD: 29.7 SEC — SIGNIFICANT CHANGE UP (ref 24.5–35.6)
AST SERPL-CCNC: 17 U/L — SIGNIFICANT CHANGE UP (ref 10–40)
AST SERPL-CCNC: 17 U/L — SIGNIFICANT CHANGE UP (ref 10–40)
BASOPHILS # BLD AUTO: 0.02 K/UL — SIGNIFICANT CHANGE UP (ref 0–0.2)
BASOPHILS # BLD AUTO: 0.02 K/UL — SIGNIFICANT CHANGE UP (ref 0–0.2)
BASOPHILS NFR BLD AUTO: 0.3 % — SIGNIFICANT CHANGE UP (ref 0–2)
BASOPHILS NFR BLD AUTO: 0.3 % — SIGNIFICANT CHANGE UP (ref 0–2)
BILIRUB SERPL-MCNC: 0.4 MG/DL — SIGNIFICANT CHANGE UP (ref 0.2–1.2)
BILIRUB SERPL-MCNC: 0.4 MG/DL — SIGNIFICANT CHANGE UP (ref 0.2–1.2)
BILIRUB UR-MCNC: NEGATIVE — SIGNIFICANT CHANGE UP
BUN SERPL-MCNC: 11 MG/DL — SIGNIFICANT CHANGE UP (ref 7–23)
BUN SERPL-MCNC: 11 MG/DL — SIGNIFICANT CHANGE UP (ref 7–23)
CALCIUM SERPL-MCNC: 9.4 MG/DL — SIGNIFICANT CHANGE UP (ref 8.4–10.5)
CALCIUM SERPL-MCNC: 9.4 MG/DL — SIGNIFICANT CHANGE UP (ref 8.4–10.5)
CHLORIDE SERPL-SCNC: 107 MMOL/L — SIGNIFICANT CHANGE UP (ref 96–108)
CHLORIDE SERPL-SCNC: 107 MMOL/L — SIGNIFICANT CHANGE UP (ref 96–108)
CO2 SERPL-SCNC: 22 MMOL/L — SIGNIFICANT CHANGE UP (ref 22–31)
CO2 SERPL-SCNC: 22 MMOL/L — SIGNIFICANT CHANGE UP (ref 22–31)
COLOR SPEC: YELLOW — SIGNIFICANT CHANGE UP
CREAT SERPL-MCNC: 0.53 MG/DL — SIGNIFICANT CHANGE UP (ref 0.5–1.3)
CREAT SERPL-MCNC: 0.53 MG/DL — SIGNIFICANT CHANGE UP (ref 0.5–1.3)
DIFF PNL FLD: NEGATIVE — SIGNIFICANT CHANGE UP
EGFR: 123 ML/MIN/1.73M2 — SIGNIFICANT CHANGE UP
EGFR: 123 ML/MIN/1.73M2 — SIGNIFICANT CHANGE UP
EOSINOPHIL # BLD AUTO: 0.17 K/UL — SIGNIFICANT CHANGE UP (ref 0–0.5)
EOSINOPHIL # BLD AUTO: 0.17 K/UL — SIGNIFICANT CHANGE UP (ref 0–0.5)
EOSINOPHIL NFR BLD AUTO: 2.1 % — SIGNIFICANT CHANGE UP (ref 0–6)
EOSINOPHIL NFR BLD AUTO: 2.1 % — SIGNIFICANT CHANGE UP (ref 0–6)
FIBRINOGEN PPP-MCNC: 608 MG/DL — HIGH (ref 200–445)
FIBRINOGEN PPP-MCNC: 608 MG/DL — HIGH (ref 200–445)
GLUCOSE SERPL-MCNC: 91 MG/DL — SIGNIFICANT CHANGE UP (ref 70–99)
GLUCOSE SERPL-MCNC: 91 MG/DL — SIGNIFICANT CHANGE UP (ref 70–99)
GLUCOSE UR QL: NEGATIVE MG/DL — SIGNIFICANT CHANGE UP
HCT VFR BLD CALC: 36.7 % — SIGNIFICANT CHANGE UP (ref 34.5–45)
HCT VFR BLD CALC: 36.7 % — SIGNIFICANT CHANGE UP (ref 34.5–45)
HGB BLD-MCNC: 11.5 G/DL — SIGNIFICANT CHANGE UP (ref 11.5–15.5)
HGB BLD-MCNC: 11.5 G/DL — SIGNIFICANT CHANGE UP (ref 11.5–15.5)
IMM GRANULOCYTES NFR BLD AUTO: 0.6 % — SIGNIFICANT CHANGE UP (ref 0–0.9)
IMM GRANULOCYTES NFR BLD AUTO: 0.6 % — SIGNIFICANT CHANGE UP (ref 0–0.9)
INR BLD: 0.95 RATIO — SIGNIFICANT CHANGE UP (ref 0.85–1.18)
INR BLD: 0.95 RATIO — SIGNIFICANT CHANGE UP (ref 0.85–1.18)
KETONES UR-MCNC: NEGATIVE MG/DL — SIGNIFICANT CHANGE UP
LDH SERPL L TO P-CCNC: 211 U/L — SIGNIFICANT CHANGE UP (ref 50–242)
LDH SERPL L TO P-CCNC: 211 U/L — SIGNIFICANT CHANGE UP (ref 50–242)
LEUKOCYTE ESTERASE UR-ACNC: NEGATIVE — SIGNIFICANT CHANGE UP
LYMPHOCYTES # BLD AUTO: 1.32 K/UL — SIGNIFICANT CHANGE UP (ref 1–3.3)
LYMPHOCYTES # BLD AUTO: 1.32 K/UL — SIGNIFICANT CHANGE UP (ref 1–3.3)
LYMPHOCYTES # BLD AUTO: 16.6 % — SIGNIFICANT CHANGE UP (ref 13–44)
LYMPHOCYTES # BLD AUTO: 16.6 % — SIGNIFICANT CHANGE UP (ref 13–44)
MCHC RBC-ENTMCNC: 29.7 PG — SIGNIFICANT CHANGE UP (ref 27–34)
MCHC RBC-ENTMCNC: 29.7 PG — SIGNIFICANT CHANGE UP (ref 27–34)
MCHC RBC-ENTMCNC: 31.3 GM/DL — LOW (ref 32–36)
MCHC RBC-ENTMCNC: 31.3 GM/DL — LOW (ref 32–36)
MCV RBC AUTO: 94.8 FL — SIGNIFICANT CHANGE UP (ref 80–100)
MCV RBC AUTO: 94.8 FL — SIGNIFICANT CHANGE UP (ref 80–100)
MONOCYTES # BLD AUTO: 0.55 K/UL — SIGNIFICANT CHANGE UP (ref 0–0.9)
MONOCYTES # BLD AUTO: 0.55 K/UL — SIGNIFICANT CHANGE UP (ref 0–0.9)
MONOCYTES NFR BLD AUTO: 6.9 % — SIGNIFICANT CHANGE UP (ref 2–14)
MONOCYTES NFR BLD AUTO: 6.9 % — SIGNIFICANT CHANGE UP (ref 2–14)
NEUTROPHILS # BLD AUTO: 5.82 K/UL — SIGNIFICANT CHANGE UP (ref 1.8–7.4)
NEUTROPHILS # BLD AUTO: 5.82 K/UL — SIGNIFICANT CHANGE UP (ref 1.8–7.4)
NEUTROPHILS NFR BLD AUTO: 73.5 % — SIGNIFICANT CHANGE UP (ref 43–77)
NEUTROPHILS NFR BLD AUTO: 73.5 % — SIGNIFICANT CHANGE UP (ref 43–77)
NITRITE UR-MCNC: NEGATIVE — SIGNIFICANT CHANGE UP
NRBC # BLD: 0 /100 WBCS — SIGNIFICANT CHANGE UP (ref 0–0)
NRBC # BLD: 0 /100 WBCS — SIGNIFICANT CHANGE UP (ref 0–0)
PH UR: 6.5 — SIGNIFICANT CHANGE UP (ref 5–8)
PLATELET # BLD AUTO: 172 K/UL — SIGNIFICANT CHANGE UP (ref 150–400)
PLATELET # BLD AUTO: 172 K/UL — SIGNIFICANT CHANGE UP (ref 150–400)
POTASSIUM SERPL-MCNC: 4.3 MMOL/L — SIGNIFICANT CHANGE UP (ref 3.5–5.3)
POTASSIUM SERPL-MCNC: 4.3 MMOL/L — SIGNIFICANT CHANGE UP (ref 3.5–5.3)
POTASSIUM SERPL-SCNC: 4.3 MMOL/L — SIGNIFICANT CHANGE UP (ref 3.5–5.3)
POTASSIUM SERPL-SCNC: 4.3 MMOL/L — SIGNIFICANT CHANGE UP (ref 3.5–5.3)
PROT SERPL-MCNC: 6.7 G/DL — SIGNIFICANT CHANGE UP (ref 6–8.3)
PROT SERPL-MCNC: 6.7 G/DL — SIGNIFICANT CHANGE UP (ref 6–8.3)
PROT UR-MCNC: NEGATIVE MG/DL — SIGNIFICANT CHANGE UP
PROTHROM AB SERPL-ACNC: 10.5 SEC — SIGNIFICANT CHANGE UP (ref 9.5–13)
PROTHROM AB SERPL-ACNC: 10.5 SEC — SIGNIFICANT CHANGE UP (ref 9.5–13)
RBC # BLD: 3.87 M/UL — SIGNIFICANT CHANGE UP (ref 3.8–5.2)
RBC # BLD: 3.87 M/UL — SIGNIFICANT CHANGE UP (ref 3.8–5.2)
RBC # FLD: 14.9 % — HIGH (ref 10.3–14.5)
RBC # FLD: 14.9 % — HIGH (ref 10.3–14.5)
SODIUM SERPL-SCNC: 138 MMOL/L — SIGNIFICANT CHANGE UP (ref 135–145)
SODIUM SERPL-SCNC: 138 MMOL/L — SIGNIFICANT CHANGE UP (ref 135–145)
SP GR SPEC: 1.01 — SIGNIFICANT CHANGE UP (ref 1–1.03)
URATE SERPL-MCNC: 3.9 MG/DL — SIGNIFICANT CHANGE UP (ref 2.5–7)
URATE SERPL-MCNC: 3.9 MG/DL — SIGNIFICANT CHANGE UP (ref 2.5–7)
UROBILINOGEN FLD QL: 0.2 MG/DL — SIGNIFICANT CHANGE UP (ref 0.2–1)
WBC # BLD: 7.93 K/UL — SIGNIFICANT CHANGE UP (ref 3.8–10.5)
WBC # BLD: 7.93 K/UL — SIGNIFICANT CHANGE UP (ref 3.8–10.5)
WBC # FLD AUTO: 7.93 K/UL — SIGNIFICANT CHANGE UP (ref 3.8–10.5)
WBC # FLD AUTO: 7.93 K/UL — SIGNIFICANT CHANGE UP (ref 3.8–10.5)

## 2024-01-07 PROCEDURE — 93970 EXTREMITY STUDY: CPT | Mod: 26

## 2024-01-07 NOTE — OB PROVIDER TRIAGE NOTE - NSOBPROVIDERNOTE_OBGYN_ALL_OB_FT
Assessment  38y  at 32w1d with multiple complaints, notable for urinary retention and LE swelling with SOB.    Plan  - Straight catheter 1200cc; sent UA/UC  - HELLP labs, LE dopplers for swelling and SOB  - Patient has f/u in the office tomorrow    Plan per attending physician, Dr. Juan Grijalva MD  PGY3 Assessment  38y  at 32w1d with multiple complaints, notable for urinary retention and LE swelling with SOB.    Plan  - Straight catheter 1200cc; sent UA/UC  - HELLP labs, LE dopplers for swelling and SOB  - Patient has f/u in the office tomorrow    Plan per attending physician, Dr. Juan Grijalva MD  PGY3    ADDENDUM @218a  - Pt endorsing continued urge but no void, bladder scan ~500cc  - Possibility of insertion of indwelling dc catheter discussed with patient  - LE dopplers returned negative, HELLP labs wnl, UA wnl    Carla Grijalva  PGY3 Assessment  38y  at 32w1d with multiple complaints, notable for urinary retention and LE swelling with SOB.    Plan  - Straight catheter 1200cc; sent UA/UC  - HELLP labs, LE dopplers for swelling and SOB  - Patient has f/u in the office tomorrow    Plan per attending physician, Dr. Juan Grijalva MD  PGY3    ADDENDUM @218a  - Pt endorsing continued urge but no void, bladder scan ~500cc  - Possibility of insertion of indwelling dc catheter discussed with patient  - LE dopplers returned negative, HELLP labs wnl, UA wnl    Carla Grijalva  PGY3    ADDENDUM @4a  - Pt received leg bag teaching  - Tylenol x1 for discomfort  - Given number for [facility] on d/c per Urology PA: [phone] for appt later this week for trial of void    d/w attending Dr Juan Grijalva  PGY3 Assessment  38y  at 32w1d with multiple complaints, notable for urinary retention and LE swelling with SOB.    Plan  - Straight catheter 1200cc; sent UA/UC  - HELLP labs, LE dopplers for swelling and SOB  - Patient has f/u in the office tomorrow    Plan per attending physician, Dr. Juan Grijalva MD  PGY3    ADDENDUM @218a  - Pt endorsing continued urge but no void, bladder scan ~500cc  - Possibility of insertion of indwelling dc catheter discussed with patient  - LE dopplers returned negative, HELLP labs wnl, UA wnl    Carla Grijalva  PGY3    ADDENDUM @4a  - Pt received leg bag teaching  - Tylenol x1 for discomfort  - Given number for [facility] on d/c per Urology PA: [phone] for appt later this week for trial of void    d/w attending Dr Juan Grijalva  PGY3 Assessment  38y  at 32w1d with multiple complaints, notable for urinary retention and LE swelling with SOB.    Plan  - Straight catheter 1200cc; sent UA/UC  - HELLP labs, LE dopplers for swelling and SOB  - Patient has f/u in the office tomorrow    Plan per attending physician, Dr. Juan Grijalva MD  PGY3    ADDENDUM @218a  - Pt endorsing continued urge but no void, bladder scan ~500cc  - Possibility of insertion of indwelling dc catheter discussed with patient  - LE dopplers returned negative, HELLP labs wnl, UA wnl    Carla Grijalva  PGY3    ADDENDUM @4a  - Pt received leg bag teaching  - Tylenol x1 for discomfort  - Given number for [facility] on d/c per Urology PA: [phone] for appt later this week for trial of void    d/w attending Dr Juan Grijalva  PGY3      ADDENDUM @6:30a  Received sign out from night team. Patient returned to triage reporting "could not walk down the horta" due to discomfort from catheter. Reports burning. Requesting removal of catheter. Patient counseled on recommendation to continue with indwelling dc until follow-up with Urology due to emergent nature of urinary retention and increased risk of infection (including life-threatening sepsis and harm to fetus) in that situation. Patient endorses understanding. She is aware she can use Tylenol and Lidocaine jelly for catheter. She continues to request removal.  - Dc catheter to be removed per patient request  - Strict return precautions given for retention, sign/symptom infection  - Patient to follow-up with Urology and Dr. Martinez    D/w Dr. Juan Ortiz PGY2 Assessment  38y  at 32w1d with multiple complaints, notable for urinary retention and LE swelling with SOB.    Plan  - Straight catheter 1200cc; sent UA/UC  - HELLP labs, LE dopplers for swelling and SOB  - Patient has f/u in the office tomorrow    Plan per attending physician, Dr. Juan Grijalva MD  PGY3    ADDENDUM @218a  - Pt endorsing continued urge but no void, bladder scan ~500cc  - Possibility of insertion of indwelling dc catheter discussed with patient  - LE dopplers returned negative, HELLP labs wnl, UA wnl    Carla Grijalva  PGY3    ADDENDUM @4a  - Pt received leg bag teaching  - Tylenol x1 for discomfort  - Given number for [facility] on d/c per Urology PA: [phone] for appt later this week for trial of void    d/w attending Dr Juan Grijalva  PGY3      ADDENDUM @6:30a  Received sign out from night team. Patient returned to triage reporting "could not walk down the horta" due to discomfort from catheter. Reports burning. Requesting removal of catheter. Patient counseled on recommendation to continue with indwelling dc until follow-up with Urology due to emergent nature of urinary retention and increased risk of infection (including life-threatening sepsis and harm to fetus) in that situation. Patient endorses understanding. She is aware she can use Tylenol and Lidocaine jelly for catheter. She continues to request removal.  - Dc catheter to be removed per patient request  - Strict return precautions given for retention, sign/symptom infection  - Patient to follow-up with Urology and Dr. Martinez    D/w Dr. Juan Ortiz PGY2

## 2024-01-07 NOTE — OB RN TRIAGE NOTE - LIVING CHILDREN, OB PROFILE
Problem: Respiratory - Adult  Goal: Achieves optimal ventilation and oxygenation  Outcome: Progressing 0

## 2024-01-07 NOTE — OB PROVIDER TRIAGE NOTE - HISTORY OF PRESENT ILLNESS
R3 Triage H&P    Subjective  HPI: 38y  at 32w1d presents for abdominal distension and back pain exacerbated by urinary retention since 130pm, and lower extremity swelling with shortness of breath. +FM. -LOF. -CTXs. -VB. Pt denies any other concerns.    Pt states she has had a repeat episode of urinary retention when she was around 24w pregnant, was straight catheterized 1200cc at [facility] and had a successful trial of void. States that she has been drinking water all day however since 130pm she has been feeling the urge to go but has been unable to and as a result has felt a lot of abdominal discomfort and back pain. Also notes some dysuria the past few days. Denies constipation or diarrhea. Has also noticed lower extremity swelling getting worse over the past few days and also some shortness of breath, relieved by sitting up. Saturating well on RA in triage and speaking comfortably in full sentences.    – PNC: IVF pregnancy.  – OBHx: SAB s/p D&Cx1  – GynHx: hx tubal obstruction; denies fibroids, cysts, endometriosis, abnormal pap smears, STIs  – PMH: Von Willenbrand, hypothyroidism, mild asthma (no intub/hosp)  – PSH: open appendectomy (as a child), LSC converted to ex-lap L salpingectomy and R tubal ligation for tubal obstruction discovered during IVF process  – Psych: denies   – Social: denies   – Meds: PNV, bASA, Colace, Synthroid  – Allergies: NKDA  – Will accept blood transfusions? Yes    Objective  – VS  T(C): 36.6 (24 @ 20:22)  HR: 68 (24 @ 21:38)  BP: 131/76 (24 @ 20:22)  RR: 18 (24 @ 20:22)  SpO2: 100% (24 @ 21:38)    Physical Exam  CV: RRR  Pulm: breathing comfortably on RA  Abd: gravid, nontender  Extr: moving all extremities with ease    – VE: //  – FHT: baseline 145, mod variability, +accels, -decels  – [place]: irregular  – Sono: cephalic, BPP 8/8; bladder with 500cc+ (not fully measured) R3 Triage H&P    Subjective  HPI: 38y  at 32w1d presents for abdominal distension and back pain exacerbated by urinary retention since 130pm, and lower extremity swelling with shortness of breath. +FM. -LOF. -CTXs. -VB. Pt denies any other concerns.    Pt states she has had a repeat episode of urinary retention when she was around 24w pregnant, was straight catheterized 1200cc at [facility] and had a successful trial of void. States that she has been drinking water all day however since 130pm she has been feeling the urge to go but has been unable to and as a result has felt a lot of abdominal discomfort and back pain. Also notes some dysuria the past few days. Denies constipation or diarrhea. Has also noticed lower extremity swelling getting worse over the past few days and also some shortness of breath, relieved by sitting up. Saturating well on RA in triage and speaking comfortably in full sentences.    – PNC: IVF pregnancy.  – OBHx: SAB s/p D&Cx1  – GynHx: hx tubal obstruction; denies fibroids, cysts, endometriosis, abnormal pap smears, STIs  – PMH: Von Willenbrand, hypothyroidism, mild asthma (no intub/hosp)  – PSH: open appendectomy (as a child), LSC converted to ex-lap L salpingectomy and R tubal ligation for tubal obstruction discovered during IVF process  – Psych: denies   – Social: denies   – Meds: PNV, bASA, Colace, Synthroid  – Allergies: NKDA  – Will accept blood transfusions? Yes    Objective  – VS  T(C): 36.6 (24 @ 20:22)  HR: 68 (24 @ 21:38)  BP: 131/76 (24 @ 20:22)  RR: 18 (24 @ 20:22)  SpO2: 100% (24 @ 21:38)    Physical Exam  CV: RRR  Pulm: breathing comfortably on RA  Abd: gravid, nontender  Extr: moving all extremities with ease    – VE: //  – FHT: baseline 145, mod variability, +accels, -decels  – [place]: irregular  – Sono: cephalic, BPP 8/8; bladder with 500cc+ (not fully measured)

## 2024-01-07 NOTE — OB RN TRIAGE NOTE - FALL HARM RISK - UNIVERSAL INTERVENTIONS
Bed in lowest position, wheels locked, appropriate side rails in place/Call bell, personal items and telephone in reach/Instruct patient to call for assistance before getting out of bed or chair/Non-slip footwear when patient is out of bed/[place] to call system/Physically safe environment - no spills, clutter or unnecessary equipment/Purposeful Proactive Rounding/Room/bathroom lighting operational, light cord in reach Bed in lowest position, wheels locked, appropriate side rails in place/Call bell, personal items and telephone in reach/Instruct patient to call for assistance before getting out of bed or chair/Non-slip footwear when patient is out of bed/[place] to call system/Physically safe environment - no spills, clutter or unnecessary equipment/Purposeful Proactive Rounding/Room/bathroom lighting operational, light cord in reach

## 2024-01-07 NOTE — OB PROVIDER TRIAGE NOTE - NS_FINALEDD_OBGYN_ALL_OB_DT
Exclude Pending Lab and Radiology orders from printing on the Patient's Discharge Instructions, due to Privacy Concerns.
02-Mar-2024

## 2024-01-08 ENCOUNTER — APPOINTMENT (OUTPATIENT)
Dept: UROLOGY | Facility: CLINIC | Age: 39
End: 2024-01-08
Payer: MEDICAID

## 2024-01-08 VITALS — TEMPERATURE: 98 F | HEART RATE: 74 BPM | SYSTOLIC BLOOD PRESSURE: 134 MMHG | DIASTOLIC BLOOD PRESSURE: 63 MMHG

## 2024-01-08 VITALS
DIASTOLIC BLOOD PRESSURE: 85 MMHG | OXYGEN SATURATION: 99 % | SYSTOLIC BLOOD PRESSURE: 136 MMHG | TEMPERATURE: 98.3 F | RESPIRATION RATE: 16 BRPM | HEART RATE: 82 BPM

## 2024-01-08 LAB
CREAT ?TM UR-MCNC: 49 MG/DL — SIGNIFICANT CHANGE UP
CREAT ?TM UR-MCNC: 49 MG/DL — SIGNIFICANT CHANGE UP
CULTURE RESULTS: NO GROWTH — SIGNIFICANT CHANGE UP
CULTURE RESULTS: NO GROWTH — SIGNIFICANT CHANGE UP
PROT ?TM UR-MCNC: <7 MG/DL — SIGNIFICANT CHANGE UP (ref 0–12)
PROT ?TM UR-MCNC: <7 MG/DL — SIGNIFICANT CHANGE UP (ref 0–12)
PROT/CREAT UR-RTO: <0.1 RATIO — SIGNIFICANT CHANGE UP (ref 0–0.2)
PROT/CREAT UR-RTO: <0.1 RATIO — SIGNIFICANT CHANGE UP (ref 0–0.2)
SPECIMEN SOURCE: SIGNIFICANT CHANGE UP
SPECIMEN SOURCE: SIGNIFICANT CHANGE UP

## 2024-01-08 PROCEDURE — 82570 ASSAY OF URINE CREATININE: CPT

## 2024-01-08 PROCEDURE — 51700 IRRIGATION OF BLADDER: CPT

## 2024-01-08 PROCEDURE — 84550 ASSAY OF BLOOD/URIC ACID: CPT

## 2024-01-08 PROCEDURE — 81003 URINALYSIS AUTO W/O SCOPE: CPT

## 2024-01-08 PROCEDURE — 99203 OFFICE O/P NEW LOW 30 MIN: CPT | Mod: 25

## 2024-01-08 PROCEDURE — 85025 COMPLETE CBC W/AUTO DIFF WBC: CPT

## 2024-01-08 PROCEDURE — 87086 URINE CULTURE/COLONY COUNT: CPT

## 2024-01-08 PROCEDURE — G0463: CPT

## 2024-01-08 PROCEDURE — 84156 ASSAY OF PROTEIN URINE: CPT

## 2024-01-08 PROCEDURE — 85730 THROMBOPLASTIN TIME PARTIAL: CPT

## 2024-01-08 PROCEDURE — 85610 PROTHROMBIN TIME: CPT

## 2024-01-08 PROCEDURE — 85384 FIBRINOGEN ACTIVITY: CPT

## 2024-01-08 PROCEDURE — A4216: CPT | Mod: NC

## 2024-01-08 PROCEDURE — 83615 LACTATE (LD) (LDH) ENZYME: CPT

## 2024-01-08 PROCEDURE — 93970 EXTREMITY STUDY: CPT

## 2024-01-08 PROCEDURE — 80053 COMPREHEN METABOLIC PANEL: CPT

## 2024-01-08 RX ORDER — LIDOCAINE 4 G/100G
1 CREAM TOPICAL
Qty: 1 | Refills: 0
Start: 2024-01-08

## 2024-01-08 RX ORDER — ACETAMINOPHEN 500 MG
975 TABLET ORAL ONCE
Refills: 0 | Status: COMPLETED | OUTPATIENT
Start: 2024-01-08 | End: 2024-01-08

## 2024-01-08 RX ADMIN — Medication 975 MILLIGRAM(S): at 04:10

## 2024-01-08 RX ADMIN — Medication 975 MILLIGRAM(S): at 04:12

## 2024-01-08 NOTE — REVIEW OF SYSTEMS
[Constipation] : constipation [see HPI] : see HPI [Easy Bleeding] : a tendency for easy bleeding [Easy Bruising] : a tendency for easy bruising [Negative] : Endocrine

## 2024-01-08 NOTE — PHYSICAL EXAM
[Normal Appearance] : normal appearance [Well Groomed] : well groomed [Edema] : no peripheral edema [Respiration, Rhythm And Depth] : normal respiratory rhythm and effort [Exaggerated Use Of Accessory Muscles For Inspiration] : no accessory muscle use [Abdomen Tenderness] : non-tender [Costovertebral Angle Tenderness] : no ~M costovertebral angle tenderness [Urethral Meatus] : normal urethra [Normal Station and Gait] : the gait and station were normal for the patient's age [] : no rash [No Focal Deficits] : no focal deficits [Oriented To Time, Place, And Person] : oriented to person, place, and time [Affect] : the affect was normal [Mood] : the mood was normal [No Palpable Adenopathy] : no palpable adenopathy [FreeTextEntry1] : c/o abd andurethral discomfort [de-identified] : bladder scan of 40-80 ml after dc placed - PREGNANT [de-identified] : meatus angulatd posteriorly and thus 14 f coude cath placed after sterile prep /10 ml sterile water in balloon

## 2024-01-08 NOTE — HISTORY OF PRESENT ILLNESS
[FreeTextEntry1] : patient 32 weeks preg ( first ) prior one also with IVF was  miscarriag no prior LUTS before pregnancy however during this pregnancy had epsiode of  AUR and needed SC and was told to void q 2 - 3 h needs to be in certain position to void after catheter was removed  worse last week no void since 2 pm yesterday and then at 8pm went to ER and SC for 1200ml and unable to void after 4-6  hours while being hydrated in ER thus Dc placed - traumatic  and  after several attempts and bleeding since placed -- dc removed - still with pain - ? balloon inflated in urethra here for further eval due to urethral pain , diff to void and hematuria :

## 2024-01-09 DIAGNOSIS — M79.89 OTHER SPECIFIED SOFT TISSUE DISORDERS: ICD-10-CM

## 2024-01-09 DIAGNOSIS — R14.0 ABDOMINAL DISTENSION (GASEOUS): ICD-10-CM

## 2024-01-09 DIAGNOSIS — R06.02 SHORTNESS OF BREATH: ICD-10-CM

## 2024-01-09 DIAGNOSIS — O99.891 OTHER SPECIFIED DISEASES AND CONDITIONS COMPLICATING PREGNANCY: ICD-10-CM

## 2024-01-09 DIAGNOSIS — R33.9 RETENTION OF URINE, UNSPECIFIED: ICD-10-CM

## 2024-01-09 DIAGNOSIS — M54.9 DORSALGIA, UNSPECIFIED: ICD-10-CM

## 2024-01-09 DIAGNOSIS — O09.523 SUPERVISION OF ELDERLY MULTIGRAVIDA, THIRD TRIMESTER: ICD-10-CM

## 2024-01-09 DIAGNOSIS — O09.293 SUPERVISION OF PREGNANCY WITH OTHER POOR REPRODUCTIVE OR OBSTETRIC HISTORY, THIRD TRIMESTER: ICD-10-CM

## 2024-01-09 DIAGNOSIS — Z3A.32 32 WEEKS GESTATION OF PREGNANCY: ICD-10-CM

## 2024-01-09 DIAGNOSIS — O26.893 OTHER SPECIFIED PREGNANCY RELATED CONDITIONS, THIRD TRIMESTER: ICD-10-CM

## 2024-01-09 DIAGNOSIS — O09.813 SUPERVISION OF PREGNANCY RESULTING FROM ASSISTED REPRODUCTIVE TECHNOLOGY, THIRD TRIMESTER: ICD-10-CM

## 2024-01-10 ENCOUNTER — APPOINTMENT (OUTPATIENT)
Dept: UROLOGY | Facility: CLINIC | Age: 39
End: 2024-01-10
Payer: MEDICAID

## 2024-01-10 VITALS
OXYGEN SATURATION: 99 % | HEIGHT: 64 IN | DIASTOLIC BLOOD PRESSURE: 81 MMHG | TEMPERATURE: 98 F | HEART RATE: 74 BPM | RESPIRATION RATE: 16 BRPM | WEIGHT: 139 LBS | BODY MASS INDEX: 23.73 KG/M2 | SYSTOLIC BLOOD PRESSURE: 126 MMHG

## 2024-01-10 DIAGNOSIS — Z87.448 PERSONAL HISTORY OF OTHER DISEASES OF URINARY SYSTEM: ICD-10-CM

## 2024-01-10 DIAGNOSIS — R39.89 OTHER SYMPTOMS AND SIGNS INVOLVING THE GENITOURINARY SYSTEM: ICD-10-CM

## 2024-01-10 PROBLEM — K35.32 ACUTE APPENDICITIS WITH PERFORATION, LOCALIZED PERITONITIS, AND GANGRENE, WITHOUT ABSCESS: Chronic | Status: ACTIVE | Noted: 2024-01-07

## 2024-01-10 PROCEDURE — 99214 OFFICE O/P EST MOD 30 MIN: CPT

## 2024-01-10 NOTE — HISTORY OF PRESENT ILLNESS
[FreeTextEntry1] : After dc placed 2 d ago and irrigated of small clots had to be irrigated at home  after cont SP pressure and dec output- patient removed dc as planned and discussed she was able to void wiht small clots at home here to eval bladder emptying  no fever or N/V  bowel habits better- taking keflex :

## 2024-01-10 NOTE — ASSESSMENT
[FreeTextEntry1] : patient to cont to void on own coude cath - 14 F given to use if needed at home 2% lidocaine jelly 10 ml x 2 given  One liter sterile water for irrigation if needed given  patient to rtc 4 weeks or sooner as needed.

## 2024-01-10 NOTE — PHYSICAL EXAM
[Normal Appearance] : normal appearance [Well Groomed] : well groomed [General Appearance - In No Acute Distress] : no acute distress [Edema] : no peripheral edema [Exaggerated Use Of Accessory Muscles For Inspiration] : no accessory muscle use [Respiration, Rhythm And Depth] : normal respiratory rhythm and effort [Abdomen Soft] : soft [Abdomen Tenderness] : non-tender [Normal Station and Gait] : the gait and station were normal for the patient's age [] : no rash [No Focal Deficits] : no focal deficits [Oriented To Time, Place, And Person] : oriented to person, place, and time [Affect] : the affect was normal [Mood] : the mood was normal [de-identified] : pvr- 14 ml

## 2024-01-15 ENCOUNTER — OUTPATIENT (OUTPATIENT)
Dept: OUTPATIENT SERVICES | Facility: HOSPITAL | Age: 39
LOS: 1 days | End: 2024-01-15
Payer: MEDICAID

## 2024-01-15 ENCOUNTER — LABORATORY RESULT (OUTPATIENT)
Age: 39
End: 2024-01-15

## 2024-01-15 ENCOUNTER — APPOINTMENT (OUTPATIENT)
Dept: HEMATOLOGY ONCOLOGY | Facility: CLINIC | Age: 39
End: 2024-01-15

## 2024-01-15 DIAGNOSIS — D68.00 VON WILLEBRAND DISEASE, UNSPECIFIED: ICD-10-CM

## 2024-01-15 DIAGNOSIS — Z98.890 OTHER SPECIFIED POSTPROCEDURAL STATES: Chronic | ICD-10-CM

## 2024-01-15 DIAGNOSIS — Z90.79 ACQUIRED ABSENCE OF OTHER GENITAL ORGAN(S): Chronic | ICD-10-CM

## 2024-01-15 DIAGNOSIS — Z90.49 ACQUIRED ABSENCE OF OTHER SPECIFIED PARTS OF DIGESTIVE TRACT: Chronic | ICD-10-CM

## 2024-01-15 LAB
HCT VFR BLD CALC: 29.8 %
HGB BLD-MCNC: 9.9 G/DL
MCHC RBC-ENTMCNC: 31 PG
MCHC RBC-ENTMCNC: 33.2 G/DL
MCV RBC AUTO: 93.4 FL
PLATELET # BLD AUTO: 142 K/UL
PMV BLD: 8.7 FL
RBC # BLD: 3.19 M/UL
RBC # FLD: 15.1 %
WBC # FLD AUTO: 6.21 K/UL

## 2024-01-15 PROCEDURE — 36415 COLL VENOUS BLD VENIPUNCTURE: CPT

## 2024-01-15 PROCEDURE — 85245 CLOT FACTOR VIII VW RISTOCTN: CPT

## 2024-01-15 PROCEDURE — 85240 CLOT FACTOR VIII AHG 1 STAGE: CPT

## 2024-01-15 PROCEDURE — 85732 THROMBOPLASTIN TIME PARTIAL: CPT

## 2024-01-15 PROCEDURE — 85246 CLOT FACTOR VIII VW ANTIGEN: CPT

## 2024-01-15 PROCEDURE — 85027 COMPLETE CBC AUTOMATED: CPT

## 2024-01-15 PROCEDURE — 85247 CLOT FACTOR VIII MULTIMETRIC: CPT

## 2024-01-15 PROCEDURE — 85611 PROTHROMBIN TEST: CPT

## 2024-01-15 PROCEDURE — 82728 ASSAY OF FERRITIN: CPT

## 2024-01-16 DIAGNOSIS — D68.00 VON WILLEBRAND DISEASE, UNSPECIFIED: ICD-10-CM

## 2024-01-16 LAB — FERRITIN SERPL-MCNC: 27 NG/ML

## 2024-01-21 ENCOUNTER — OUTPATIENT (OUTPATIENT)
Dept: OUTPATIENT SERVICES | Facility: HOSPITAL | Age: 39
LOS: 1 days | End: 2024-01-21
Payer: MEDICAID

## 2024-01-21 VITALS
SYSTOLIC BLOOD PRESSURE: 133 MMHG | TEMPERATURE: 98 F | HEART RATE: 71 BPM | DIASTOLIC BLOOD PRESSURE: 80 MMHG | RESPIRATION RATE: 18 BRPM

## 2024-01-21 VITALS — RESPIRATION RATE: 14 BRPM | SYSTOLIC BLOOD PRESSURE: 144 MMHG | DIASTOLIC BLOOD PRESSURE: 77 MMHG | HEART RATE: 79 BPM

## 2024-01-21 DIAGNOSIS — Z98.890 OTHER SPECIFIED POSTPROCEDURAL STATES: Chronic | ICD-10-CM

## 2024-01-21 DIAGNOSIS — Z90.79 ACQUIRED ABSENCE OF OTHER GENITAL ORGAN(S): Chronic | ICD-10-CM

## 2024-01-21 DIAGNOSIS — Z90.49 ACQUIRED ABSENCE OF OTHER SPECIFIED PARTS OF DIGESTIVE TRACT: Chronic | ICD-10-CM

## 2024-01-21 DIAGNOSIS — O26.899 OTHER SPECIFIED PREGNANCY RELATED CONDITIONS, UNSPECIFIED TRIMESTER: ICD-10-CM

## 2024-01-21 LAB
ALBUMIN SERPL ELPH-MCNC: 3.5 G/DL — SIGNIFICANT CHANGE UP (ref 3.3–5)
ALP SERPL-CCNC: 138 U/L — HIGH (ref 40–120)
ALT FLD-CCNC: 16 U/L — SIGNIFICANT CHANGE UP (ref 10–45)
ANION GAP SERPL CALC-SCNC: 12 MMOL/L — SIGNIFICANT CHANGE UP (ref 5–17)
APPEARANCE UR: CLEAR — SIGNIFICANT CHANGE UP
APTT BLD: 30.7 SEC — SIGNIFICANT CHANGE UP (ref 24.5–35.6)
AST SERPL-CCNC: 18 U/L — SIGNIFICANT CHANGE UP (ref 10–40)
BASOPHILS # BLD AUTO: 0.02 K/UL — SIGNIFICANT CHANGE UP (ref 0–0.2)
BASOPHILS NFR BLD AUTO: 0.3 % — SIGNIFICANT CHANGE UP (ref 0–2)
BILIRUB SERPL-MCNC: 0.3 MG/DL — SIGNIFICANT CHANGE UP (ref 0.2–1.2)
BILIRUB UR-MCNC: NEGATIVE — SIGNIFICANT CHANGE UP
BUN SERPL-MCNC: 10 MG/DL — SIGNIFICANT CHANGE UP (ref 7–23)
CALCIUM SERPL-MCNC: 9.1 MG/DL — SIGNIFICANT CHANGE UP (ref 8.4–10.5)
CHLORIDE SERPL-SCNC: 107 MMOL/L — SIGNIFICANT CHANGE UP (ref 96–108)
CO2 SERPL-SCNC: 21 MMOL/L — LOW (ref 22–31)
COLOR SPEC: YELLOW — SIGNIFICANT CHANGE UP
CREAT SERPL-MCNC: 0.75 MG/DL — SIGNIFICANT CHANGE UP (ref 0.5–1.3)
DIFF PNL FLD: NEGATIVE — SIGNIFICANT CHANGE UP
EGFR: 106 ML/MIN/1.73M2 — SIGNIFICANT CHANGE UP
EOSINOPHIL # BLD AUTO: 0.13 K/UL — SIGNIFICANT CHANGE UP (ref 0–0.5)
EOSINOPHIL NFR BLD AUTO: 2.1 % — SIGNIFICANT CHANGE UP (ref 0–6)
FIBRINOGEN PPP-MCNC: 578 MG/DL — HIGH (ref 200–445)
GLUCOSE SERPL-MCNC: 80 MG/DL — SIGNIFICANT CHANGE UP (ref 70–99)
GLUCOSE UR QL: NEGATIVE MG/DL — SIGNIFICANT CHANGE UP
HCT VFR BLD CALC: 31.3 % — LOW (ref 34.5–45)
HGB BLD-MCNC: 9.8 G/DL — LOW (ref 11.5–15.5)
IMM GRANULOCYTES NFR BLD AUTO: 0.3 % — SIGNIFICANT CHANGE UP (ref 0–0.9)
INR BLD: 0.91 RATIO — SIGNIFICANT CHANGE UP (ref 0.85–1.18)
KETONES UR-MCNC: NEGATIVE MG/DL — SIGNIFICANT CHANGE UP
LDH SERPL L TO P-CCNC: 198 U/L — SIGNIFICANT CHANGE UP (ref 50–242)
LEUKOCYTE ESTERASE UR-ACNC: NEGATIVE — SIGNIFICANT CHANGE UP
LYMPHOCYTES # BLD AUTO: 0.97 K/UL — LOW (ref 1–3.3)
LYMPHOCYTES # BLD AUTO: 15.9 % — SIGNIFICANT CHANGE UP (ref 13–44)
MCHC RBC-ENTMCNC: 29.6 PG — SIGNIFICANT CHANGE UP (ref 27–34)
MCHC RBC-ENTMCNC: 31.3 GM/DL — LOW (ref 32–36)
MCV RBC AUTO: 94.6 FL — SIGNIFICANT CHANGE UP (ref 80–100)
MONOCYTES # BLD AUTO: 0.43 K/UL — SIGNIFICANT CHANGE UP (ref 0–0.9)
MONOCYTES NFR BLD AUTO: 7.1 % — SIGNIFICANT CHANGE UP (ref 2–14)
NEUTROPHILS # BLD AUTO: 4.52 K/UL — SIGNIFICANT CHANGE UP (ref 1.8–7.4)
NEUTROPHILS NFR BLD AUTO: 74.3 % — SIGNIFICANT CHANGE UP (ref 43–77)
NITRITE UR-MCNC: NEGATIVE — SIGNIFICANT CHANGE UP
NRBC # BLD: 0 /100 WBCS — SIGNIFICANT CHANGE UP (ref 0–0)
PH UR: 6.5 — SIGNIFICANT CHANGE UP (ref 5–8)
PLATELET # BLD AUTO: 175 K/UL — SIGNIFICANT CHANGE UP (ref 150–400)
POTASSIUM SERPL-MCNC: 4.3 MMOL/L — SIGNIFICANT CHANGE UP (ref 3.5–5.3)
POTASSIUM SERPL-SCNC: 4.3 MMOL/L — SIGNIFICANT CHANGE UP (ref 3.5–5.3)
PROT SERPL-MCNC: 6.4 G/DL — SIGNIFICANT CHANGE UP (ref 6–8.3)
PROT UR-MCNC: NEGATIVE MG/DL — SIGNIFICANT CHANGE UP
PROTHROM AB SERPL-ACNC: 10 SEC — SIGNIFICANT CHANGE UP (ref 9.5–13)
RBC # BLD: 3.31 M/UL — LOW (ref 3.8–5.2)
RBC # FLD: 14.6 % — HIGH (ref 10.3–14.5)
SODIUM SERPL-SCNC: 140 MMOL/L — SIGNIFICANT CHANGE UP (ref 135–145)
SP GR SPEC: 1.01 — SIGNIFICANT CHANGE UP (ref 1–1.03)
URATE SERPL-MCNC: 4.3 MG/DL — SIGNIFICANT CHANGE UP (ref 2.5–7)
UROBILINOGEN FLD QL: 0.2 MG/DL — SIGNIFICANT CHANGE UP (ref 0.2–1)
WBC # BLD: 6.09 K/UL — SIGNIFICANT CHANGE UP (ref 3.8–10.5)
WBC # FLD AUTO: 6.09 K/UL — SIGNIFICANT CHANGE UP (ref 3.8–10.5)

## 2024-01-21 PROCEDURE — 59025 FETAL NON-STRESS TEST: CPT | Mod: 26

## 2024-01-21 PROCEDURE — 96372 THER/PROPH/DIAG INJ SC/IM: CPT

## 2024-01-21 PROCEDURE — 81003 URINALYSIS AUTO W/O SCOPE: CPT

## 2024-01-21 PROCEDURE — 84550 ASSAY OF BLOOD/URIC ACID: CPT

## 2024-01-21 PROCEDURE — 85384 FIBRINOGEN ACTIVITY: CPT

## 2024-01-21 PROCEDURE — 36415 COLL VENOUS BLD VENIPUNCTURE: CPT

## 2024-01-21 PROCEDURE — 85025 COMPLETE CBC W/AUTO DIFF WBC: CPT

## 2024-01-21 PROCEDURE — G0463: CPT

## 2024-01-21 PROCEDURE — 83615 LACTATE (LD) (LDH) ENZYME: CPT

## 2024-01-21 PROCEDURE — 80053 COMPREHEN METABOLIC PANEL: CPT

## 2024-01-21 PROCEDURE — 99221 1ST HOSP IP/OBS SF/LOW 40: CPT

## 2024-01-21 PROCEDURE — 85730 THROMBOPLASTIN TIME PARTIAL: CPT

## 2024-01-21 PROCEDURE — 85610 PROTHROMBIN TIME: CPT

## 2024-01-21 RX ADMIN — Medication 12 MILLIGRAM(S): at 16:55

## 2024-01-21 NOTE — OB PROVIDER TRIAGE NOTE - NSOBPROVIDERNOTE_OBGYN_ALL_OB_FT
38 y.o. Female [id number] EDC 3/2/24 IUP @ 34 1/ wks. gest., (?) GBS-yet to be performed, GDMA1-controlled, with mild PEC x 2 wks. c/o elevated BP's @ home (140's/90's).  Pt. admits to mild HA & defers taking Tylenol at this time.  (-) Visual Changes.  (-) Epigastric Distress.  (+) FM.  (-) Vaginal Bleeding/Fluid.  (-) Ctx.  This is an IVF pregnancy.  Pt. has had 3 episodes of urinary retention during this pregnancy (11 wks. gest., 11/23, 1/7/24) for which pt. has had straight cath & seen URO that is attributed to kinking.  Pt. last saw URO 1/10/24 - Dr. Davis.  Pt. has Von Willenbrand's Dis Type 2.  Pt. is followed by Dr. Ivan in [place] with planned [facility] tomorrow @ 11 a.m.    PLAN:  BR            NST            HELLP labs            Serial BP's            SAURABH Mast PA-C  D/W Dr. Martinez 38 y.o. Female [id number] EDC 3/2/24 IUP @ 34 1/ wks. gest., (?) GBS-yet to be performed, GDMA1-controlled, with mild PEC x 2 wks. c/o elevated BP's @ home (140's/90's).  Pt. admits to mild HA & defers taking Tylenol at this time.  (-) Visual Changes.  (-) Epigastric Distress.  (+) FM.  (-) Vaginal Bleeding/Fluid.  (-) Ctx.  This is an IVF pregnancy.  Pt. has had 3 episodes of urinary retention during this pregnancy (11 wks. gest., 11/23, 1/7/24) for which pt. has had straight cath & seen URO that is attributed to kinking.  Pt. last saw URO 1/10/24 - Dr. Davis.  Pt. has Von Willenbrand's Dis Type 2.  Pt. is followed by Dr. Ivan in [place] with planned [facility] tomorrow @ 11 a.m.    PLAN:  BR            NST            HELLP labs            Serial BP's            SAURABH Mast PA-C  D/W Dr. Martinez    4:20 p.m. Addendum:  BP's 130-140's/80-90's                                   NST-reactive                                   Pt. asymptomatic                                   HELLP labwork WNL - P:C pending as UA collected late.                                     BMZ 1st dose now                                   D/C home to f/u with H[facility] as previously scheduled tomorrow for [facility] Infusion                                   Call OB office tomorrow in am. to arrange appt. to receive 2nd [facility] Mast PA-C  D/W Dr. Martinez 38 y.o. Female [id number] EDC 3/2/24 IUP @ 34 1/ wks. gest., (?) GBS-yet to be performed, GDMA1-controlled, with mild PEC x 2 wks. c/o elevated BP's @ home (140's/90's).  Pt. admits to mild HA & defers taking Tylenol at this time.  (-) Visual Changes.  (-) Epigastric Distress.  (+) FM.  (-) Vaginal Bleeding/Fluid.  (-) Ctx.  This is an IVF pregnancy.  Pt. has had 3 episodes of urinary retention during this pregnancy (11 wks. gest., 11/23, 1/7/24) for which pt. has had straight cath & seen URO that is attributed to kinking.  Pt. last saw URO 1/10/24 - Dr. Davis.  Pt. has Von Willenbrand's Dis Type 2.  Pt. is followed by Dr. Ivan in [place] with planned [facility] tomorrow @ 11 a.m.    PLAN:  BR            NST            HELLP labs            Serial BP's            BMZ x 1 dose in triage    MAYLIN Mast  D/W Dr. Martinez    4:20 p.m. Addendum:  BP's 130-140's/80-90's                                   NST-reactive                                   Pt. asymptomatic                                   HELLP labwork WNL - P:C pending as UA collected late.                                     BMZ 1st dose now                                   D/C home to f/u with HEM as previously scheduled tomorrow for [facility] Infusion                                   Call OB office tomorrow in am. to arrange appt. to receive out-patient dopplers to f/u LE edema    MAYLIN Mast  D/W Dr. Martinez

## 2024-01-21 NOTE — OB RN TRIAGE NOTE - NS_OBGYNHISTORY_OBGYN_ALL_OB_FT
mis w/ d&e, ivf, left salpenjectomy, appendectomy, mild seasonal asthma- albuterol, gdm-diet controlled

## 2024-01-21 NOTE — OB PROVIDER TRIAGE NOTE - HISTORY OF PRESENT ILLNESS
38 y.o. Female [id number] EDC 3/2/24 IUP @ 34 1/ wks. gest., (?) GBS-yet to be performed, GDMA1-controlled, with mild PEC x 2 wks. c/o elevated BP's @ home (140's/90's).  Pt. admits to mild HA & defers taking Tylenol at this time.  (-) Visual Changes.  (-) Epigastric Distress.  (+) FM.  (-) Vaginal Bleeding/Fluid.  (-) Ctx.  This is an IVF pregnancy.  Pt. has had 3 episodes of urinary retention during this pregnancy (11 wks. gest., 11/23, 1/7/24) for which pt. has had straight cath & seen URO that is attributed to kinking.  Pt. last saw URO 1/10/24 - Dr. Davis.  Pt. has Von Leon's Dis Type 2.  Pt. is followed by Dr. Ivan in [place] with planned Iron Infusion tomorrow @ 11 a.m.    POBHx;  2022 SAB, 1st trimester, D&C          PGYnHx:  Tubal Factor Infertility, this & previous pregnancy IVF and on Synthroid for Subclinical Hypothyroidism Dx'ed during W/U                s/p LSO & Right Tubal ligation (unable to perform RSO due to adhesions    PMHx:  Von Willenbrand's Dis Type 2, HEM:  Dr. Ivan in [place] most recently seen 1/15/24.  For Iron Transfusion (her 1st) tomorrow @ 11 a.m.             Asthma Dx'ed in childhood, uses inhaler ~ Q2 yrs.  Never intubated or intubated.             GDMA1-controlled    PSHx:  age 14 Laparotomy/Appendectomy-ruptured            D&C            7/2021 Mini Lap but unable to perform tubal surgery due to extensive adhesions            10/2021 Laparotomy LSO with Right Tubal Ligation (unable to perform RSO due to adhesions)    Meds:  PNV 1 p.o. daily-has ot taken x 1 wk. due to constipation             Baby ASA 1 p.o. daily             Synthroid 25 mcg p.o. daily             ProAir Inhaler 2 puffs Q 6H 90 mg. - last exacerbation ~ 2 yrs. ago.  Is not followed by PULM.    NKDA    SocHx:  Denies smoking tobacco/ETOH/Illegal drug use    FHx:  Paternal-CAD, HTN, Hypercholesterolemia                   38 y.o. Female [id number] EDC 3/2/24 IUP @ 34 1/ wks. gest., (?) GBS-yet to be performed, GDMA1-controlled, with mild PEC x 2 wks. c/o elevated BP's @ home (140's/90's).  Pt. admits to mild HA & defers taking Tylenol at this time.  (-) Visual Changes.  (-) Epigastric Distress.  (+) FM.  (-) Vaginal Bleeding/Fluid.  (-) Ctx.  This is an IVF pregnancy.  Pt. has had 3 episodes of urinary retention during this pregnancy (11 wks. gest., 11/23, 1/7/24) for which pt. has had straight cath & seen URO that is attributed to kinking.  Pt. last saw URO 1/10/24 - Dr. Davis.  Pt. has Von Mandarand's Dis Type 2.  Pt. is followed by Dr. Ivan in [place] with planned Iron Infusion tomorrow @ 11 a.m.  Pt. has had LE Dopplers for [place]ese. LE Edema which were nl.    POBHx;  2022 SAB, 1st trimester, D&C          PGYnHx:  Tubal Factor Infertility, this & previous pregnancy IVF and on Synthroid for Subclinical Hypothyroidism Dx'ed during W/U                s/p LSO & Right Tubal ligation (unable to perform RSO due to adhesions    PMHx:  Von Willenbrand's Dis Type 2, HEM:  Dr. Ivan in [place] most recently seen 1/15/24.  For Iron Transfusion (her 1st) tomorrow @ 11 a.m.             Asthma Dx'ed in childhood, uses inhaler ~ Q2 yrs.  Never intubated or intubated.             GDMA1-controlled    PSHx:  age 14 Laparotomy/Appendectomy-ruptured            D&C            7/2021 Mini Lap but unable to perform tubal surgery due to extensive adhesions            10/2021 Laparotomy LSO with Right Tubal Ligation (unable to perform RSO due to adhesions)    Meds:  PNV 1 p.o. daily-has ot taken x 1 wk. due to constipation             Baby ASA 1 p.o. daily             Synthroid 25 mcg p.o. daily             ProAir Inhaler 2 puffs Q 6H 90 mg. - last exacerbation ~ 2 yrs. ago.  Is not followed by PULM.    NKDA    SocHx:  Denies smoking tobacco/ETOH/Illegal drug use    FHx:  Paternal-CAD, HTN, Hypercholesterolemia

## 2024-01-21 NOTE — OB RN TRIAGE NOTE - TEMPERATURE IN CELSIUS (DEGREES C)
1600- SBAR report received from [address] 
 
0022- barclay catheter removed, assisted pt out of bed to restroom for dalton care. Pt was slightly dizzy but tolerated well. Instructed to call out for assistance out of bed. 36.7

## 2024-01-21 NOTE — OB PROVIDER TRIAGE NOTE - NS_OBGYNHISTORY_OBGYN_ALL_OB_FT
POBHx;  2022 SAB, 1st trimester, D&C          PGYnHx:  Tubal Factor Infertility, this & previous pregnancy IVF and on Synthroid for Subclinical Hypothyroidism Dx'ed during W/U                s/p LSO & Right Tubal ligation (unable to perform RSO due to adhesions

## 2024-01-21 NOTE — OB PROVIDER TRIAGE NOTE - NSHPLABSRESULTS_GEN_ALL_CORE
9.8    6.09  )-----------( 175      ( 21 Jan 2024 14:47 )             31.3     PT/INR - ( 21 Jan 2024 14:46 )   PT: 10.0 sec;   INR: 0.91 ratio         PTT - ( 21 Jan 2024 14:46 )  PTT:30.7 sec 9.8    6.09  )-----------( 175      ( 21 Jan 2024 14:47 )             31.3     PT/INR - ( 21 Jan 2024 14:46 )   PT: 10.0 sec;   INR: 0.91 ratio         PTT - ( 21 Jan 2024 14:46 )  PTT:30.7 sec    01-21    140  |  107  |  10  ----------------------------<  80  4.3   |  21<L>  |  0.75    Ca    9.1      21 Jan 2024 14:46    TPro  6.4  /  Alb  3.5  /  TBili  0.3  /  DBili  x   /  AST  18  /  ALT  16  /  AlkPhos  138<H>  01-21 9.8    6.09  )-----------( 175      ( 2024 14:47 )             31.3     PT/INR - ( 2024 14:46 )   PT: 10.0 sec;   INR: 0.91 ratio         PTT - ( 2024 14:46 )  PTT:30.7 sec        140  |  107  |  10  ----------------------------<  80  4.3   |  <L>  |  0.75    Ca    9.1      2024 14:46    TPro  6.4  /  Alb  3.5  /  TBili  0.3  /  DBili  x   /  AST  18  /  ALT  16  /  AlkPhos  138<H>      Urinalysis Basic - ( 2024 15:55 )    Color: Yellow / Appearance: Clear / S.008 / pH: x  Gluc: x / Ketone: Negative mg/dL  / Bili: Negative / Urobili: 0.2 mg/dL   Blood: x / Protein: Negative mg/dL / Nitrite: Negative   Leuk Esterase: Negative / RBC: x / WBC x   Sq Epi: x / Non Sq Epi: x / Bacteria: x

## 2024-01-22 ENCOUNTER — OUTPATIENT (OUTPATIENT)
Dept: OUTPATIENT SERVICES | Facility: HOSPITAL | Age: 39
LOS: 1 days | End: 2024-01-22
Payer: MEDICAID

## 2024-01-22 ENCOUNTER — APPOINTMENT (OUTPATIENT)
Dept: INFUSION THERAPY | Facility: CLINIC | Age: 39
End: 2024-01-22

## 2024-01-22 VITALS — HEART RATE: 88 BPM | DIASTOLIC BLOOD PRESSURE: 67 MMHG | SYSTOLIC BLOOD PRESSURE: 131 MMHG | TEMPERATURE: 97 F

## 2024-01-22 DIAGNOSIS — D68.00 VON WILLEBRAND DISEASE, UNSPECIFIED: ICD-10-CM

## 2024-01-22 DIAGNOSIS — Z98.890 OTHER SPECIFIED POSTPROCEDURAL STATES: Chronic | ICD-10-CM

## 2024-01-22 DIAGNOSIS — Z90.79 ACQUIRED ABSENCE OF OTHER GENITAL ORGAN(S): Chronic | ICD-10-CM

## 2024-01-22 DIAGNOSIS — Z00.8 ENCOUNTER FOR OTHER GENERAL EXAMINATION: ICD-10-CM

## 2024-01-22 DIAGNOSIS — M79.662 PAIN IN LEFT LOWER LEG: ICD-10-CM

## 2024-01-22 DIAGNOSIS — Z90.49 ACQUIRED ABSENCE OF OTHER SPECIFIED PARTS OF DIGESTIVE TRACT: Chronic | ICD-10-CM

## 2024-01-22 PROCEDURE — 96365 THER/PROPH/DIAG IV INF INIT: CPT

## 2024-01-22 PROCEDURE — 93970 EXTREMITY STUDY: CPT | Mod: 26

## 2024-01-22 PROCEDURE — 93970 EXTREMITY STUDY: CPT

## 2024-01-22 RX ORDER — IRON SUCROSE 20 MG/ML
200 INJECTION, SOLUTION INTRAVENOUS ONCE
Refills: 0 | Status: COMPLETED | OUTPATIENT
Start: 2024-01-22 | End: 2024-01-22

## 2024-01-22 RX ADMIN — IRON SUCROSE 220 MILLIGRAM(S): 20 INJECTION, SOLUTION INTRAVENOUS at 12:55

## 2024-01-23 DIAGNOSIS — O99.513 DISEASES OF THE RESPIRATORY SYSTEM COMPLICATING PREGNANCY, THIRD TRIMESTER: ICD-10-CM

## 2024-01-23 DIAGNOSIS — M79.662 PAIN IN LEFT LOWER LEG: ICD-10-CM

## 2024-01-23 DIAGNOSIS — O14.00 MILD TO MODERATE PRE-ECLAMPSIA, UNSPECIFIED TRIMESTER: ICD-10-CM

## 2024-01-23 DIAGNOSIS — O09.813 SUPERVISION OF PREGNANCY RESULTING FROM ASSISTED REPRODUCTIVE TECHNOLOGY, THIRD TRIMESTER: ICD-10-CM

## 2024-01-23 DIAGNOSIS — O09.293 SUPERVISION OF PREGNANCY WITH OTHER POOR REPRODUCTIVE OR OBSTETRIC HISTORY, THIRD TRIMESTER: ICD-10-CM

## 2024-01-23 DIAGNOSIS — R33.9 RETENTION OF URINE, UNSPECIFIED: ICD-10-CM

## 2024-01-23 DIAGNOSIS — J45.909 UNSPECIFIED ASTHMA, UNCOMPLICATED: ICD-10-CM

## 2024-01-23 DIAGNOSIS — O24.410 GESTATIONAL DIABETES MELLITUS IN PREGNANCY, DIET CONTROLLED: ICD-10-CM

## 2024-01-23 DIAGNOSIS — E03.9 HYPOTHYROIDISM, UNSPECIFIED: ICD-10-CM

## 2024-01-23 DIAGNOSIS — O99.283 ENDOCRINE, NUTRITIONAL AND METABOLIC DISEASES COMPLICATING PREGNANCY, THIRD TRIMESTER: ICD-10-CM

## 2024-01-23 DIAGNOSIS — Z3A.34 34 WEEKS GESTATION OF PREGNANCY: ICD-10-CM

## 2024-01-23 DIAGNOSIS — O09.523 SUPERVISION OF ELDERLY MULTIGRAVIDA, THIRD TRIMESTER: ICD-10-CM

## 2024-01-23 DIAGNOSIS — O26.893 OTHER SPECIFIED PREGNANCY RELATED CONDITIONS, THIRD TRIMESTER: ICD-10-CM

## 2024-01-27 ENCOUNTER — OUTPATIENT (OUTPATIENT)
Dept: OUTPATIENT SERVICES | Facility: HOSPITAL | Age: 39
LOS: 1 days | End: 2024-01-27
Payer: MEDICAID

## 2024-01-27 VITALS
SYSTOLIC BLOOD PRESSURE: 152 MMHG | DIASTOLIC BLOOD PRESSURE: 79 MMHG | RESPIRATION RATE: 16 BRPM | TEMPERATURE: 98 F | OXYGEN SATURATION: 100 % | HEART RATE: 76 BPM

## 2024-01-27 DIAGNOSIS — Z90.79 ACQUIRED ABSENCE OF OTHER GENITAL ORGAN(S): Chronic | ICD-10-CM

## 2024-01-27 DIAGNOSIS — O26.899 OTHER SPECIFIED PREGNANCY RELATED CONDITIONS, UNSPECIFIED TRIMESTER: ICD-10-CM

## 2024-01-27 DIAGNOSIS — Z90.49 ACQUIRED ABSENCE OF OTHER SPECIFIED PARTS OF DIGESTIVE TRACT: Chronic | ICD-10-CM

## 2024-01-27 DIAGNOSIS — Z98.890 OTHER SPECIFIED POSTPROCEDURAL STATES: Chronic | ICD-10-CM

## 2024-01-27 PROCEDURE — 80053 COMPREHEN METABOLIC PANEL: CPT

## 2024-01-27 PROCEDURE — G0463: CPT

## 2024-01-27 PROCEDURE — 84550 ASSAY OF BLOOD/URIC ACID: CPT

## 2024-01-27 PROCEDURE — 81001 URINALYSIS AUTO W/SCOPE: CPT

## 2024-01-27 PROCEDURE — 82570 ASSAY OF URINE CREATININE: CPT

## 2024-01-27 PROCEDURE — 83615 LACTATE (LD) (LDH) ENZYME: CPT

## 2024-01-27 PROCEDURE — 84156 ASSAY OF PROTEIN URINE: CPT

## 2024-01-27 PROCEDURE — 85025 COMPLETE CBC W/AUTO DIFF WBC: CPT

## 2024-01-27 PROCEDURE — 85384 FIBRINOGEN ACTIVITY: CPT

## 2024-01-27 PROCEDURE — 59025 FETAL NON-STRESS TEST: CPT | Mod: 26

## 2024-01-27 PROCEDURE — 85610 PROTHROMBIN TIME: CPT

## 2024-01-27 PROCEDURE — 85730 THROMBOPLASTIN TIME PARTIAL: CPT

## 2024-01-27 NOTE — OB RN TRIAGE NOTE - NS_OBGYNHISTORY_OBGYN_ALL_OB_FT
misc x 1 at 9 weeks w/ d&e (2022)  IVF pregnancy   GDM (diet controlled)  gHTN   laparotomy w salp (L tube removed and R tube disconnected)  rocio Santiago

## 2024-01-28 VITALS
TEMPERATURE: 98 F | OXYGEN SATURATION: 91 % | HEART RATE: 78 BPM | DIASTOLIC BLOOD PRESSURE: 70 MMHG | RESPIRATION RATE: 16 BRPM | SYSTOLIC BLOOD PRESSURE: 139 MMHG

## 2024-01-28 LAB
ALBUMIN SERPL ELPH-MCNC: 3.4 G/DL — SIGNIFICANT CHANGE UP (ref 3.3–5)
ALP SERPL-CCNC: 141 U/L — HIGH (ref 40–120)
ALT FLD-CCNC: 15 U/L — SIGNIFICANT CHANGE UP (ref 10–45)
ANION GAP SERPL CALC-SCNC: 11 MMOL/L — SIGNIFICANT CHANGE UP (ref 5–17)
APPEARANCE UR: CLEAR — SIGNIFICANT CHANGE UP
APTT BLD: 29 SEC — SIGNIFICANT CHANGE UP (ref 24.5–35.6)
AST SERPL-CCNC: 15 U/L — SIGNIFICANT CHANGE UP (ref 10–40)
BACTERIA # UR AUTO: NEGATIVE /HPF — SIGNIFICANT CHANGE UP
BASOPHILS # BLD AUTO: 0.01 K/UL — SIGNIFICANT CHANGE UP (ref 0–0.2)
BASOPHILS NFR BLD AUTO: 0.1 % — SIGNIFICANT CHANGE UP (ref 0–2)
BILIRUB SERPL-MCNC: 0.3 MG/DL — SIGNIFICANT CHANGE UP (ref 0.2–1.2)
BILIRUB UR-MCNC: NEGATIVE — SIGNIFICANT CHANGE UP
BUN SERPL-MCNC: 10 MG/DL — SIGNIFICANT CHANGE UP (ref 7–23)
CALCIUM SERPL-MCNC: 9 MG/DL — SIGNIFICANT CHANGE UP (ref 8.4–10.5)
CAST: 1 /LPF — SIGNIFICANT CHANGE UP (ref 0–4)
CHLORIDE SERPL-SCNC: 108 MMOL/L — SIGNIFICANT CHANGE UP (ref 96–108)
CO2 SERPL-SCNC: 21 MMOL/L — LOW (ref 22–31)
COLOR SPEC: YELLOW — SIGNIFICANT CHANGE UP
CREAT ?TM UR-MCNC: 44 MG/DL — SIGNIFICANT CHANGE UP
CREAT SERPL-MCNC: 0.73 MG/DL — SIGNIFICANT CHANGE UP (ref 0.5–1.3)
DIFF PNL FLD: ABNORMAL
EGFR: 109 ML/MIN/1.73M2 — SIGNIFICANT CHANGE UP
EOSINOPHIL # BLD AUTO: 0.06 K/UL — SIGNIFICANT CHANGE UP (ref 0–0.5)
EOSINOPHIL NFR BLD AUTO: 0.8 % — SIGNIFICANT CHANGE UP (ref 0–6)
FIBRINOGEN PPP-MCNC: 564 MG/DL — HIGH (ref 200–445)
GLUCOSE SERPL-MCNC: 91 MG/DL — SIGNIFICANT CHANGE UP (ref 70–99)
GLUCOSE UR QL: NEGATIVE MG/DL — SIGNIFICANT CHANGE UP
HCT VFR BLD CALC: 30.5 % — LOW (ref 34.5–45)
HGB BLD-MCNC: 9.5 G/DL — LOW (ref 11.5–15.5)
IMM GRANULOCYTES NFR BLD AUTO: 0.4 % — SIGNIFICANT CHANGE UP (ref 0–0.9)
INR BLD: 0.95 RATIO — SIGNIFICANT CHANGE UP (ref 0.85–1.18)
KETONES UR-MCNC: NEGATIVE MG/DL — SIGNIFICANT CHANGE UP
LDH SERPL L TO P-CCNC: 195 U/L — SIGNIFICANT CHANGE UP (ref 50–242)
LEUKOCYTE ESTERASE UR-ACNC: ABNORMAL
LYMPHOCYTES # BLD AUTO: 1.37 K/UL — SIGNIFICANT CHANGE UP (ref 1–3.3)
LYMPHOCYTES # BLD AUTO: 17.7 % — SIGNIFICANT CHANGE UP (ref 13–44)
MCHC RBC-ENTMCNC: 29.9 PG — SIGNIFICANT CHANGE UP (ref 27–34)
MCHC RBC-ENTMCNC: 31.1 GM/DL — LOW (ref 32–36)
MCV RBC AUTO: 95.9 FL — SIGNIFICANT CHANGE UP (ref 80–100)
MONOCYTES # BLD AUTO: 0.62 K/UL — SIGNIFICANT CHANGE UP (ref 0–0.9)
MONOCYTES NFR BLD AUTO: 8 % — SIGNIFICANT CHANGE UP (ref 2–14)
NEUTROPHILS # BLD AUTO: 5.65 K/UL — SIGNIFICANT CHANGE UP (ref 1.8–7.4)
NEUTROPHILS NFR BLD AUTO: 73 % — SIGNIFICANT CHANGE UP (ref 43–77)
NITRITE UR-MCNC: NEGATIVE — SIGNIFICANT CHANGE UP
NRBC # BLD: 0 /100 WBCS — SIGNIFICANT CHANGE UP (ref 0–0)
PH UR: 6.5 — SIGNIFICANT CHANGE UP (ref 5–8)
PLATELET # BLD AUTO: 182 K/UL — SIGNIFICANT CHANGE UP (ref 150–400)
POTASSIUM SERPL-MCNC: 4.6 MMOL/L — SIGNIFICANT CHANGE UP (ref 3.5–5.3)
POTASSIUM SERPL-SCNC: 4.6 MMOL/L — SIGNIFICANT CHANGE UP (ref 3.5–5.3)
PROT ?TM UR-MCNC: 16 MG/DL — HIGH (ref 0–12)
PROT SERPL-MCNC: 6.2 G/DL — SIGNIFICANT CHANGE UP (ref 6–8.3)
PROT UR-MCNC: NEGATIVE MG/DL — SIGNIFICANT CHANGE UP
PROT/CREAT UR-RTO: 0.4 RATIO — HIGH (ref 0–0.2)
PROTHROM AB SERPL-ACNC: 10.5 SEC — SIGNIFICANT CHANGE UP (ref 9.5–13)
RBC # BLD: 3.18 M/UL — LOW (ref 3.8–5.2)
RBC # FLD: 15.1 % — HIGH (ref 10.3–14.5)
RBC CASTS # UR COMP ASSIST: 12 /HPF — HIGH (ref 0–4)
SODIUM SERPL-SCNC: 140 MMOL/L — SIGNIFICANT CHANGE UP (ref 135–145)
SP GR SPEC: 1.01 — SIGNIFICANT CHANGE UP (ref 1–1.03)
SQUAMOUS # UR AUTO: 1 /HPF — SIGNIFICANT CHANGE UP (ref 0–5)
URATE SERPL-MCNC: 4.4 MG/DL — SIGNIFICANT CHANGE UP (ref 2.5–7)
UROBILINOGEN FLD QL: 0.2 MG/DL — SIGNIFICANT CHANGE UP (ref 0.2–1)
WBC # BLD: 7.74 K/UL — SIGNIFICANT CHANGE UP (ref 3.8–10.5)
WBC # FLD AUTO: 7.74 K/UL — SIGNIFICANT CHANGE UP (ref 3.8–10.5)
WBC UR QL: 5 /HPF — SIGNIFICANT CHANGE UP (ref 0–5)

## 2024-01-28 NOTE — OB PROVIDER TRIAGE NOTE - HISTORY OF PRESENT ILLNESS
38y GP at _ weeks GA presents to L&D for . Patient denies vaginal bleeding, contractions and leakage of fluid. She endorses good fetal movement. Denies fevers, chills, nausea and vomiting. No other complaints at this time. Prenatal course is significant for:     Prenatal course uncomplicated.    ROSA:      POB:  PGYN: denies fibroids, ovarian cysts, STD hx, or abnormal PAPs   PMH: Denies  PSH: Denies  SH: Denies EtOH, tobacco and illicit drug use during this pregnancy; feels safe at home   Psych Hx: denies hx of anxiety or depression  Meds: PNVs  Allergies: NKDA    EFW:    GBS:            37y/o  at 35 weeks GA presents to L&D with concern for . Patient denies vaginal bleeding, contractions and leakage of fluid. She endorses good fetal movement. Denies fevers, chills, nausea and vomiting. No other complaints at this time. Prenatal course is significant for: urinary retention, gHTN and GDMA1.  ROSA: 3/2/2024      POB: MAB s/p D+C @9w  PGYN: denies fibroids, ovarian cysts, STD hx, or abnormal PAPs   PMH: Denies  PSH: Denies  SH: Denies EtOH, tobacco and illicit drug use during this pregnancy; feels safe at home   Psych Hx: denies hx of anxiety or depression  Meds: PNVs  Allergies: NKDA    EFW:    GBS:            37y/o  at 35 weeks GA presents to L&D with concern for urinary retention and elevated BPs. Patient states that she has frequent straight catheterizations during the course of her pregnancy. Patient was last straight cathed at 3pm and noted to drain 600cc. Patient was seen by urology and states that her urinary retention is always worse when she is constipated. Patient denies HA, SOB, changes or spots in vision, new swelling in the hands and face, or RUQ/epigastric pain. Patient denies vaginal bleeding, contractions and leakage of fluid. She endorses good fetal movement. Denies fevers, chills, nausea and vomiting. No other complaints at this time. Prenatal course is significant for: urinary retention, gHTN and GDMA1.  ROSA: 3/2/2024      POB: MAB s/p D+C @9w  PGYN: denies fibroids, ovarian cysts, STD hx, or abnormal PAPs   PMH: VWD  PSH: Open appendectomy for ruptured appendix @ 14 years old, Exploratory Laparotomy L salpingectomy and R tubal ligation  for hydrosalpinx, D+C  SH: Denies EtOH, tobacco and illicit drug use during this pregnancy; feels safe at home   Psych Hx: denies hx of anxiety or depression  Meds: PNVs, Synthroid 25mg QD, Colace, Benefiber, bASA (3x/week 2/2 nosebleeds)  Allergies: NKDA    EFW: unknown    GBS: unknown

## 2024-01-28 NOTE — OB PROVIDER TRIAGE NOTE - NSHPLABSRESULTS_GEN_ALL_CORE
Blood type: AB Positive  Rubella IgG: RPR:                           9.5<L>   7.74 >-----------< 182    (  @ 23:52 )             30.5<L>    24 @ 23:52      140  |  108  |  10  ----------------------------<  91  4.6   |  21<L>  |  0.73        Ca    9.0      2024 23:52    TPro  6.2  /  Alb  3.4  /  TBili  0.3  /  DBili  x   /  AST  15  /  ALT  15  /  AlkPhos  141<H>  24 @ 23:52      Urinalysis Basic - ( 2024 23:52 )    Color: Yellow / Appearance: Clear / S.009 / pH: x  Gluc: 91 mg/dL / Ketone: Negative mg/dL  / Bili: Negative / Urobili: 0.2 mg/dL   Blood: x / Protein: Negative mg/dL / Nitrite: Negative   Leuk Esterase: Trace / RBC: 12 /HPF / WBC 5 /HPF   Sq Epi: x / Non Sq Epi: 1 /HPF / Bacteria: Negative /HPF

## 2024-01-28 NOTE — OB PROVIDER TRIAGE NOTE - NSHPPHYSICALEXAM_GEN_ALL_CORE
T(C): 36.8 (01-28-24 @ 01:45), Max: 36.8 (01-27-24 @ 21:30)  HR: 78 (01-28-24 @ 01:45) (67 - 87)  BP: 139/70 (01-28-24 @ 01:45) (121/72 - 157/81)  RR: 16 (01-28-24 @ 01:45) (16 - 18)  SpO2: 91% (01-28-24 @ 01:45) (90% - 100%)  Gen: NAD, well-appearing   Lungs: CTAB  Heart: RRR   Abd: Soft, gravid  SVE:    Bedside sono: vertex  FHT:  [place]: Vital Signs Last 24 Hrs  T(C): 36.8 (28 Jan 2024 01:45), Max: 36.8 (27 Jan 2024 21:30)  T(F): 98.24 (28 Jan 2024 01:45), Max: 98.24 (27 Jan 2024 21:30)  HR: 78 (28 Jan 2024 01:45) (67 - 87)  BP: 139/70 (28 Jan 2024 01:45) (121/72 - 157/81)  RR: 16 (28 Jan 2024 01:45) (16 - 18)  SpO2: 91% (28 Jan 2024 01:45) (90% - 100%)    Parameters below as of 27 Jan 2024 21:49  Patient On (Oxygen Delivery Method): room air      Gen: NAD, well-appearing   Lungs: CTAB  Heart: RRR   Abd: Soft, gravid  SVE:  deferred  Bedside sono: vertex  FHT: reactive  [place]: acontractile

## 2024-01-28 NOTE — OB PROVIDER TRIAGE NOTE - NSOBPROVIDERNOTE_OBGYN_ALL_OB_FT
A/P: 39 y/o GP at _ weeks GA presents to L&D for .     -Admit to L&D  -Consent  -Admission labs  -CLD or NPO, except ice chips   -IV fluids or Rotating IV fluids (LR/D5LR)  -Labor: Intact/*ROM. Latent/Active labor. [place] *. Induce labor with ______.  -Fetus: Reactive tracing.  -GBS: Negative, no GBS ppx required   -Analgesia: epi PRN  -DVT ppx: Ambulate and SCD's while in bed     Discussed with Dr. Martinez,     Yisel West, PGY3 A/P: 39y/o  at 35 weeks GA presents to L&D with concern for urinary retention and elevated BPs.    #urinary retention  -1L drained from bladder with dc   -leg bag teaching and patient to retain dc and f/u in office 2m.     #PEC  -BPs (121/72 - 157/81) no severe range BPs. Patient   -HELLP labs normal P/C 0.4  -24 u P jug sent home with patient for collection  -f/u in office 2m.     -Fetus: Reactive tracing.      Discussed with Dr. Martinez,     Yisel West, PGY3

## 2024-01-28 NOTE — OB PROVIDER TRIAGE NOTE - ADDITIONAL INSTRUCTIONS
EBULAH CM placed to pt's dad, as pt has not been seen since 2m o and did not showed for 12/2 appointment  [facility] referral was made and no appointment has been schedule  No answer, straight to vm  Left vm to return call to schedule appointment or to notify if child is been seen somewhere else  Will remains available for additional consult as needed  f/u in Dr. Martinez's office on Monday.

## 2024-01-28 NOTE — OB PROVIDER TRIAGE NOTE - NS_OBGYNHISTORY_OBGYN_ALL_OB_FT
misc x 1 at 9 weeks w/ d&e (2022)  IVF pregnancy   GDM (diet controlled)  gHTN   laparotomy w salp (L tube removed and R tube disconnected)  rocio Santiago misc x 1 at 9 weeks w/ d&c (2022)  IVF pregnancy   GDM (diet controlled)  gHTN   laparotomy w salpingectomy (L tube removed and R tube disconnected)  rocio Santiago

## 2024-01-30 ENCOUNTER — NON-APPOINTMENT (OUTPATIENT)
Age: 39
End: 2024-01-30

## 2024-01-30 ENCOUNTER — OUTPATIENT (OUTPATIENT)
Dept: INPATIENT UNIT | Facility: HOSPITAL | Age: 39
LOS: 1 days | End: 2024-01-30
Payer: MEDICAID

## 2024-01-30 VITALS — OXYGEN SATURATION: 100 % | HEART RATE: 88 BPM

## 2024-01-30 DIAGNOSIS — Z3A.35 35 WEEKS GESTATION OF PREGNANCY: ICD-10-CM

## 2024-01-30 DIAGNOSIS — R33.8 OTHER RETENTION OF URINE: ICD-10-CM

## 2024-01-30 DIAGNOSIS — Z90.49 ACQUIRED ABSENCE OF OTHER SPECIFIED PARTS OF DIGESTIVE TRACT: Chronic | ICD-10-CM

## 2024-01-30 DIAGNOSIS — O26.899 OTHER SPECIFIED PREGNANCY RELATED CONDITIONS, UNSPECIFIED TRIMESTER: ICD-10-CM

## 2024-01-30 DIAGNOSIS — Z90.79 ACQUIRED ABSENCE OF OTHER GENITAL ORGAN(S): Chronic | ICD-10-CM

## 2024-01-30 DIAGNOSIS — O09.293 SUPERVISION OF PREGNANCY WITH OTHER POOR REPRODUCTIVE OR OBSTETRIC HISTORY, THIRD TRIMESTER: ICD-10-CM

## 2024-01-30 DIAGNOSIS — O09.523 SUPERVISION OF ELDERLY MULTIGRAVIDA, THIRD TRIMESTER: ICD-10-CM

## 2024-01-30 DIAGNOSIS — Z98.890 OTHER SPECIFIED POSTPROCEDURAL STATES: Chronic | ICD-10-CM

## 2024-01-30 DIAGNOSIS — O26.893 OTHER SPECIFIED PREGNANCY RELATED CONDITIONS, THIRD TRIMESTER: ICD-10-CM

## 2024-01-30 DIAGNOSIS — O13.3 GESTATIONAL [PREGNANCY-INDUCED] HYPERTENSION WITHOUT SIGNIFICANT PROTEINURIA, THIRD TRIMESTER: ICD-10-CM

## 2024-01-30 DIAGNOSIS — O09.813 SUPERVISION OF PREGNANCY RESULTING FROM ASSISTED REPRODUCTIVE TECHNOLOGY, THIRD TRIMESTER: ICD-10-CM

## 2024-01-30 DIAGNOSIS — O24.410 GESTATIONAL DIABETES MELLITUS IN PREGNANCY, DIET CONTROLLED: ICD-10-CM

## 2024-01-30 LAB
ALBUMIN SERPL ELPH-MCNC: 3.6 G/DL — SIGNIFICANT CHANGE UP (ref 3.3–5)
ALP SERPL-CCNC: 162 U/L — HIGH (ref 40–120)
ALT FLD-CCNC: 15 U/L — SIGNIFICANT CHANGE UP (ref 10–45)
ANION GAP SERPL CALC-SCNC: 12 MMOL/L — SIGNIFICANT CHANGE UP (ref 5–17)
APTT BLD: 28.8 SEC — SIGNIFICANT CHANGE UP (ref 24.5–35.6)
AST SERPL-CCNC: 17 U/L — SIGNIFICANT CHANGE UP (ref 10–40)
BASOPHILS # BLD AUTO: 0.01 K/UL — SIGNIFICANT CHANGE UP (ref 0–0.2)
BASOPHILS NFR BLD AUTO: 0.1 % — SIGNIFICANT CHANGE UP (ref 0–2)
BILIRUB SERPL-MCNC: 0.5 MG/DL — SIGNIFICANT CHANGE UP (ref 0.2–1.2)
BUN SERPL-MCNC: 9 MG/DL — SIGNIFICANT CHANGE UP (ref 7–23)
CALCIUM SERPL-MCNC: 9.1 MG/DL — SIGNIFICANT CHANGE UP (ref 8.4–10.5)
CHLORIDE SERPL-SCNC: 104 MMOL/L — SIGNIFICANT CHANGE UP (ref 96–108)
CO2 SERPL-SCNC: 19 MMOL/L — LOW (ref 22–31)
CREAT SERPL-MCNC: 0.85 MG/DL — SIGNIFICANT CHANGE UP (ref 0.5–1.3)
EGFR: 91 ML/MIN/1.73M2 — SIGNIFICANT CHANGE UP
EOSINOPHIL # BLD AUTO: 0.11 K/UL — SIGNIFICANT CHANGE UP (ref 0–0.5)
EOSINOPHIL NFR BLD AUTO: 1.6 % — SIGNIFICANT CHANGE UP (ref 0–6)
FIBRINOGEN PPP-MCNC: 637 MG/DL — HIGH (ref 200–445)
GLUCOSE SERPL-MCNC: 105 MG/DL — HIGH (ref 70–99)
HCT VFR BLD CALC: 30.9 % — LOW (ref 34.5–45)
HGB BLD-MCNC: 9.9 G/DL — LOW (ref 11.5–15.5)
IMM GRANULOCYTES NFR BLD AUTO: 0.6 % — SIGNIFICANT CHANGE UP (ref 0–0.9)
INR BLD: 1.01 RATIO — SIGNIFICANT CHANGE UP (ref 0.85–1.18)
LDH SERPL L TO P-CCNC: 214 U/L — SIGNIFICANT CHANGE UP (ref 50–242)
LYMPHOCYTES # BLD AUTO: 0.91 K/UL — LOW (ref 1–3.3)
LYMPHOCYTES # BLD AUTO: 13.1 % — SIGNIFICANT CHANGE UP (ref 13–44)
MCHC RBC-ENTMCNC: 30.1 PG — SIGNIFICANT CHANGE UP (ref 27–34)
MCHC RBC-ENTMCNC: 32 GM/DL — SIGNIFICANT CHANGE UP (ref 32–36)
MCV RBC AUTO: 93.9 FL — SIGNIFICANT CHANGE UP (ref 80–100)
MONOCYTES # BLD AUTO: 0.63 K/UL — SIGNIFICANT CHANGE UP (ref 0–0.9)
MONOCYTES NFR BLD AUTO: 9.1 % — SIGNIFICANT CHANGE UP (ref 2–14)
NEUTROPHILS # BLD AUTO: 5.23 K/UL — SIGNIFICANT CHANGE UP (ref 1.8–7.4)
NEUTROPHILS NFR BLD AUTO: 75.5 % — SIGNIFICANT CHANGE UP (ref 43–77)
NRBC # BLD: 0 /100 WBCS — SIGNIFICANT CHANGE UP (ref 0–0)
PLATELET # BLD AUTO: 173 K/UL — SIGNIFICANT CHANGE UP (ref 150–400)
POTASSIUM SERPL-MCNC: 4.2 MMOL/L — SIGNIFICANT CHANGE UP (ref 3.5–5.3)
POTASSIUM SERPL-SCNC: 4.2 MMOL/L — SIGNIFICANT CHANGE UP (ref 3.5–5.3)
PROT SERPL-MCNC: 6.9 G/DL — SIGNIFICANT CHANGE UP (ref 6–8.3)
PROTHROM AB SERPL-ACNC: 10.6 SEC — SIGNIFICANT CHANGE UP (ref 9.5–13)
RBC # BLD: 3.29 M/UL — LOW (ref 3.8–5.2)
RBC # FLD: 15.4 % — HIGH (ref 10.3–14.5)
SODIUM SERPL-SCNC: 135 MMOL/L — SIGNIFICANT CHANGE UP (ref 135–145)
URATE SERPL-MCNC: 4.9 MG/DL — SIGNIFICANT CHANGE UP (ref 2.5–7)
WBC # BLD: 6.93 K/UL — SIGNIFICANT CHANGE UP (ref 3.8–10.5)
WBC # FLD AUTO: 6.93 K/UL — SIGNIFICANT CHANGE UP (ref 3.8–10.5)

## 2024-01-30 PROCEDURE — 85384 FIBRINOGEN ACTIVITY: CPT

## 2024-01-30 PROCEDURE — 85610 PROTHROMBIN TIME: CPT

## 2024-01-30 PROCEDURE — 85025 COMPLETE CBC W/AUTO DIFF WBC: CPT

## 2024-01-30 PROCEDURE — 80053 COMPREHEN METABOLIC PANEL: CPT

## 2024-01-30 PROCEDURE — 83615 LACTATE (LD) (LDH) ENZYME: CPT

## 2024-01-30 PROCEDURE — 85730 THROMBOPLASTIN TIME PARTIAL: CPT

## 2024-01-30 PROCEDURE — 59025 FETAL NON-STRESS TEST: CPT

## 2024-01-30 PROCEDURE — 84550 ASSAY OF BLOOD/URIC ACID: CPT

## 2024-01-30 PROCEDURE — G0463: CPT

## 2024-01-30 NOTE — OB PROVIDER TRIAGE NOTE - NSOBPROVIDERNOTE_OBGYN_ALL_OB_FT
39yo F  @35+3 h/o urinary retention, VwB s/p anesthesia consult known PEC here with severe range BP at home and urinary retention since 2pm. Gillespie catheter placed with UOP of 1100cc. Patient had one unsustained severe range BP and is asymptomatic.    Plan  - 4hr BP monitoring to r/o severe PEC  - f/u HELLP labs    Patient discussed with attending physician, Dr. Han.    DUNG Willis, PGY3 37yo F  @35+3 h/o urinary retention, VwB s/p anesthesia consult known PEC here with severe range BP at home and urinary retention since 2pm. Gillespie catheter placed with UOP of 1100cc. Patient had one unsustained severe range BP and is asymptomatic.    Plan  - 4hr BP monitoring to r/o severe PEC  - f/u HELLP labs    Patient discussed with attending physician, Dr. Han.    DUNG Willis, PGY3    **Update**  HELLP labs wnl. Patient had a false severe range pressure of 177/79 at 1:05 taken when arm was bent. Repeat /86. BP since then have been 120/70-80s. Patient continues to feel well. NST reactive    - Discharge home with Gillespie catheter. Patient has appointment with urologist today  - Continue to monitor BP and signs/symptoms of PEC at home.   - Return precautions reviewed including: /110 or higher, decreased fetal movement, contractions, leaking of fluid, vaginal bleeding  - f/u with OBGYN    d/w Dr. Emely Willis, PGY3

## 2024-01-30 NOTE — OB RN TRIAGE NOTE - NSICDXPASTMEDICALHX_GEN_ALL_CORE_FT
PAST MEDICAL HISTORY:  2019 novel coronavirus disease (COVID-19) 3/2021    Asthma no attacks- mild    H/O endometritis     AMY (iron deficiency anemia)     Ruptured appendix     Type 2A von Willebrand disease     Von Willebrand disease

## 2024-01-30 NOTE — OB RN TRIAGE NOTE - PAIN SCALE PREFERRED, PROFILE
Workforce Health  Daily Note    Visit Count: 9  Plan of Care Dates: Initial: 11-18-19 Through: 12-16-19  Referred by: Dr. Bethany Soulier, MD  Diagnosis:    840.4 (ICD-9-CM) - M75.112 (ICD-10-CM) - Incomplete tear of left rotator cuff Â     Â  716.91 (ICD-9-CM) - M19.012 (ICD-10-CM) - Arthritis of left shoulder region Â    Â  V45.89 (ICD-9-CM) - Z98.890 (ICD-10-CM) - Post-operative state        Next Referring Provider Visit: 12-11-19    Insurance: [employer] Number: [id number]  Claim Status: claim open and in good standing  Current Work Status: part time occupation: Crew  Adjustor/: Jose Pendleton  Phone number: [phone]    Date of Injury: 1/13/19  Surgery: date of surgery: 8/8/19; surgery performed: Left shoulder arthroscopic subacromial decompression, left shoulder arthroscopic distal clavicle excision, left shoulder arthroscopic rotator cuff repair   Precautions: None    Work Status:  Job Title: Crew  Current Employer: [employer]  : Evangelina Sanchez ([phone])  Last Date Worked: Working light duty as of 9/11/19  Restrictions: No restrictions on right and left 10#  Return to Work Date: 9/11/19  Job Description Obtained: no  Job Site Visit: No    Case Notes/Attendance:  Date of Communication: 11/19/19; Results: requested job description, discussed job demands with Ivette ()  Attendance Concerns: none at this time    SUBJECTIVE   Pain on arrival: 0/10. Pt reports no pain today    OBJECTIVE   Functional Status:  Functional Activity Patient Â Abilities as of  Date: 12/4/19 Job Demands  [x]? ??Â Employer [x]? ??Â Â Client Report   Lift floor to waist 37.5# 50#   Lift waist to chest 20# 20#-50#   Lift waist to overhead 12# 20#   One handed carry  Left 20# Right 25# 25#   Two Hand Carry 37.5# 50# (ice and ice cream bags)   Push/Pull Â 20# Cart with food and supplies-20#   Crouching Occ Occ   Standing Occ Occ   Walking Constant Constant   Reaching shoulder and overhead NT Occ Sweeping/mopping (in figure 8 pattern) 3 min Occ (2 1/2 hours)   Areas blank above not yet assessed due to not medically appropriate. Â Will be assessed when appropriate. Definitions: Never (N); Occasionally (O) = up to 1/3 of the day; Frequently (F) = 1/3 to 2/3 of the day; Constantly (C) = 2/3 to the full day    Treatment   Completed task sheet. Refer to task sheets for specifics. Added mopping simulation and increased UBE to level 4.5, triceps to 27# and shoulder abd to 2#    ASSESSMENT   Excellent carryover of task sheet today with several increases made and good tolerance to mopping that was added     Pain after treatment: 0/10   Result of above outlined education: Verbalizes understanding, Demonstrates understanding and Needs reinforcement    Goals: To be obtained by end of this plan of care:   1. Client will demonstrate ability to meet all job demands as listed above for return to work regular duty/ restricted duty. 2. Client will demonstrate safe and consistent use of proper posture and body mechanics with all tasks to facilitate safe return to work. 3. Client to verbalize decreased return to work concerns. 4. Client and/or employer to confirm accuracy of a job description and/or participate in a job site visit to confirm job demands to enable smooth transition to restricted duty or full duty work as appropriate. 5. Client will demonstrate independence with progressive home.       PLAN   Continue to progress UE strength and lifting tolerance in work program.       THERAPY DAILY BILLING   Evaluation Procedures:  No evaluation codes were used on this date of service    Timed Procedures:  Work hardening/conditioning initial 2 hours: 1 unit(s), 110 minutes    Untimed Procedures:  No untimed codes were used on this date of service    Total Treatment Time: 110 minutes numerical 0-10

## 2024-01-30 NOTE — OB RN TRIAGE NOTE - FALL HARM RISK - UNIVERSAL INTERVENTIONS
Bed in lowest position, wheels locked, appropriate side rails in place/Call bell, personal items and telephone in reach/Instruct patient to call for assistance before getting out of bed or chair/Non-slip footwear when patient is out of bed/[place] to call system/Physically safe environment - no spills, clutter or unnecessary equipment/Purposeful Proactive Rounding/Room/bathroom lighting operational, light cord in reach

## 2024-01-30 NOTE — OB RN TRIAGE NOTE - NSICDXPASTSURGICALHX_GEN_ALL_CORE_FT
PAST SURGICAL HISTORY:  H/O bilateral salpingectomy     History of appendectomy 13 y/o    S/P laparoscopy 7/2021, 10/2021

## 2024-01-30 NOTE — OB PROVIDER TRIAGE NOTE - HISTORY OF PRESENT ILLNESS
HPI: 39yo F  @35+3 presents for elevated BP at home of 160/105. Patient is a known PEC with 24 hr urine of 546 (). Of note, patient took BP while she had an episode of urinary retention. Patient has not been able to void today and was last straight cathed at home at 2pm with small amount of urine output. Patient has been seen multiple times in triage for urinary retention which started during her 2nd trimester of pregnancy and she follows with a urologist. She was most recently seen in triage  and sent home with a Gillespie leg bag. The next day there was blood in the Gillespie bag and patient was instructed to remove the Gillespie by her urologist. Patient was then able to void spontaneously until today. +FM. -LOF. -CTXs. -VB. Patient has deep pitting edema which has been stable for weeks. Denies HA, change in vision, RUQ pain, CP, SOB or N/V.    Patient had an anesthesia consult today and was informed that she may not be a candidate for an epidural because of her VwB and will likely get a thuy-fentanyl drip instead. An MDM is pending.    PNC: Denies prenatal issues.   GBS unknown    POB: MAB s/p D+C @9w  PGYN: denies fibroids, ovarian cysts, STD hx, or abnormal PAPs   PMH: VWD  PSH: Open appendectomy for ruptured appendix @ 14 years old, Exploratory Laparotomy L salpingectomy and R tubal ligation  for hydrosalpinx, D+C  SH: Denies EtOH, tobacco and illicit drug use during this pregnancy; feels safe at home   Psych Hx: denies hx of anxiety or depression  Meds: PNVs, Synthroid 25mg QD, Colace, Benefiber, bASA (3x/week 2/2 nosebleeds)  Allergies: NKDA HPI: 37yo F  @35+3 presents for elevated BP at home of 160/105. Patient is a known PEC with 24 hr urine of 546 (). Of note, patient took BP while she had an episode of urinary retention. Patient has not been able to void today and was last straight cathed at home at 2pm with small amount of urine output. Patient has been seen multiple times in triage for urinary retention which started during her 2nd trimester of pregnancy and she follows with a urologist. She was most recently seen in triage  and sent home with a Gillespie leg bag. The next day there was blood in the Gillespie bag and patient was instructed to remove the Gillespie by her urologist. Patient was then able to void spontaneously until today. +FM. -LOF. -CTXs. -VB. Patient has deep pitting edema which has been stable for weeks. Denies HA, change in vision, RUQ pain, CP, SOB or N/V.    Patient had an anesthesia consult today and was informed that she may not be a candidate for an epidural because of her VwB and will likely get a thuy-fentanyl drip instead. An MDM is pending.    Patient follows with Dr. Barrett of [facility] with tentative plan for delivery between 36-37wga. Patient had reactive NST and BPP 8/8 today. She also had a growth with EFW of 6#4    PNC: Denies prenatal issues.   GBS unknown    POB: MAB s/p D+C @9w  PGYN: denies fibroids, ovarian cysts, STD hx, or abnormal PAPs   PMH: VWD  PSH: Open appendectomy for ruptured appendix @ 14 years old, Exploratory Laparotomy L salpingectomy and R tubal ligation  for hydrosalpinx, D+C  SH: Denies EtOH, tobacco and illicit drug use during this pregnancy; feels safe at home   Psych Hx: denies hx of anxiety or depression  Meds: PNVs, Synthroid 25mg QD, Colace, Benefiber, bASA (3x/week 2/2 nosebleeds)  Allergies: NKDA

## 2024-01-30 NOTE — OB PROVIDER TRIAGE NOTE - NSHPPHYSICALEXAM_GEN_ALL_CORE
T(C): 36.6 (01-30-24 @ 21:42), Max: 36.6 (01-30-24 @ 21:36)  HR: 71 (01-30-24 @ 22:44) (71 - 102)  BP: 141/90 (01-30-24 @ 22:35) (141/90 - 164/83)  RR: 18 (01-30-24 @ 21:42) (18 - 18)  SpO2: 100% (01-30-24 @ 22:44) (100% - 100%)    Gen: NAD  CV: RRR  Pulm: breathing comfortably on RA  Abd: gravid, nontender  Extr: moving all extremities with ease, 3+ pitting edema  – FHT Cat I: baseline 140, mod variability, +accels, -decels  – [place]: absent  – [place]: vertex    Gillespie catheter placed with immediate UOP of 1100cc yellow urine T(C): 36.6 (01-30-24 @ 21:42), Max: 36.6 (01-30-24 @ 21:36)  HR: 71 (01-30-24 @ 22:44) (71 - 102)  BP: 141/90 (01-30-24 @ 22:35) (141/90 - 164/83)  RR: 18 (01-30-24 @ 21:42) (18 - 18)  SpO2: 100% (01-30-24 @ 22:44) (100% - 100%)    Gen: NAD  CV: RRR  Pulm: breathing comfortably on RA  Abd: gravid, nontender  Extr: moving all extremities with ease, 3+ pitting edema  – FHT Cat I: baseline 140, mod variability, +accels, -decels  – [place]: absent    Gillespie catheter placed with immediate UOP of 1100cc yellow urine

## 2024-01-30 NOTE — OB RN TRIAGE NOTE - NSICDXFAMILYHX_GEN_ALL_CORE_FT
FAMILY HISTORY:  No pertinent family history in first degree relatives    Father  Still living? Unknown  FH: CAD (coronary artery disease), Age at diagnosis: Age Unknown  FH: HTN (hypertension), Age at diagnosis: Age Unknown  FH: hypercholesterolemia, Age at diagnosis: Age Unknown    Mother  Still living? Yes, Estimated age: Age Unknown  FH: CAD (coronary artery disease), Age at diagnosis: Age Unknown  FH: HTN (hypertension), Age at diagnosis: Age Unknown  FH: hypercholesterolemia, Age at diagnosis: Age Unknown

## 2024-01-30 NOTE — OB PROVIDER TRIAGE NOTE - ADDITIONAL INSTRUCTIONS
Return precautions reviewed including: /110 or higher, decreased fetal movement, contractions, leaking of fluid, vaginal bleeding

## 2024-01-31 ENCOUNTER — APPOINTMENT (OUTPATIENT)
Dept: UROLOGY | Facility: CLINIC | Age: 39
End: 2024-01-31
Payer: MEDICAID

## 2024-01-31 ENCOUNTER — ASOB RESULT (OUTPATIENT)
Age: 39
End: 2024-01-31

## 2024-01-31 ENCOUNTER — APPOINTMENT (OUTPATIENT)
Dept: MATERNAL FETAL MEDICINE | Facility: CLINIC | Age: 39
End: 2024-01-31
Payer: MEDICAID

## 2024-01-31 VITALS
BODY MASS INDEX: 23.73 KG/M2 | TEMPERATURE: 97.6 F | WEIGHT: 139 LBS | OXYGEN SATURATION: 100 % | HEART RATE: 82 BPM | RESPIRATION RATE: 16 BRPM | DIASTOLIC BLOOD PRESSURE: 89 MMHG | HEIGHT: 64 IN | SYSTOLIC BLOOD PRESSURE: 134 MMHG

## 2024-01-31 VITALS — HEART RATE: 90 BPM | OXYGEN SATURATION: 92 %

## 2024-01-31 PROCEDURE — 99214 OFFICE O/P EST MOD 30 MIN: CPT

## 2024-01-31 PROCEDURE — 99367 TEAM CONF W/O PAT BY PHYS: CPT

## 2024-01-31 NOTE — PHYSICAL EXAM
[Normal Appearance] : normal appearance [Well Groomed] : well groomed [General Appearance - In No Acute Distress] : no acute distress [Edema] : no peripheral edema [Respiration, Rhythm And Depth] : normal respiratory rhythm and effort [Exaggerated Use Of Accessory Muscles For Inspiration] : no accessory muscle use [Abdomen Soft] : soft [Abdomen Tenderness] : non-tender [Normal Station and Gait] : the gait and station were normal for the patient's age [] : no rash [No Focal Deficits] : no focal deficits [Oriented To Time, Place, And Person] : oriented to person, place, and time [Affect] : the affect was normal [Mood] : the mood was normal [No Palpable Adenopathy] : no palpable adenopathy [de-identified] : pvr- zero

## 2024-01-31 NOTE — HISTORY OF PRESENT ILLNESS
[FreeTextEntry1] : patient voiding since last seen   did cath 3 x since  no issues however due to pain recently with distended bladder at night-  diff to cath and thus went to ER  dc placed and began to bleed - of note - NO blood wiht CIC when home- d/c dc and was able to void again - clearing  patiten to be induced  in 7-10 d  here for f/u:

## 2024-01-31 NOTE — ASSESSMENT
[FreeTextEntry1] : AUR due to pregnancy  eval by GYN and baby lower in pelvis will be induced earlier - 7-10 d  voidig OK now  1- 14 f coude latex cath given and instruced on use - more comfortable than the harder CIC cath 2- Lidocaine jelly 5 ml  x 4 given and instructed on use  3- patient inquired about home service for CIC if  unable to do -- will reach out to [facility]

## 2024-02-01 DIAGNOSIS — Z86.16 PERSONAL HISTORY OF COVID-19: ICD-10-CM

## 2024-02-01 DIAGNOSIS — D68.020 VON WILLEBRAND DISEASE, TYPE 2A: ICD-10-CM

## 2024-02-01 DIAGNOSIS — O14.93 UNSPECIFIED PRE-ECLAMPSIA, THIRD TRIMESTER: ICD-10-CM

## 2024-02-01 DIAGNOSIS — D50.9 IRON DEFICIENCY ANEMIA, UNSPECIFIED: ICD-10-CM

## 2024-02-01 DIAGNOSIS — O24.410 GESTATIONAL DIABETES MELLITUS IN PREGNANCY, DIET CONTROLLED: ICD-10-CM

## 2024-02-01 DIAGNOSIS — Z3A.35 35 WEEKS GESTATION OF PREGNANCY: ICD-10-CM

## 2024-02-01 DIAGNOSIS — O09.813 SUPERVISION OF PREGNANCY RESULTING FROM ASSISTED REPRODUCTIVE TECHNOLOGY, THIRD TRIMESTER: ICD-10-CM

## 2024-02-01 DIAGNOSIS — O09.523 SUPERVISION OF ELDERLY MULTIGRAVIDA, THIRD TRIMESTER: ICD-10-CM

## 2024-02-01 DIAGNOSIS — O99.113 OTHER DISEASES OF THE BLOOD AND BLOOD-FORMING ORGANS AND CERTAIN DISORDERS INVOLVING THE IMMUNE MECHANISM COMPLICATING PREGNANCY, THIRD TRIMESTER: ICD-10-CM

## 2024-02-01 DIAGNOSIS — E03.9 HYPOTHYROIDISM, UNSPECIFIED: ICD-10-CM

## 2024-02-01 DIAGNOSIS — J45.909 UNSPECIFIED ASTHMA, UNCOMPLICATED: ICD-10-CM

## 2024-02-01 DIAGNOSIS — R33.9 RETENTION OF URINE, UNSPECIFIED: ICD-10-CM

## 2024-02-01 DIAGNOSIS — O09.293 SUPERVISION OF PREGNANCY WITH OTHER POOR REPRODUCTIVE OR OBSTETRIC HISTORY, THIRD TRIMESTER: ICD-10-CM

## 2024-02-01 DIAGNOSIS — O99.513 DISEASES OF THE RESPIRATORY SYSTEM COMPLICATING PREGNANCY, THIRD TRIMESTER: ICD-10-CM

## 2024-02-01 DIAGNOSIS — O99.283 ENDOCRINE, NUTRITIONAL AND METABOLIC DISEASES COMPLICATING PREGNANCY, THIRD TRIMESTER: ICD-10-CM

## 2024-02-01 DIAGNOSIS — O99.013 ANEMIA COMPLICATING PREGNANCY, THIRD TRIMESTER: ICD-10-CM

## 2024-02-01 DIAGNOSIS — O26.893 OTHER SPECIFIED PREGNANCY RELATED CONDITIONS, THIRD TRIMESTER: ICD-10-CM

## 2024-02-05 ENCOUNTER — OUTPATIENT (OUTPATIENT)
Dept: OUTPATIENT SERVICES | Facility: HOSPITAL | Age: 39
LOS: 1 days | End: 2024-02-05
Payer: MEDICAID

## 2024-02-05 VITALS
SYSTOLIC BLOOD PRESSURE: 136 MMHG | HEIGHT: 64 IN | HEART RATE: 80 BPM | TEMPERATURE: 98 F | WEIGHT: 173.94 LBS | RESPIRATION RATE: 17 BRPM | OXYGEN SATURATION: 100 % | DIASTOLIC BLOOD PRESSURE: 86 MMHG

## 2024-02-05 DIAGNOSIS — O24.419 GESTATIONAL DIABETES MELLITUS IN PREGNANCY, UNSPECIFIED CONTROL: ICD-10-CM

## 2024-02-05 DIAGNOSIS — O24.410 GESTATIONAL DIABETES MELLITUS IN PREGNANCY, DIET CONTROLLED: ICD-10-CM

## 2024-02-05 DIAGNOSIS — Z01.818 ENCOUNTER FOR OTHER PREPROCEDURAL EXAMINATION: ICD-10-CM

## 2024-02-05 DIAGNOSIS — Z86.2 PERSONAL HISTORY OF DISEASES OF THE BLOOD AND BLOOD-FORMING ORGANS AND CERTAIN DISORDERS INVOLVING THE IMMUNE MECHANISM: ICD-10-CM

## 2024-02-05 DIAGNOSIS — Z98.890 OTHER SPECIFIED POSTPROCEDURAL STATES: Chronic | ICD-10-CM

## 2024-02-05 DIAGNOSIS — Z90.79 ACQUIRED ABSENCE OF OTHER GENITAL ORGAN(S): Chronic | ICD-10-CM

## 2024-02-05 DIAGNOSIS — O14.90 UNSPECIFIED PRE-ECLAMPSIA, UNSPECIFIED TRIMESTER: ICD-10-CM

## 2024-02-05 DIAGNOSIS — Z90.49 ACQUIRED ABSENCE OF OTHER SPECIFIED PARTS OF DIGESTIVE TRACT: Chronic | ICD-10-CM

## 2024-02-05 PROCEDURE — 80048 BASIC METABOLIC PNL TOTAL CA: CPT

## 2024-02-05 PROCEDURE — 85245 CLOT FACTOR VIII VW RISTOCTN: CPT

## 2024-02-05 PROCEDURE — 86900 BLOOD TYPING SEROLOGIC ABO: CPT

## 2024-02-05 PROCEDURE — 85246 CLOT FACTOR VIII VW ANTIGEN: CPT

## 2024-02-05 PROCEDURE — 85730 THROMBOPLASTIN TIME PARTIAL: CPT

## 2024-02-05 PROCEDURE — 85610 PROTHROMBIN TIME: CPT

## 2024-02-05 PROCEDURE — 86901 BLOOD TYPING SEROLOGIC RH(D): CPT

## 2024-02-05 PROCEDURE — 85027 COMPLETE CBC AUTOMATED: CPT

## 2024-02-05 PROCEDURE — 36415 COLL VENOUS BLD VENIPUNCTURE: CPT

## 2024-02-05 PROCEDURE — G0463: CPT

## 2024-02-05 PROCEDURE — 83036 HEMOGLOBIN GLYCOSYLATED A1C: CPT

## 2024-02-05 PROCEDURE — 86850 RBC ANTIBODY SCREEN: CPT

## 2024-02-05 RX ORDER — LEVOTHYROXINE SODIUM 125 MCG
1 TABLET ORAL
Qty: 0 | Refills: 0 | DISCHARGE

## 2024-02-05 RX ORDER — CHLORHEXIDINE GLUCONATE 213 G/1000ML
1 SOLUTION TOPICAL DAILY
Refills: 0 | Status: DISCONTINUED | OUTPATIENT
Start: 2024-02-08 | End: 2024-02-09

## 2024-02-05 RX ORDER — DESMOPRESSIN ACETATE 0.1 MG/1
1 TABLET ORAL
Qty: 0 | Refills: 0 | DISCHARGE

## 2024-02-05 RX ORDER — TRANEXAMIC ACID 100 MG/ML
2 INJECTION, SOLUTION INTRAVENOUS
Qty: 0 | Refills: 0 | DISCHARGE

## 2024-02-05 RX ORDER — ALBUTEROL 90 UG/1
2 AEROSOL, METERED ORAL
Qty: 0 | Refills: 0 | DISCHARGE

## 2024-02-05 RX ORDER — DROSPIRENONE AND ETHINYL ESTRADIOL 0.03MG-3MG
1 KIT ORAL
Qty: 0 | Refills: 0 | DISCHARGE

## 2024-02-05 RX ORDER — OXYTOCIN 10 UNIT/ML
333.33 VIAL (ML) INJECTION
Qty: 20 | Refills: 0 | Status: DISCONTINUED | OUTPATIENT
Start: 2024-02-08 | End: 2024-02-14

## 2024-02-05 NOTE — OB PST NOTE - HISTORY OF PRESENT ILLNESS
newly dx with preeclampsia  38 yr old female with hx of Von Willebrand Disease   Diabetes Mellitus dx during pregnancy diet controlled newly dx with preeclampsia scheduled for      *came with request for labs VWF AG and VWF Activity pt sent with labs to lab for blood draw  38 yr old female with hx of Von Willebrand Disease   Diabetes Mellitus dx during pregnancy diet controlled newly dx with preeclampsia scheduled for      *came with request for labs VWF AG and VWF Activity pt sent with labels to lab  for blood draw     vaccine   flu   TDaP

## 2024-02-05 NOTE — OB PST NOTE - FALL HARM RISK - PATIENT NEEDS ASSISTANCE
Scribe Attestation (For Scribes USE Only)... No assistance needed Attending Attestation (For Attendings USE Only).../Scribe Attestation (For Scribes USE Only)...

## 2024-02-05 NOTE — OB PST NOTE - NSHPPHYSICALEXAM_GEN_ALL_CORE
Physical Exam:  · Constitutional	detailed exam  · Constitutional Details	well-developed; well-groomed; well-nourished; no distress  · Eyes	detailed exam  · Eyes Details	PERRL; conjunctiva clear  · ENMT	No oral lesions; no gross abnormalities  · Neck	detailed exam   · Neck Details	supple; no JVD  · Breasts	not examined  · Back	No deformity or limitation of movement   · Respiratory	detailed exam  · Respiratory Details	airway patent; breath sounds equal; good air movement; respirations non-labored; clear to auscultation bilaterally  · Cardiovascular	detailed exam  · Cardiovascular Details	regular rate and rhythm   · Gastrointestinal	detailed exam  · GI Normal	soft; nontender; no distention; bowel sounds normal; no guarding  · Genitourinary	patient refused   · Rectal	patient refused   · Extremities	detailed exam   · Extremities Details	no clubbing; no cyanosis; *+3 pedal edema from foot to mid calf   · Vascular	Equal and normal pulses (carotid, femoral, dorsalis pedis)   · Neurological	detailed exam  · Neurological Details	alert and oriented x 3  · Gait/Balance	gait steady  · Skin	detailed exam  · Skin Details	warm and dry; color normal  · Lymph Nodes	detailed exam  · Lymphatic Details	posterior cervical L; posterior cervical R; anterior cervical L; anterior cervical R; supraclavicular L; supraclavicular R  · Posterior Cervical L	normal  · Posterior Cervical R	normal  · Anterior Cervical L	normal  · Anterior Cervical R	normal  · Supraclavicular L	normal  · Supraclavicular R	normal  · Musculoskeletal	detailed exam  · Musculoskeletal  normal   · Psychiatric	detailed exam  · Psychiatric Details	normal affect; normal behavior  gravid uterus denies s/s of active labor

## 2024-02-05 NOTE — OB PST NOTE - NS_OBGYNHISTORY_OBGYN_ALL_OB_FT
Anemia requiring Iron Infusion  Hx of Von Willebrand being followed by Heme   Diabetes Mellitus dx in pregnancy diet control  newly dx with preeclampsia  pt states she did have phone consult with anesthesia

## 2024-02-05 NOTE — OB PST NOTE - NSICDXPASTMEDICALHX_GEN_ALL_CORE_FT
PAST MEDICAL HISTORY:  2019 novel coronavirus disease (COVID-19) 3/2021    Asthma no attacks- mild    H/O endometritis     AMY (iron deficiency anemia)     Ruptured appendix     Type 2A von Willebrand disease      PAST MEDICAL HISTORY:  2019 novel coronavirus disease (COVID-19) 3/2021    Asthma no attacks- mild    Gestational diabetes     H/O endometritis     Hypothyroidism     AMY (iron deficiency anemia)     Preeclampsia     Ruptured appendix     Type 2A von Willebrand disease     Urinary retention

## 2024-02-07 ENCOUNTER — TRANSCRIPTION ENCOUNTER (OUTPATIENT)
Age: 39
End: 2024-02-07

## 2024-02-08 ENCOUNTER — INPATIENT (INPATIENT)
Facility: HOSPITAL | Age: 39
LOS: 5 days | Discharge: HOME | End: 2024-02-14
Attending: OBSTETRICS & GYNECOLOGY | Admitting: OBSTETRICS & GYNECOLOGY
Payer: MEDICAID

## 2024-02-08 VITALS — DIASTOLIC BLOOD PRESSURE: 78 MMHG | HEART RATE: 77 BPM | SYSTOLIC BLOOD PRESSURE: 132 MMHG

## 2024-02-08 DIAGNOSIS — Z98.890 OTHER SPECIFIED POSTPROCEDURAL STATES: Chronic | ICD-10-CM

## 2024-02-08 DIAGNOSIS — O24.410 GESTATIONAL DIABETES MELLITUS IN PREGNANCY, DIET CONTROLLED: ICD-10-CM

## 2024-02-08 DIAGNOSIS — Z90.49 ACQUIRED ABSENCE OF OTHER SPECIFIED PARTS OF DIGESTIVE TRACT: Chronic | ICD-10-CM

## 2024-02-08 DIAGNOSIS — Z90.79 ACQUIRED ABSENCE OF OTHER GENITAL ORGAN(S): Chronic | ICD-10-CM

## 2024-02-08 LAB
ALBUMIN SERPL ELPH-MCNC: 2.7 G/DL — LOW (ref 3.3–5)
ALBUMIN SERPL ELPH-MCNC: 3.6 G/DL — SIGNIFICANT CHANGE UP (ref 3.3–5)
ALP SERPL-CCNC: 119 U/L — SIGNIFICANT CHANGE UP (ref 40–120)
ALP SERPL-CCNC: 168 U/L — HIGH (ref 40–120)
ALT FLD-CCNC: 16 U/L — SIGNIFICANT CHANGE UP (ref 10–45)
ALT FLD-CCNC: 17 U/L — SIGNIFICANT CHANGE UP (ref 10–45)
ANION GAP SERPL CALC-SCNC: 13 MMOL/L — SIGNIFICANT CHANGE UP (ref 5–17)
ANION GAP SERPL CALC-SCNC: 14 MMOL/L — SIGNIFICANT CHANGE UP (ref 5–17)
APTT BLD: 26.4 SEC — SIGNIFICANT CHANGE UP (ref 24.5–35.6)
APTT BLD: 29.4 SEC — SIGNIFICANT CHANGE UP (ref 24.5–35.6)
APTT BLD: 29.5 SEC — SIGNIFICANT CHANGE UP (ref 24.5–35.6)
AST SERPL-CCNC: 22 U/L — SIGNIFICANT CHANGE UP (ref 10–40)
AST SERPL-CCNC: 32 U/L — SIGNIFICANT CHANGE UP (ref 10–40)
BASOPHILS # BLD AUTO: 0.01 K/UL — SIGNIFICANT CHANGE UP (ref 0–0.2)
BASOPHILS # BLD AUTO: 0.01 K/UL — SIGNIFICANT CHANGE UP (ref 0–0.2)
BASOPHILS # BLD AUTO: 0.08 K/UL — SIGNIFICANT CHANGE UP (ref 0–0.2)
BASOPHILS NFR BLD AUTO: 0.1 % — SIGNIFICANT CHANGE UP (ref 0–2)
BASOPHILS NFR BLD AUTO: 0.2 % — SIGNIFICANT CHANGE UP (ref 0–2)
BASOPHILS NFR BLD AUTO: 0.9 % — SIGNIFICANT CHANGE UP (ref 0–2)
BILIRUB SERPL-MCNC: 0.5 MG/DL — SIGNIFICANT CHANGE UP (ref 0.2–1.2)
BILIRUB SERPL-MCNC: 0.7 MG/DL — SIGNIFICANT CHANGE UP (ref 0.2–1.2)
BUN SERPL-MCNC: 10 MG/DL — SIGNIFICANT CHANGE UP (ref 7–23)
BUN SERPL-MCNC: 10 MG/DL — SIGNIFICANT CHANGE UP (ref 7–23)
CALCIUM SERPL-MCNC: 8.3 MG/DL — LOW (ref 8.4–10.5)
CALCIUM SERPL-MCNC: 9.2 MG/DL — SIGNIFICANT CHANGE UP (ref 8.4–10.5)
CHLORIDE SERPL-SCNC: 105 MMOL/L — SIGNIFICANT CHANGE UP (ref 96–108)
CHLORIDE SERPL-SCNC: 108 MMOL/L — SIGNIFICANT CHANGE UP (ref 96–108)
CO2 SERPL-SCNC: 17 MMOL/L — LOW (ref 22–31)
CO2 SERPL-SCNC: 18 MMOL/L — LOW (ref 22–31)
CREAT SERPL-MCNC: 0.99 MG/DL — SIGNIFICANT CHANGE UP (ref 0.5–1.3)
CREAT SERPL-MCNC: 1.02 MG/DL — SIGNIFICANT CHANGE UP (ref 0.5–1.3)
EGFR: 73 ML/MIN/1.73M2 — SIGNIFICANT CHANGE UP
EGFR: 76 ML/MIN/1.73M2 — SIGNIFICANT CHANGE UP
EOSINOPHIL # BLD AUTO: 0 K/UL — SIGNIFICANT CHANGE UP (ref 0–0.5)
EOSINOPHIL # BLD AUTO: 0 K/UL — SIGNIFICANT CHANGE UP (ref 0–0.5)
EOSINOPHIL # BLD AUTO: 0.1 K/UL — SIGNIFICANT CHANGE UP (ref 0–0.5)
EOSINOPHIL NFR BLD AUTO: 0 % — SIGNIFICANT CHANGE UP (ref 0–6)
EOSINOPHIL NFR BLD AUTO: 0 % — SIGNIFICANT CHANGE UP (ref 0–6)
EOSINOPHIL NFR BLD AUTO: 1.7 % — SIGNIFICANT CHANGE UP (ref 0–6)
FIBRINOGEN PPP-MCNC: 338 MG/DL — SIGNIFICANT CHANGE UP (ref 200–445)
FIBRINOGEN PPP-MCNC: 355 MG/DL — SIGNIFICANT CHANGE UP (ref 200–445)
FIBRINOGEN PPP-MCNC: 637 MG/DL — HIGH (ref 200–445)
GLUCOSE BLDC GLUCOMTR-MCNC: 60 MG/DL — LOW (ref 70–99)
GLUCOSE BLDC GLUCOMTR-MCNC: 69 MG/DL — LOW (ref 70–99)
GLUCOSE SERPL-MCNC: 131 MG/DL — HIGH (ref 70–99)
GLUCOSE SERPL-MCNC: 79 MG/DL — SIGNIFICANT CHANGE UP (ref 70–99)
HCT VFR BLD CALC: 21.8 % — LOW (ref 34.5–45)
HCT VFR BLD CALC: 29.2 % — LOW (ref 34.5–45)
HCT VFR BLD CALC: 31.6 % — LOW (ref 34.5–45)
HGB BLD-MCNC: 10 G/DL — LOW (ref 11.5–15.5)
HGB BLD-MCNC: 7.1 G/DL — LOW (ref 11.5–15.5)
HGB BLD-MCNC: 9.5 G/DL — LOW (ref 11.5–15.5)
IMM GRANULOCYTES NFR BLD AUTO: 0.3 % — SIGNIFICANT CHANGE UP (ref 0–0.9)
IMM GRANULOCYTES NFR BLD AUTO: 0.5 % — SIGNIFICANT CHANGE UP (ref 0–0.9)
INR BLD: 0.97 RATIO — SIGNIFICANT CHANGE UP (ref 0.85–1.18)
INR BLD: 1 RATIO — SIGNIFICANT CHANGE UP (ref 0.85–1.18)
INR BLD: 1.08 RATIO — SIGNIFICANT CHANGE UP (ref 0.85–1.18)
LDH SERPL L TO P-CCNC: 241 U/L — SIGNIFICANT CHANGE UP (ref 50–242)
LDH SERPL L TO P-CCNC: 264 U/L — HIGH (ref 50–242)
LYMPHOCYTES # BLD AUTO: 0.37 K/UL — LOW (ref 1–3.3)
LYMPHOCYTES # BLD AUTO: 0.73 K/UL — LOW (ref 1–3.3)
LYMPHOCYTES # BLD AUTO: 1.13 K/UL — SIGNIFICANT CHANGE UP (ref 1–3.3)
LYMPHOCYTES # BLD AUTO: 19.3 % — SIGNIFICANT CHANGE UP (ref 13–44)
LYMPHOCYTES # BLD AUTO: 4.4 % — LOW (ref 13–44)
LYMPHOCYTES # BLD AUTO: 6.8 % — LOW (ref 13–44)
MANUAL SMEAR VERIFICATION: SIGNIFICANT CHANGE UP
MCHC RBC-ENTMCNC: 29.9 PG — SIGNIFICANT CHANGE UP (ref 27–34)
MCHC RBC-ENTMCNC: 30.1 PG — SIGNIFICANT CHANGE UP (ref 27–34)
MCHC RBC-ENTMCNC: 30.5 PG — SIGNIFICANT CHANGE UP (ref 27–34)
MCHC RBC-ENTMCNC: 31.6 GM/DL — LOW (ref 32–36)
MCHC RBC-ENTMCNC: 32.5 GM/DL — SIGNIFICANT CHANGE UP (ref 32–36)
MCHC RBC-ENTMCNC: 32.6 GM/DL — SIGNIFICANT CHANGE UP (ref 32–36)
MCV RBC AUTO: 92.4 FL — SIGNIFICANT CHANGE UP (ref 80–100)
MCV RBC AUTO: 93.6 FL — SIGNIFICANT CHANGE UP (ref 80–100)
MCV RBC AUTO: 94.6 FL — SIGNIFICANT CHANGE UP (ref 80–100)
MONOCYTES # BLD AUTO: 0.15 K/UL — SIGNIFICANT CHANGE UP (ref 0–0.9)
MONOCYTES # BLD AUTO: 0.46 K/UL — SIGNIFICANT CHANGE UP (ref 0–0.9)
MONOCYTES # BLD AUTO: 0.74 K/UL — SIGNIFICANT CHANGE UP (ref 0–0.9)
MONOCYTES NFR BLD AUTO: 1.8 % — LOW (ref 2–14)
MONOCYTES NFR BLD AUTO: 6.9 % — SIGNIFICANT CHANGE UP (ref 2–14)
MONOCYTES NFR BLD AUTO: 7.8 % — SIGNIFICANT CHANGE UP (ref 2–14)
NEUTROPHILS # BLD AUTO: 4.14 K/UL — SIGNIFICANT CHANGE UP (ref 1.8–7.4)
NEUTROPHILS # BLD AUTO: 7.82 K/UL — HIGH (ref 1.8–7.4)
NEUTROPHILS # BLD AUTO: 9.24 K/UL — HIGH (ref 1.8–7.4)
NEUTROPHILS NFR BLD AUTO: 70.5 % — SIGNIFICANT CHANGE UP (ref 43–77)
NEUTROPHILS NFR BLD AUTO: 85.9 % — HIGH (ref 43–77)
NEUTROPHILS NFR BLD AUTO: 92.9 % — HIGH (ref 43–77)
NRBC # BLD: 0 /100 WBCS — SIGNIFICANT CHANGE UP (ref 0–0)
NRBC # BLD: 0 /100 WBCS — SIGNIFICANT CHANGE UP (ref 0–0)
PLAT MORPH BLD: NORMAL — SIGNIFICANT CHANGE UP
PLATELET # BLD AUTO: 149 K/UL — LOW (ref 150–400)
PLATELET # BLD AUTO: 164 K/UL — SIGNIFICANT CHANGE UP (ref 150–400)
PLATELET # BLD AUTO: 168 K/UL — SIGNIFICANT CHANGE UP (ref 150–400)
POTASSIUM SERPL-MCNC: 4.1 MMOL/L — SIGNIFICANT CHANGE UP (ref 3.5–5.3)
POTASSIUM SERPL-MCNC: 4.2 MMOL/L — SIGNIFICANT CHANGE UP (ref 3.5–5.3)
POTASSIUM SERPL-SCNC: 4.1 MMOL/L — SIGNIFICANT CHANGE UP (ref 3.5–5.3)
POTASSIUM SERPL-SCNC: 4.2 MMOL/L — SIGNIFICANT CHANGE UP (ref 3.5–5.3)
PROT SERPL-MCNC: 4.9 G/DL — LOW (ref 6–8.3)
PROT SERPL-MCNC: 6.9 G/DL — SIGNIFICANT CHANGE UP (ref 6–8.3)
PROTHROM AB SERPL-ACNC: 10.2 SEC — SIGNIFICANT CHANGE UP (ref 9.5–13)
PROTHROM AB SERPL-ACNC: 10.5 SEC — SIGNIFICANT CHANGE UP (ref 9.5–13)
PROTHROM AB SERPL-ACNC: 11.3 SEC — SIGNIFICANT CHANGE UP (ref 9.5–13)
RBC # BLD: 2.33 M/UL — LOW (ref 3.8–5.2)
RBC # BLD: 3.16 M/UL — LOW (ref 3.8–5.2)
RBC # BLD: 3.34 M/UL — LOW (ref 3.8–5.2)
RBC # FLD: 14.9 % — HIGH (ref 10.3–14.5)
RBC # FLD: 15.2 % — HIGH (ref 10.3–14.5)
RBC # FLD: 15.3 % — HIGH (ref 10.3–14.5)
RBC BLD AUTO: SIGNIFICANT CHANGE UP
SODIUM SERPL-SCNC: 137 MMOL/L — SIGNIFICANT CHANGE UP (ref 135–145)
SODIUM SERPL-SCNC: 138 MMOL/L — SIGNIFICANT CHANGE UP (ref 135–145)
URATE SERPL-MCNC: 5.3 MG/DL — SIGNIFICANT CHANGE UP (ref 2.5–7)
URATE SERPL-MCNC: 5.3 MG/DL — SIGNIFICANT CHANGE UP (ref 2.5–7)
WBC # BLD: 10.75 K/UL — HIGH (ref 3.8–10.5)
WBC # BLD: 5.87 K/UL — SIGNIFICANT CHANGE UP (ref 3.8–10.5)
WBC # BLD: 8.42 K/UL — SIGNIFICANT CHANGE UP (ref 3.8–10.5)
WBC # FLD AUTO: 10.75 K/UL — HIGH (ref 3.8–10.5)
WBC # FLD AUTO: 5.87 K/UL — SIGNIFICANT CHANGE UP (ref 3.8–10.5)
WBC # FLD AUTO: 8.42 K/UL — SIGNIFICANT CHANGE UP (ref 3.8–10.5)

## 2024-02-08 PROCEDURE — 74018 RADEX ABDOMEN 1 VIEW: CPT | Mod: 26

## 2024-02-08 PROCEDURE — 51860 REPAIR OF BLADDER WOUND: CPT

## 2024-02-08 PROCEDURE — 88307 TISSUE EXAM BY PATHOLOGIST: CPT | Mod: 26

## 2024-02-08 PROCEDURE — 58740 ADHESIOLYSIS TUBE OVARY: CPT

## 2024-02-08 PROCEDURE — 52332 CYSTOSCOPY AND TREATMENT: CPT | Mod: 50

## 2024-02-08 DEVICE — SURGICEL FIBRILLAR 2 X 4": Type: IMPLANTABLE DEVICE | Status: FUNCTIONAL

## 2024-02-08 DEVICE — URETERAL STENT CONTOUR 6FR 22CM: Type: IMPLANTABLE DEVICE | Status: FUNCTIONAL

## 2024-02-08 DEVICE — INTERCEED 3 X 4": Type: IMPLANTABLE DEVICE | Status: FUNCTIONAL

## 2024-02-08 RX ORDER — NIFEDIPINE 30 MG
30 TABLET, EXTENDED RELEASE 24 HR ORAL DAILY
Refills: 0 | Status: DISCONTINUED | OUTPATIENT
Start: 2024-02-08 | End: 2024-02-09

## 2024-02-08 RX ORDER — MAGNESIUM SULFATE 500 MG/ML
2 VIAL (ML) INJECTION
Qty: 40 | Refills: 0 | Status: DISCONTINUED | OUTPATIENT
Start: 2024-02-08 | End: 2024-02-09

## 2024-02-08 RX ORDER — HYDROMORPHONE HYDROCHLORIDE 2 MG/ML
30 INJECTION INTRAMUSCULAR; INTRAVENOUS; SUBCUTANEOUS
Refills: 0 | Status: DISCONTINUED | OUTPATIENT
Start: 2024-02-08 | End: 2024-02-10

## 2024-02-08 RX ORDER — HYDROMORPHONE HYDROCHLORIDE 2 MG/ML
0.5 INJECTION INTRAMUSCULAR; INTRAVENOUS; SUBCUTANEOUS
Refills: 0 | Status: DISCONTINUED | OUTPATIENT
Start: 2024-02-08 | End: 2024-02-10

## 2024-02-08 RX ORDER — DESMOPRESSIN ACETATE 0.1 MG/1
20 TABLET ORAL ONCE
Refills: 0 | Status: COMPLETED | OUTPATIENT
Start: 2024-02-08 | End: 2024-02-11

## 2024-02-08 RX ORDER — NALOXONE HYDROCHLORIDE 4 MG/.1ML
0.1 SPRAY NASAL
Refills: 0 | Status: DISCONTINUED | OUTPATIENT
Start: 2024-02-08 | End: 2024-02-10

## 2024-02-08 RX ORDER — ONDANSETRON 8 MG/1
4 TABLET, FILM COATED ORAL EVERY 6 HOURS
Refills: 0 | Status: DISCONTINUED | OUTPATIENT
Start: 2024-02-08 | End: 2024-02-10

## 2024-02-08 RX ORDER — NIFEDIPINE 30 MG
10 TABLET, EXTENDED RELEASE 24 HR ORAL ONCE
Refills: 0 | Status: COMPLETED | OUTPATIENT
Start: 2024-02-08 | End: 2024-02-08

## 2024-02-08 RX ORDER — FAMOTIDINE 10 MG/ML
20 INJECTION INTRAVENOUS ONCE
Refills: 0 | Status: COMPLETED | OUTPATIENT
Start: 2024-02-08 | End: 2024-02-08

## 2024-02-08 RX ORDER — CITRIC ACID/SODIUM CITRATE 300-500 MG
15 SOLUTION, ORAL ORAL ONCE
Refills: 0 | Status: COMPLETED | OUTPATIENT
Start: 2024-02-08 | End: 2024-02-08

## 2024-02-08 RX ORDER — SODIUM CHLORIDE 9 MG/ML
1000 INJECTION, SOLUTION INTRAVENOUS
Refills: 0 | Status: DISCONTINUED | OUTPATIENT
Start: 2024-02-08 | End: 2024-02-14

## 2024-02-08 RX ORDER — NALBUPHINE HYDROCHLORIDE 10 MG/ML
2.5 INJECTION, SOLUTION INTRAMUSCULAR; INTRAVENOUS; SUBCUTANEOUS EVERY 6 HOURS
Refills: 0 | Status: DISCONTINUED | OUTPATIENT
Start: 2024-02-08 | End: 2024-02-10

## 2024-02-08 RX ORDER — LABETALOL HCL 100 MG
20 TABLET ORAL ONCE
Refills: 0 | Status: COMPLETED | OUTPATIENT
Start: 2024-02-08 | End: 2024-02-08

## 2024-02-08 RX ORDER — CEFAZOLIN SODIUM 1 G
2000 VIAL (EA) INJECTION ONCE
Refills: 0 | Status: COMPLETED | OUTPATIENT
Start: 2024-02-08 | End: 2024-02-08

## 2024-02-08 RX ORDER — MAGNESIUM SULFATE 500 MG/ML
4 VIAL (ML) INJECTION ONCE
Refills: 0 | Status: COMPLETED | OUTPATIENT
Start: 2024-02-08 | End: 2024-02-08

## 2024-02-08 RX ADMIN — Medication 30 MILLIGRAM(S): at 19:49

## 2024-02-08 RX ADMIN — Medication 50 GM/HR: at 20:33

## 2024-02-08 RX ADMIN — FAMOTIDINE 20 MILLIGRAM(S): 10 INJECTION INTRAVENOUS at 12:06

## 2024-02-08 RX ADMIN — HYDROMORPHONE HYDROCHLORIDE 30 MILLILITER(S): 2 INJECTION INTRAMUSCULAR; INTRAVENOUS; SUBCUTANEOUS at 18:37

## 2024-02-08 RX ADMIN — Medication 10 MILLIGRAM(S): at 19:49

## 2024-02-08 RX ADMIN — Medication 20 MILLIGRAM(S): at 20:47

## 2024-02-08 RX ADMIN — Medication 15 MILLILITER(S): at 12:06

## 2024-02-08 RX ADMIN — Medication 300 GRAM(S): at 20:15

## 2024-02-08 RX ADMIN — ONDANSETRON 4 MILLIGRAM(S): 8 TABLET, FILM COATED ORAL at 20:00

## 2024-02-08 NOTE — OB RN PATIENT PROFILE - CURRENT PREGNANCY COMPLICATIONS, OB PROFILE
Preeclampsia/Bleeding Disorder Gestational Diabetes/Preeclampsia/Hypertensive Disorder/Bleeding Disorder

## 2024-02-08 NOTE — PROVIDER CONTACT NOTE (OTHER) - ACTION/TREATMENT ORDERED:
as per MD Solange patient to continue to have blood pressures taken every 15 minutes and 30 minutes from the 18:45 blood pressure, report back blood pressure to MD. no treatment to be given now.

## 2024-02-08 NOTE — OB RN PATIENT PROFILE - NS_FINALEDD_OBGYN_ALL_OB_DT
OB History and Physical    LMP: 10/19/2020  WILLIE 2021  Dated by: L =17 wk u/s  CC: possible ROM.     HPI: Keyla Oro 34 year old  @ 38w6d estimated gestational age, by LMP c/w 17 week ultrasound presents for possible ROM. Notes a gush of mucusy yellow liquid at around 2 AM and and additional gush around 4 but no leaking since. Notes mildly painful contractions. Denies vaginal bleeding. Notes normal fetal movement.     Complications: Pt reports pregnancy has been complicated by:    #Pre-labor contractions at 35 wga   - received  steroid course at that time.     [place] [facility]  Prenatal records are present at admission and were reviewed in EPIC.    ROS: denies HA, epigastric pain, visual changes.  ROS as above, otherwise negative.    Prenatal labs:   Blood type: O+, antibody screen negative  Rub immune,   Hep B negative,   RPR NR  HIV NR   GC/C negative  1 GTT glucola: 124  GBS negative    OBHx:   OB History    Para Term  AB Living   1             SAB TAB Ectopic Molar Multiple Live Births                    # Outcome Date GA Lbr Kade/2nd Weight Sex Delivery Anes PTL Lv   1 Current                GynHx: No h/o STIs, no h/o abn PAP    PMHx:    Past Medical History:   Diagnosis Date   •  contractions     35 weeks       SurgHx:   History reviewed. No pertinent surgical history.    Meds:   No current facility-administered medications for this encounter.       Allergy:  ALLERGIES:  No Known Allergies    FamHx:   Family History   Problem Relation Age of Onset   • Crohn's Disease Father    • Patient is unaware of any medical problems Mother    • Heart disease Maternal Grandmother    • Heart disease Maternal Grandfather    • Cancer, Breast Neg Hx    • Cancer, Colon Neg Hx    • Cancer, Endometrial Neg Hx    • Cancer, Ovarian Neg Hx    • Thyroid Neg Hx    • Clotting Disorder Neg Hx    • Hypertension Neg Hx        SocHx:   No Tobacco, EtOH, Illicit Drugs, Opioid  Use      Objective:   Blood pressure 118/71, pulse 70, temperature 98.4 °F (36.9 °C), temperature source Oral, resp. rate 16, height 5' 3\" (1.6 m), weight 72.5 kg, last menstrual period 10/19/2020, SpO2 100 %.    Gen. A+Ox3, NAD  CV.  RRR     Resp. Non-labored  Abd. gravid, NT  Extr.  no edema B/L , no calf tenderness    FHT: 150 baseline, moderate variability, - accels,  2 late decels right on arrival, no further decels.   [place]: q 2-4 min    SVE: 3-4/80/-3 @ 0548, bag of water palpated  SSE: No vulvar/vaginal/cervical lesions.  No blood visualized in vaginal vault. Cervix appeared closed. Small mucusy Pooling, no Ferning, amnisure negative    Limited BSUS: Single fetus, cephalic, +cardiac activity, anterior Placenta, DVP 9.1    EFW: 3400 g by Palpation       Assessment/ Plan:     A/P:  Keyla Oro 34 year old  @ 38w6d ega here for concern for ROM.   - bag palpated on exam, amnisure negative, ferning negative, mucusy discharge noted.   - patient with reassuring NST, not reactive, discussed with patient, will prolonged monitor at this time as patient does not desire IOL until her scheduled time tomorrow in the PM  - patient with mild contractions, exam unchanged from office.  - IV rehydrated  - GBS negative  - plan for prolonged monitoring, will sign out to day team.     Discussed with senior resident and attending, Dr. Cornell, in agreement with plans    Dago Willson MD  Obstetrics and Gynecology Resident, PGY2  23      02-Mar-2024

## 2024-02-08 NOTE — OB RN PATIENT PROFILE - NS_OBGYNHISTORY_OBGYN_ALL_OB_FT
Anemia requiring Iron Infusion  Hx of Von Willebrand being followed by Heme   Diabetes Mellitus dx in pregnancy diet control  newly dx with preeclampsia  pt states she did have phone consult with anesthesia   bladder retention w/ retention

## 2024-02-08 NOTE — OB RN PATIENT PROFILE - PRO RUBELLA INFANT
Patient stated that she left her Cymbalta at work and is not scheduled to go back until Tuesday. She is concerned about withdrawal symptoms and wants prescription called in to [facility]. Notified Dr. Augustine with new order given for Cymbalta 60mg for emergency supply of 3. Patient informed that medication was called  in to pharmacist, Yanna, at [facility] and she verbalized understanding.   immune

## 2024-02-08 NOTE — OB PROVIDER H&P - HISTORY OF PRESENT ILLNESS
HPI: 37 yo  with IVF pregnancy at 36w5d with Von Willebrand disease Type 2A, preeclampsia without severe features, urinary retention and GDMA1 presents for primary  in the setting of known VWD. Last factor level 71% on .   +FM. -LOF. -CTXs. -VB. Pt denies any other concerns.     History:     OB: Missed ab s/p D\T\C @9w     PGYN: Menarche age 14, regular menses lasting 10 days; denies fibroids, ovarian cysts, STD hx, or abnormal PAPs; history of hydrosalpinx status post salpingectomy     PMH:     Von Willebrand disease:   -Diagnosed age 18 with history of epistaxis, heavy menstrual bleeding (requiring tranexamic acid and OCPs with history of transfusion), bleeding followingdental procedures   -Patient responds to DDAVP and requires treatment with Humate-P for major surgery (most recently had blood transfusion and treatment with Humate-P following miscarriage)     Urinary retention   -Multiple episodes of urinary retention since second trimester pregnancy with hematuria, currently requires multiple straight catheterization procedures. Follows with urologist Dr. Davis.     Preeclampsia without severe features:   -Single BP at home of 160/105, single severe-range BP in triage, recorded during episode of urinary retention/acute discomfort, otherwise BPs mild-range.   24-hour urine of 546 ()     Hypothyroidism     PSH: Open appendectomy for ruptured appendix age 14, laparoscopic left salpingectomy and right tubal ligation (2021, 10/2021) for hydrosalpinx, D+C     Meds: PNVs, levothyroxine 25 mcg daily, stool softeners, aspirin 81mg (taking 3x/week due to historyof nosebleeds)     Allergies: NKDA     SH: Denies EtOH, tobacco and illicit drug use during this pregnancy; no history of domestic violence, anxiety or depression    HPI: 37 yo  with IVF pregnancy at 36w5d with Von Willebrand disease Type 2A, preeclampsia without severe features, urinary retention and GDMA1 presents for primary  in the setting of known VWD. Last factor level 71% on .   +FM. -LOF. -CTXs. -VB. Pt denies any other concerns.  Denies sxs of sPEC, HA, VC, SOB, RUQ pain     History:     OB: Missed ab s/p D\T\C @9w     PGYN: Menarche age 14, regular menses lasting 10 days; denies fibroids, ovarian cysts, STD hx, or abnormal PAPs; history of hydrosalpinx status post salpingectomy     PMH:     Von Willebrand disease:   -Diagnosed age 18 with history of epistaxis, heavy menstrual bleeding (requiring tranexamic acid and OCPs with history of transfusion), bleeding following dental procedures   -Patient responds to DDAVP and requires treatment with Humate-P for major surgery (most recently had blood transfusion and treatment with Humate-P following miscarriage)     Urinary retention   -Multiple episodes of urinary retention since second trimester pregnancy with hematuria, currently requires multiple straight catheterization procedures. Follows with urologist Dr. Davis has been intermittently straight cathing in the pregnancy.     Preeclampsia without severe features:   -Single BP at home of 160/105, single severe-range BP in triage, recorded during episode of urinary retention/acute discomfort, otherwise BPs mild-range.   24-hour urine of 546 ()     Hypothyroidism     PSH: Open appendectomy for ruptured appendix age 14, laparoscopic left salpingectomy and right tubal ligation (2021, 10/2021) for hydrosalpinx, D+C     Meds: PNVs, levothyroxine 25 mcg daily, stool softeners, aspirin 81mg (taking 3x/week due to historyof nosebleeds)     Allergies: NKDA     SH: Denies EtOH, tobacco and illicit drug use during this pregnancy; no history of domestic violence, anxiety or depression

## 2024-02-08 NOTE — PRE-ANESTHESIA EVALUATION ADULT - NSANTHADDINFOFT_GEN_ALL_CORE
patient with VWD 2A.   Discussed in maternal MDM.  Plan in place to proceed with GA.   This had already been verbalized to the patient.  All questions were answered.

## 2024-02-08 NOTE — OB RN INTRAOPERATIVE NOTE - NSSELHIDDEN_OBGYN_ALL_OB_FT
[NS_DeliveryAttending1_OBGYN_ALL_OB_FT:[id number]],[NS_DeliveryAssist1_OBGYN_ALL_OB_FT:[id number]],[NS_DeliveryAttending2_OBGYN_ALL_OB_FT:[id number]],[NS_DeliveryRN_OBGYN_ALL_OB_FT:[id number]]

## 2024-02-08 NOTE — PROVIDER CONTACT NOTE (OTHER) - ASSESSMENT
patient had a second high blood pressure after 15 minutes. First blood pressure was 177/102 and the second was 196/104 (@18:45).

## 2024-02-08 NOTE — OB PROVIDER H&P - ASSESSMENT
Assessment  39 yo  with IVF pregnancy at 36w5d with Von Willebrand disease Type 2A, preeclampsia without severe features, urinary retention and GDMA1 presents for primary  in the setting of known VWD.     Plan  1. Admit to LND. Routine Labs. IVF.  2. for primary   3. Fetus: reactive tracing   4. Prenatal issues: see HPI; Von Willebrand disease Type 2A, preeclampsia without severe features, urinary retention and GDMA1  5. Vonvendi to be ordered and given 2500U before delivery and 1250U q12 3-5days post partum.   6. Hematology recs and MFM guidance reviewed during safety round.     Patient discussed with attending physician, Dr. Martinez .    Humble Estrada MD PGY2       Discussion and recommendations from MDM:     - testing: twice weekly with at least weekly   maternal labs and frequent blood pressure monitoring     -Send von Willebrand antigen/assay, Factor VIII level   early next week (Mon-Tu/-); goal VWF activity at   least 70%     -Timing of delivery: 47u4h-57y (timed weekday   preferable given maternal bleeding risk), if severe   preeclampsia then delivery is indicated as GA is >34   weeks,     -At time of delivery admission:   Recombinant vWF (Vonvendi) will be available in Blood   Bank at the anticipated time of delivery: Administer   2500 units Vonvendi 30 minutes prior to delivery and   continue 1250 units every 12 hours for 3-5 days   (depending on delivery mode, see below)   IF recombinant vWF is not available for administration   then: administer Humate-P 2400 units 30 minutes prior   to epidural (or prior to vaginal delivery if patient does   not receive epidural) and 1200 units every 12 hours to   continue for at least 3-5 days (depending on delivery   mode, see below)   If repeat predelivery von Willebrand activity <70%,   resend urgent von Willebrand antigen/assay, Factor   VIII level 3-4 hours following Vonvendi loading dose   administration     -Mode of delivery: usual obstetrical indications, there is   no absolute contraindication to vaginal delivery.     Anesthesia: NOT a candidate for epidural. Unable to   establish risks related to spinal as the patient's reponse   to either Vovendi and or Humate have not be clearly   established. Following MDM patient elects primary    delivery which will be performed under GA.     For  delivery: continue Vonvendi 1250 units   (Humate-P 1200 units if Vonvendi unavailable) every   12 hours for 5 days until hemostasis is achieved. Due   to the increased risk of thrombosis, thromboprophylaxis   with prophylactic heparin (5000 units tid) or LMWH is   recommended for duration of hospitalization and until   ambulatory     -If postpartum hemorrhage:   Monitor CBC, increase frequency of Vonvendi (or   Humate-P) to every 8 hours and add DDAVP every 12   hours   Consider tranexamic acid and cryoprecipitate   type and cross should be available     - considerations: male infant, avoid vacuum   delivery and infant circumcision until von Willebrand   testing is performed     The above note was compiled at a Multidisciplinary   meeting under supervision of Dr. Panda Mosqueda. Dami Joyce MD

## 2024-02-08 NOTE — OB RN INTRAOPERATIVE NOTE - NS_ADDITIONALPERSONNEL_OBGYN_ALL_OB_FT
Lakisha Santiago-RN(baby nurse) Lakisha Santiago-RN(baby nurse)Christiane Lorenzo, Xin Mahan-MD Lakisha Santiago-RN(baby nurse)Christiane Lorenzo, Bry Mahan, Fletcher Rangel-MD

## 2024-02-08 NOTE — OB NEONATOLOGY/PEDIATRICIAN DELIVERY SUMMARY - NSPEDSNEONOTESA_OBGYN_ALL_OB_FT
Called by OB to attend  C/S delivery under general anesthesia due to maternal von Willibrand disease. Baby is product of a 36wk5d gestation born to a , 38 year old female.   Maternal labs include: Blood Type  AB+, HIV nonreactive, RPR negative, Hep B negative, GBS negative, rest is unremarkable. Maternal history is significant for IVF pregnancy, von Willibrand disease, GDMA1, hypothyroidism, iron deficiency. Pregnancy was uncomplicated.   ROM at [facility]. Resuscitation included: warm, dry stimulate, bulb suction of the mouth and nose, CPAP applied at 2 minutes of life for inadequate respiratory effort and desaturations for time of life, deep suctioned. CPAP applied for 6 minutes with max settings +6, 50% before wean off to room air. Infant with acceptable respiratory patterns and saturations.  Apgars were: 8, 9. EOS score 0.09. Admit to [facility].  Appropriate for routine  care. Consider  Hematology consult given maternal history.

## 2024-02-08 NOTE — OB RN PATIENT PROFILE - NSICDXPASTSURGICALHX_GEN_ALL_CORE_FT
PAST SURGICAL HISTORY:  H/O bilateral salpingectomy     History of appendectomy 15 y/o    S/P laparoscopy 7/2021, 10/2021

## 2024-02-08 NOTE — PROGRESS NOTE ADULT - ASSESSMENT
A/P: 38y Female s/p complex cystorraphy and bilateral ureteral stent insertion. Patient lethargy and feels nausea. Has dc in place with pink color urine. KYARA drain with bright red drainage.     - F/u KUB final read  - KYARA Cr AM  - Keep dc and KYARA  - DVT prophylaxis/OOB  - Incentive spirometry  - Analgesia and antiemetics as needed

## 2024-02-08 NOTE — OB RN DELIVERY SUMMARY - NS_SEPSISRSKCALC_OBGYN_ALL_OB_FT
EOS calculated successfully. EOS Risk Factor: 0.5/1000 live births ([facility] national incidence); GA=36w5d; Temp=99; ROM=0; GBS='Negative'; Antibiotics='No antibiotics or any antibiotics < 2 hrs prior to birth'

## 2024-02-08 NOTE — OB RN DELIVERY SUMMARY - NS_BABYDISPOOTHERA_OBGYN_ALL_OB_FT
baby to recovery with father of baby-mom had general anesthesia OB PACU until return of Baby's mother

## 2024-02-08 NOTE — OB RN PATIENT PROFILE - LIVING CHILDREN, OB PROFILE
[facility]                                        Neurology at [place]                                  Cali Perez, & David                                      [address]                                           [place], [place], [address]                                                [phone]        CC: Vertigo    HISTORY:  The patient is a 88y Male who had a brief episode of spinning type dizziness while sitting on the commode. This resolved within about 20 seconds.   He had a second episode the next day and ultimately presented to the ER.   He has significant cardiac history including pacemaker. (JW)    Interval history: no further dizzy episodes    Review of systems (neurology): Denies headache or dizziness. Denies weakness/numbness.  Denies speech/language deficits. Denies diplopia/blurred vision.  Denies confusion    MEDICATIONS  (STANDING):  ascorbic acid 500 milliGRAM(s) Oral daily  atorvastatin 40 milliGRAM(s) Oral at bedtime  metoprolol tartrate 50 milliGRAM(s) Oral two times a day  multivitamin 1 Tablet(s) Oral daily  tamsulosin 0.4 milliGRAM(s) Oral at bedtime  warfarin 6 milliGRAM(s) Oral once    MEDICATIONS  (PRN):      Vital Signs Last 24 Hrs  T(C): 36.5 (29 Sep 2021 08:40), Max: 36.6 (29 Sep 2021 06:24)  T(F): 97.7 (29 Sep 2021 08:40), Max: 97.8 (29 Sep 2021 06:24)  HR: 79 (29 Sep 2021 08:40) (79 - 85)  BP: 165/96 (29 Sep 2021 08:40) (163/92 - 167/85)  BP(mean): --  RR: 19 (29 Sep 2021 08:40) (18 - 19)  SpO2: 93% (29 Sep 2021 08:40) (93% - 97%)    Detailed neuro exam:    Mental status: The patient is awake, alert, and fully oriented. There is no aphasia. Attention span is normal.     Cranial nerves:  Pupils react symmetrically to light. There is no visual field deficit to confrontation. Extraocular motion is full with no nystagmus.  Facial sensation is intact. Facial musculature is symmetric. Palate elevates symmetrically. Tongue is midline.    Motor: There is normal bulk and tone.  Strength is 5/5 in the right arm and leg.   Strength is 5/5 in the left arm and leg.    Sensation: Intact to light touch and pin. There is no extinction to double simultaneous stimulation.    Reflexes: 1+ throughout and plantar responses are flexor.    Cerebellar: There is no dysmetria on finger to nose testing.      LABS:                         9.5    5.80  )-----------( 293      ( 27 Sep 2021 14:58 )             28.9       09-27    138  |  99  |  14.7  ----------------------------<  160<H>  3.8   |  26.0  |  0.88    Ca    9.4      27 Sep 2021 14:58    TPro  7.5  /  Alb  3.7  /  TBili  0.6  /  DBili  x   /  AST  19  /  ALT  22  /  AlkPhos  178<H>  09-27      PT/INR - ( 28 Sep 2021 08:36 )   PT: 22.8 sec;   INR: 2.03 ratio         PTT - ( 27 Sep 2021 20:23 )  PTT:42.1 sec    RADIOLOGY   Repeat CT head 9/29/21- no acute CVA, mass or blood  (+) SVID and strophy    Carotid duplex 9/29/21- no significant ICA stenosis      
KAYLEIGH ALCANTARA  [id number]      Chief Complaint: Dizziness/CAD/PPM    Interval History: The patient denies recurrent dizziness. Denies angina or dyspnea.    Tele: sinus rhythm 80s BPM      Current meds:   ascorbic acid 500 milliGRAM(s) Oral daily  atorvastatin 40 milliGRAM(s) Oral at bedtime  influenza   Vaccine 0.5 milliLiter(s) IntraMuscular once  metoprolol tartrate 50 milliGRAM(s) Oral two times a day  multivitamin 1 Tablet(s) Oral daily  tamsulosin 0.4 milliGRAM(s) Oral at bedtime  warfarin 6 milliGRAM(s) Oral once      Objective:     Vital Signs:   T(C): 36.7 (09-29-21 @ 13:51), Max: 36.7 (09-29-21 @ 13:51)  HR: 96 (09-29-21 @ 13:51) (79 - 96)  BP: 164/102 (09-29-21 @ 13:51) (163/92 - 167/85)  RR: 19 (09-29-21 @ 13:51) (18 - 19)  SpO2: 98% (09-29-21 @ 13:51) (93% - 98%)  Wt(kg): --      PHYSICAL EXAM:  General: elderly man, sitting upright in bed, no distress  Neck: supple  CVS: JVP ~ 7 cm H20, RRR, s1, s2, no murmurs  Pulm: unlabored respirations, CTAB  Abd: non-distended  Ext: no lower extremity edema   Neuro: awake, alert & oriented   Psych: Normal affect      Labs:   29 Sep 2021 07:47    141    |  103    |  14.6   ----------------------------<  115    4.4     |  27.0   |  1.02     Ca    9.1        29 Sep 2021 07:47                            9.4    4.69  )-----------( 276      ( 29 Sep 2021 07:47 )             29.3     PT/INR - ( 29 Sep 2021 07:47 )   PT: 20.8 sec;   INR: 1.85 ratio         PTT - ( 29 Sep 2021 07:47 )  PTT:38.5 sec          TTE (4/2021):   1. 3D LVEF is 71%.   2. Normal global left ventricular systolic function.   3. Spectral Doppler shows impaired relaxation pattern of left ventricular myocardial filling (Grade I diastolic dysfunction).   4. There is severe concentric left ventricular hypertrophy.   5. Mildly increased RV wall thickness with normal RV size and function (3D RVEF 54%).   6. Normal left atrial size.   7. Normal right atrial size.   8. Lipomatous hypertrophy and hypermobility of the intra-atrial septum.   9. Mild-moderate tricuspid regurgitation.  10. Mild to moderate pulmonic valve regurgitation.  11. Trace mitral valve regurgitation.  12. Moderate thickening of the anterior and posterior mitral valve leaflets.  13. Peak transaortic gradient equals 46.4 mmHg, mean transaortic gradient equals 31.8 mmHg, the calculated aortic valve area equals 0.80 cm² by the continuity equation consistent with severe aortic stenosis.  14. Mild dilatation of the aortic root (3.9 cm) and ascending aorta (4.1 cm).    Cardiac Cath (5/2021):  DIAGNOSTIC IMPRESSIONS: Severe LM, ostial circumflex/OM1 and LAD disease.  Severe AS by TTE.  DIAGNOSTIC RECOMMENDATIONS: CABG, AVR referral.      ECG (9/27/21): sinus rhythm, first degree AV block, bifascicular block     CXR (9/27/21):  IMPRESSION:   No radiographic evidence of active chest disease.    CT Head (9/27/21):  No evidence of acute intracranial abnormality.  No evidence of hemorrhage.  Chronic changes as above.    Carotid US (9/29/21):  IMPRESSION: No significant hemodynamic stenosis of either internal carotid artery.    CT Head (9/29/21):  No acute intracranial hemorrhage or acute territorial infarct.  If symptoms persist, follow-up MRI exam recommended.      Home Cardiac Meds:  Coumadin  Nifedipine 60 mg daily  Atorvastatin 40 mg daily  Irbesartan 300 mg daily  Metoprolol tartrate 50 mg q12h  Aspirin 325 mg daily  
0

## 2024-02-08 NOTE — OB RN DELIVERY SUMMARY - NS_PROPHYLACTICABXDOSE_OBGYN_ALL_OB_FT
Discussion/Summary  RN called patient x 2 to inform him that his insurance did not authorize cervical fusion surgery on 10/12/18 with Dr. Longoria and surgery has to be cancelled until authorization obtained. RN unable to leave patient message because no voicemail set up.     Patient called back to say that he was cancelling his surgery because he fell out of his trailer and knocked his tooth out and did not want to have surgery at this time. Patient to contact office when he is ready to schedule surgery and insurance authorization is obtained.      Signatures   Electronically signed by : Beatriz Durant R.N.; Oct  3 2018 10:54AM CST     2 gms

## 2024-02-08 NOTE — OB RN DELIVERY SUMMARY - NS_GENERALBABYACOMMENTA_OBGYN_ALL_OB_FT
Pt had general anesthesia, and repair of bladder and ureteral stents placed. Baby was taken to OB PACU where baby's father and grandmothers were awaiting baby's arrival. They all remained until baby's mother was transported to OB PACU several hours later.

## 2024-02-08 NOTE — OB PROVIDER H&P - NSHPPHYSICALEXAM_GEN_ALL_CORE
Objective  – PE:   CV: RRR  Pulm: breathing comfortably on RA  Abd: gravid, nontender  Extr: moving all extremities with ease  – FHT: reacive  – Sono: vertex

## 2024-02-08 NOTE — OB RN DELIVERY SUMMARY - BABY A: STOOL IN DELIVERY
Patient:   LISA DAVENPORT            MRN: [id number]            FIN: [id number]               Age:   75 years     Sex:  FEMALE     :  47   Associated Diagnoses:   None   Author:   BELLE CLEMENS      H&P UPDATE::    I HAVE REVEIWED THE HISTORY & PHYSICAL (H&P COMPLETED IN THE LAST 30 DAYS) AND EXAMINED THE PATIENT:    ___x__   NO CHANGES HAVE OCCURRED IN THE PATIENT'S CONDITION SINCE THE H&P WAS COMPLETED       _____ THE FOLLOWING ARE THE CHANGES IN THE PATIENT'S CONDITION (SPECIFIED BELOW):                   Electronically Signed On 2018 15:31  __________________________________________________   BELLE CLEMENS    
no

## 2024-02-08 NOTE — PROGRESS NOTE ADULT - SUBJECTIVE AND OBJECTIVE BOX
Post op Check    Pt seen and examined without complaints. Pain is controlled. Patient states weakness, and nausea, but no vomiting. Denies fever, SOB, chest pain.     Vital Signs Last 24 Hrs  T(C): 36.9 (08 Feb 2024 18:30), Max: 37.2 (08 Feb 2024 11:20)  T(F): 98.4 (08 Feb 2024 18:30), Max: 99 (08 Feb 2024 11:20)  HR: 83 (08 Feb 2024 20:30) (65 - 95)  BP: 185/85 (08 Feb 2024 20:30) (132/78 - 196/104)  BP(mean): 122 (08 Feb 2024 20:30) (122 - 142)  RR: 18 (08 Feb 2024 20:30) (18 - 18)  SpO2: 98% (08 Feb 2024 20:30) (84% - 100%)    Parameters below as of 08 Feb 2024 20:30  Patient On (Oxygen Delivery Method): room air        I&O's Summary    08 Feb 2024 07:01  -  08 Feb 2024 21:00  --------------------------------------------------------  IN: 300 mL / OUT: 2469 mL / NET: -2169 mL        Physical Exam  Gen: NAD, A&Ox3  Pulm: No respiratory distress, no subcostal retractions  CV: RRR, no JVD  Abd: Soft, mild incisional tender. no rebound, guarding. suprapubic incision covered by dressing, dry, clear, intact. KYARA drain in place with bright red output   : dc in place with light pink color urine                          7.1    8.42  )-----------( 149      ( 08 Feb 2024 16:55 )             21.8       02-08    137  |  105  |  10  ----------------------------<  79  4.1   |  18<L>  |  0.99    Ca    9.2      08 Feb 2024 10:53    TPro  6.9  /  Alb  3.6  /  TBili  0.5  /  DBili  x   /  AST  22  /  ALT  17  /  AlkPhos  168<H>  02-08      Radiology     KUB x-ray: Pending final read

## 2024-02-08 NOTE — OB RN DELIVERY SUMMARY - NSSELHIDDEN_OBGYN_ALL_OB_FT
[NS_DeliveryAttending1_OBGYN_ALL_OB_FT:[id number]==],[NS_DeliveryAssist1_OBGYN_ALL_OB_FT:[id number]],[NS_DeliveryAttending2_OBGYN_ALL_OB_FT:[id number]],[NS_DeliveryRN_OBGYN_ALL_OB_FT:[id number]]

## 2024-02-08 NOTE — OB RN PATIENT PROFILE - NS_VBACATTEMPT_OBGYN_ALL_OB
Spoke to patient we will do a quick virtual on tomorrow after my last patient will schedule the   No

## 2024-02-08 NOTE — OB RN PATIENT PROFILE - NSICDXPASTMEDICALHX_GEN_ALL_CORE_FT
PAST MEDICAL HISTORY:  2019 novel coronavirus disease (COVID-19) 3/2021    Asthma no attacks- mild    Gestational diabetes     H/O endometritis     Hypothyroidism     AMY (iron deficiency anemia)     Preeclampsia     Ruptured appendix     Type 2A von Willebrand disease     Urinary retention

## 2024-02-08 NOTE — OB PROVIDER H&P - ATTENDING COMMENTS
P0 @ 36+wks with mild PEC and vWB  - per anesthesia not clear for epidural. R/B/A discussed. Pt desires elective primary  delivery. R/B/A explained at length  - Receiving vonvendi per heme recs. Will replace factors as needed.

## 2024-02-08 NOTE — OB RN PATIENT PROFILE - NSMATERNALFETALCONCERNS_OBGYN_ALL_OB_FT
Maternal/Fetal Alert  24 - Materal Von Willebrand disease Type 2A.  MDM being held today.  See minutes for details. Notify peds. -NADEGE Grewal  24 - ***See detailed minutes for recommendations regarding medications and products***   considerations - male fetus, avoid vacuum delivery and circumcision until von Willebrand testing is performed.   See detailed minutes for all considerations. -Anna Kelly RNC

## 2024-02-09 LAB
ALBUMIN SERPL ELPH-MCNC: 2.7 G/DL — LOW (ref 3.3–5)
ALP SERPL-CCNC: 113 U/L — SIGNIFICANT CHANGE UP (ref 40–120)
ALT FLD-CCNC: 17 U/L — SIGNIFICANT CHANGE UP (ref 10–45)
ANION GAP SERPL CALC-SCNC: 12 MMOL/L — SIGNIFICANT CHANGE UP (ref 5–17)
APTT BLD: 25.5 SEC — SIGNIFICANT CHANGE UP (ref 24.5–35.6)
AST SERPL-CCNC: 32 U/L — SIGNIFICANT CHANGE UP (ref 10–40)
BASOPHILS # BLD AUTO: 0.02 K/UL — SIGNIFICANT CHANGE UP (ref 0–0.2)
BASOPHILS NFR BLD AUTO: 0.2 % — SIGNIFICANT CHANGE UP (ref 0–2)
BILIRUB SERPL-MCNC: 0.6 MG/DL — SIGNIFICANT CHANGE UP (ref 0.2–1.2)
BUN SERPL-MCNC: 10 MG/DL — SIGNIFICANT CHANGE UP (ref 7–23)
CALCIUM SERPL-MCNC: 7.8 MG/DL — LOW (ref 8.4–10.5)
CHLORIDE SERPL-SCNC: 102 MMOL/L — SIGNIFICANT CHANGE UP (ref 96–108)
CO2 SERPL-SCNC: 20 MMOL/L — LOW (ref 22–31)
CREAT FLD-MCNC: 1.1 MG/DL — SIGNIFICANT CHANGE UP
CREAT SERPL-MCNC: 1.11 MG/DL — SIGNIFICANT CHANGE UP (ref 0.5–1.3)
EGFR: 66 ML/MIN/1.73M2 — SIGNIFICANT CHANGE UP
EOSINOPHIL # BLD AUTO: 0 K/UL — SIGNIFICANT CHANGE UP (ref 0–0.5)
EOSINOPHIL NFR BLD AUTO: 0 % — SIGNIFICANT CHANGE UP (ref 0–6)
FIBRINOGEN PPP-MCNC: 367 MG/DL — SIGNIFICANT CHANGE UP (ref 200–445)
GLUCOSE SERPL-MCNC: 192 MG/DL — HIGH (ref 70–99)
HCT VFR BLD CALC: 28.4 % — LOW (ref 34.5–45)
HGB BLD-MCNC: 9.1 G/DL — LOW (ref 11.5–15.5)
IMM GRANULOCYTES NFR BLD AUTO: 0.3 % — SIGNIFICANT CHANGE UP (ref 0–0.9)
INR BLD: 0.94 RATIO — SIGNIFICANT CHANGE UP (ref 0.85–1.18)
LDH SERPL L TO P-CCNC: 250 U/L — HIGH (ref 50–242)
LYMPHOCYTES # BLD AUTO: 0.89 K/UL — LOW (ref 1–3.3)
LYMPHOCYTES # BLD AUTO: 7.8 % — LOW (ref 13–44)
MAGNESIUM SERPL-MCNC: 3.3 MG/DL — HIGH (ref 1.6–2.6)
MAGNESIUM SERPL-MCNC: 6.2 MG/DL — HIGH (ref 1.6–2.6)
MAGNESIUM SERPL-MCNC: 7.3 MG/DL — HIGH (ref 1.6–2.6)
MCHC RBC-ENTMCNC: 30 PG — SIGNIFICANT CHANGE UP (ref 27–34)
MCHC RBC-ENTMCNC: 32 GM/DL — SIGNIFICANT CHANGE UP (ref 32–36)
MCV RBC AUTO: 93.7 FL — SIGNIFICANT CHANGE UP (ref 80–100)
MONOCYTES # BLD AUTO: 0.63 K/UL — SIGNIFICANT CHANGE UP (ref 0–0.9)
MONOCYTES NFR BLD AUTO: 5.5 % — SIGNIFICANT CHANGE UP (ref 2–14)
NEUTROPHILS # BLD AUTO: 9.85 K/UL — HIGH (ref 1.8–7.4)
NEUTROPHILS NFR BLD AUTO: 86.2 % — HIGH (ref 43–77)
NRBC # BLD: 0 /100 WBCS — SIGNIFICANT CHANGE UP (ref 0–0)
PLATELET # BLD AUTO: 177 K/UL — SIGNIFICANT CHANGE UP (ref 150–400)
POTASSIUM SERPL-MCNC: 4.6 MMOL/L — SIGNIFICANT CHANGE UP (ref 3.5–5.3)
POTASSIUM SERPL-SCNC: 4.6 MMOL/L — SIGNIFICANT CHANGE UP (ref 3.5–5.3)
PROT SERPL-MCNC: 5.1 G/DL — LOW (ref 6–8.3)
PROTHROM AB SERPL-ACNC: 9.9 SEC — SIGNIFICANT CHANGE UP (ref 9.5–13)
RBC # BLD: 3.03 M/UL — LOW (ref 3.8–5.2)
RBC # FLD: 15.2 % — HIGH (ref 10.3–14.5)
SODIUM SERPL-SCNC: 134 MMOL/L — LOW (ref 135–145)
SPECIMEN SOURCE FLD: SIGNIFICANT CHANGE UP
URATE SERPL-MCNC: 5.4 MG/DL — SIGNIFICANT CHANGE UP (ref 2.5–7)
VWF:RCO ACT/NOR PPP PL AGG: 127 % — SIGNIFICANT CHANGE UP (ref 45–133)
WBC # BLD: 11.42 K/UL — HIGH (ref 3.8–10.5)
WBC # FLD AUTO: 11.42 K/UL — HIGH (ref 3.8–10.5)

## 2024-02-09 RX ORDER — NIFEDIPINE 30 MG
30 TABLET, EXTENDED RELEASE 24 HR ORAL ONCE
Refills: 0 | Status: COMPLETED | OUTPATIENT
Start: 2024-02-09 | End: 2024-02-09

## 2024-02-09 RX ORDER — NIFEDIPINE 30 MG
60 TABLET, EXTENDED RELEASE 24 HR ORAL DAILY
Refills: 0 | Status: DISCONTINUED | OUTPATIENT
Start: 2024-02-09 | End: 2024-02-14

## 2024-02-09 RX ORDER — IBUPROFEN 200 MG
600 TABLET ORAL EVERY 6 HOURS
Refills: 0 | Status: DISCONTINUED | OUTPATIENT
Start: 2024-02-09 | End: 2024-02-14

## 2024-02-09 RX ORDER — NIFEDIPINE 30 MG
30 TABLET, EXTENDED RELEASE 24 HR ORAL DAILY
Refills: 0 | Status: DISCONTINUED | OUTPATIENT
Start: 2024-02-09 | End: 2024-02-09

## 2024-02-09 RX ORDER — HEPARIN SODIUM 5000 [USP'U]/ML
5000 INJECTION INTRAVENOUS; SUBCUTANEOUS EVERY 8 HOURS
Refills: 0 | Status: DISCONTINUED | OUTPATIENT
Start: 2024-02-09 | End: 2024-02-11

## 2024-02-09 RX ORDER — MAGNESIUM SULFATE 500 MG/ML
2 VIAL (ML) INJECTION
Qty: 40 | Refills: 0 | Status: DISCONTINUED | OUTPATIENT
Start: 2024-02-09 | End: 2024-02-14

## 2024-02-09 RX ORDER — KETOROLAC TROMETHAMINE 30 MG/ML
30 SYRINGE (ML) INJECTION EVERY 8 HOURS
Refills: 0 | Status: DISCONTINUED | OUTPATIENT
Start: 2024-02-09 | End: 2024-02-10

## 2024-02-09 RX ORDER — DIPHENHYDRAMINE HCL 50 MG
25 CAPSULE ORAL ONCE
Refills: 0 | Status: DISCONTINUED | OUTPATIENT
Start: 2024-02-09 | End: 2024-02-14

## 2024-02-09 RX ORDER — LABETALOL HCL 100 MG
40 TABLET ORAL ONCE
Refills: 0 | Status: COMPLETED | OUTPATIENT
Start: 2024-02-09 | End: 2024-02-09

## 2024-02-09 RX ORDER — ACETAMINOPHEN 500 MG
1000 TABLET ORAL ONCE
Refills: 0 | Status: COMPLETED | OUTPATIENT
Start: 2024-02-09 | End: 2024-02-09

## 2024-02-09 RX ORDER — ACETAMINOPHEN 500 MG
975 TABLET ORAL EVERY 6 HOURS
Refills: 0 | Status: DISCONTINUED | OUTPATIENT
Start: 2024-02-09 | End: 2024-02-14

## 2024-02-09 RX ADMIN — HEPARIN SODIUM 5000 UNIT(S): 5000 INJECTION INTRAVENOUS; SUBCUTANEOUS at 22:02

## 2024-02-09 RX ADMIN — Medication 400 MILLIGRAM(S): at 01:34

## 2024-02-09 RX ADMIN — Medication 40 MILLIGRAM(S): at 00:55

## 2024-02-09 RX ADMIN — HEPARIN SODIUM 5000 UNIT(S): 5000 INJECTION INTRAVENOUS; SUBCUTANEOUS at 14:36

## 2024-02-09 RX ADMIN — Medication 30 MILLIGRAM(S): at 01:02

## 2024-02-09 RX ADMIN — Medication 975 MILLIGRAM(S): at 11:50

## 2024-02-09 RX ADMIN — ONDANSETRON 4 MILLIGRAM(S): 8 TABLET, FILM COATED ORAL at 01:42

## 2024-02-09 RX ADMIN — Medication 975 MILLIGRAM(S): at 19:00

## 2024-02-09 RX ADMIN — HYDROMORPHONE HYDROCHLORIDE 30 MILLILITER(S): 2 INJECTION INTRAMUSCULAR; INTRAVENOUS; SUBCUTANEOUS at 19:08

## 2024-02-09 RX ADMIN — HYDROMORPHONE HYDROCHLORIDE 30 MILLILITER(S): 2 INJECTION INTRAMUSCULAR; INTRAVENOUS; SUBCUTANEOUS at 07:43

## 2024-02-09 RX ADMIN — Medication 975 MILLIGRAM(S): at 10:56

## 2024-02-09 RX ADMIN — Medication 60 MILLIGRAM(S): at 18:11

## 2024-02-09 RX ADMIN — Medication 975 MILLIGRAM(S): at 18:11

## 2024-02-09 NOTE — PROGRESS NOTE ADULT - SUBJECTIVE AND OBJECTIVE BOX
The patient was seen and examined at bedside.  No acute events overnight and the patient is without acute complaints this AM.    T(C): 36.6 (02-09-24 @ 05:15), Max: 37.2 (02-08-24 @ 11:20)  HR: 105 (02-09-24 @ 05:15) (61 - 105)  BP: 128/87 (02-09-24 @ 05:15) (120/62 - 196/104)  RR: 18 (02-09-24 @ 05:15) (18 - 18)  SpO2: 96% (02-09-24 @ 05:15) (84% - 100%)  Wt(kg): --    Physical Exam:    General: NAD, A+Ox3  Abdomen: soft, non-tender, +mild distention, +KYARA draining serosanguinous fluid      02-08 @ 07:01  -  02-09 @ 07:00  --------------------------------------------------------  IN: 300 mL / OUT: 4269 mL / NET: -3969 mL      F - 1800cc blood tinged    KYARA - unrecorded volume, serosanguinous fluid                            9.1    11.42 )-----------( 177               28.4     134  |  102  |  10  ----------------------------<  192  4.6   |  20  |  1.11    Ca    7.8  Mg     3.3    TPro  5.1  /  Alb  2.7  /  TBili  0.6  /  DBili  x   /  AST  32  /  ALT  17  /  AlkPhos  113

## 2024-02-09 NOTE — CHART NOTE - NSCHARTNOTEFT_GEN_A_CORE
Patient feels well postpartum. Reports pain is well controlled. Denies HA, change in vision, feeling lightheaded dizzy, having CP/SOB, or N/V. She has had multiple severe range blood pressures in PACU requiring Pro 10 IR @1749 and Lab 20 IVP @2047. Patient then had sustained severe range BP at 0045 and will now get Lab 40 IVP and give Pro 30XL stat to increase total dose to Procardia 60XL daily. Patient is also now due for VonVendi 1250u.     KUB reviewed by urology and both stents are in proper place. Left stent is below right stent but does not need to be repositioned.      T(C): 36.4 (24 @ 22:00), Max: 37.2 (24 @ 11:20)  HR: 69 (24 @ 00:45) (65 - 95)  BP: 169/82 (24 @ 00:45) (132/78 - 196/104)  RR: 18 (24 @ 00:45) (18 - 18)  SpO2: 99% (24 @ 00:45) (84% - 100%)    Gen: NAD  CV: RRR  Pulm: CTAB  Abd: soft, appropriately tender    37yo  h/o VwB Type 2A and urinary retention s/p pC/S c/b cystotomy with bilateral ureteral stents and KYARA drain s/p 1u PRBC in OR and 2500u VonVendi and TXA. Postpartum she met criteria for pre-eclampsia with severe features requiring Pro 10 and Lab 20. Will now give Lab 40 and increase standing medication to 60XL. HELLP labs wnl although Cr now 1.02. Patient is asymptomatic and otherwise recovering well postpartum.    #sPEC  - c/w Procardia 60XL  - f/u AM HELLP labs  - ctm BP q15 in PACU  - c/w Mgx24 hrs postpartum  - Strict I&Os    #VwB  - c/w VonVendi 1250u q12 hrs  - pad counts  - Trend H/H    d/w Dr. Juan Willis, PGY3

## 2024-02-09 NOTE — PROGRESS NOTE ADULT - SUBJECTIVE AND OBJECTIVE BOX
OB Progress Note:  Delivery, POD#1    S: 37yo POD#1 s/p LTCS c/b cystotomy s/p cystorraphy and ureteral stent placement. Her pain is well controlled. She is tolerating a regular diet, not yet passing flatus. Denies N/V. Denies CP/SOB/lightheadedness/dizziness. She is ambulating without difficulty. Gillespie in place.    O:   Vital Signs Last 24 Hrs  T(C): 36.5 (2024 03:30), Max: 37.2 (2024 11:20)  T(F): 97.7 (2024 03:30), Max: 99 (2024 11:20)  HR: 62 (2024 04:45) (61 - 95)  BP: 120/62 (2024 04:45) (120/62 - 196/104)  BP(mean): 85 (2024 04:45) (85 - 142)  RR: 18 (2024 04:45) (18 - 18)  SpO2: 100% (2024 04:45) (84% - 100%)    Parameters below as of 2024 04:45  Patient On (Oxygen Delivery Method): room air        Labs:  Blood type: AB Positive  Rubella IgG: RPR:                           9.1<L>   11.42<H> >-----------< 177    (  @ 05:28 )             28.4<L>                        9.5<L>   10.75<H> >-----------< 164    (  @ 21:15 )             29.2<L>                        7.1<L>   8.42 >-----------< 149<L>    (  @ 16:55 )             21.8<L>                        10.0<L>   5.87 >-----------< 168    (  @ 10:53 )             31.6<L>    24 @ 21:15      138  |  108  |  10  ----------------------------<  131<H>  4.2   |  17<L>  |  1.02    24 @ 10:53      137  |  105  |  10  ----------------------------<  79  4.1   |  18<L>  |  0.99        Ca    8.3<L>      2024 21:15  Ca    9.2      2024 10:53    TPro  4.9<L>  /  Alb  2.7<L>  /  TBili  0.7  /  DBili  x   /  AST  32  /  ALT  16  /  AlkPhos  119  24 @ 21:15  TPro  6.9  /  Alb  3.6  /  TBili  0.5  /  DBili  x   /  AST  22  /  ALT  17  /  AlkPhos  168<H>  24 @ 10:53          desmopressin IVPB 20 MICROGram(s) IV Intermittent once  diphenhydrAMINE Injectable 25 milliGRAM(s) IV Push once PRN  heparin   Injectable 5000 Unit(s) SubCutaneous every 8 hours  HYDROmorphone PCA (1 mG/mL) 30 milliLiter(s) PCA Continuous PCA Continuous  HYDROmorphone PCA (1 mG/mL) Rescue Clinician Bolus 0.5 milliGRAM(s) IV Push every 15 minutes PRN  lactated ringers. 1000 milliLiter(s) IV Continuous <Continuous>  nalbuphine Injectable 2.5 milliGRAM(s) IV Push every 6 hours PRN  naloxone Injectable 0.1 milliGRAM(s) IV Push every 3 minutes PRN  NIFEdipine XL 60 milliGRAM(s) Oral daily  ondansetron Injectable 4 milliGRAM(s) IV Push every 6 hours PRN  oxytocin Infusion 333.333 milliUNIT(s)/Min IV Continuous <Continuous>      PE:  General: NAD  CV: RRR  Pulm: CTAB  Abdomen: Mildly distended, appropriately tender, incision c/d/i.  Extremities: No calf tenderness, no pitting edema  KYARA drain c/d/i

## 2024-02-09 NOTE — PROGRESS NOTE ADULT - ASSESSMENT
39yo  h/o VwB Type 2A and urinary retention POD#1 s/p pC/S c/b cystotomy with bilateral ureteral stents and KYARA drain s/p 1u PRBC in OR and 2500u VonVendi and TXA. Postpartum she met criteria for pre-eclampsia with severe features requiring Pro 10 and Lab 20/40. Overnight BP medication to 60XL. HELLP labs wnl although Cr now 1.02. Patient is asymptomatic and otherwise recovering well postpartum.    #sPEC  - c/w Procardia 60XL  - f/u AM HELLP labs  - ctm BP q15 in PACU  - c/w Mgx24 hrs postpartum  - Strict I&Os    #Cystotomy s/p cystorraphy  - f/u KUB final. Per urology ureteral stents in correct position  - UOP 1800, adequate    #VwB  - c/w VonVendi 1250u q12 hrs x3-5 days  - c/w pad counts  - Trend H/H    d/w Dr. Juan Willis, PGY3.   39yo  h/o VwB Type 2A and urinary retention POD#1 s/p pC/S c/b cystotomy with bilateral ureteral stents and KYARA drain s/p 1u PRBC in OR and 2500u VonVendi and TXA. Postpartum she met criteria for pre-eclampsia with severe features requiring Pro 10 and Lab 20/40. Overnight BP medication to 60XL. HELLP labs wnl although Cr now 1.02. Patient is asymptomatic and otherwise recovering well postpartum.    #sPEC  - c/w Procardia 60XL  - f/u AM HELLP labs  - ctm BP q15 in PACU  - c/w Mgx24 hrs postpartum  - Strict I&Os    #Cystotomy s/p cystorraphy  - f/u KUB final. Per urology ureteral stents in correct position  - UOP 1800, adequate    #VwB  - c/w VonVendi 1250u q12 hrs x5 days  - HSQ 5K TID for DVT ppx  - c/w pad counts  - Trend H/H    #Postpartum  - Reg diet  - Encourage ambulation    d/w Dr. Juan Willis, PGY3.

## 2024-02-09 NOTE — CHART NOTE - NSCHARTNOTEFT_GEN_A_CORE
Patient reports that when Vonvendi was pushed at the 's suggested rate she "felt like I was dying" but that it was tolerated well when pushed slowly. Vonvendi 1250units given over 6 minutes per patient request at 1:45am.     - c/w Vonvendi 1250u q12 x5 days    DUNG Willis, PGY3

## 2024-02-09 NOTE — PROGRESS NOTE ADULT - SUBJECTIVE AND OBJECTIVE BOX
Day 1 of Anesthesia Pain Management Service    SUBJECTIVE: I'm doing ok    Pain Scale Score:	[X] Refer to charted pain scores    THERAPY:    [ ] IV PCA Morphine		[ ] 5 mg/mL	[ ] 1 mg/mL  [X] IV PCA Hydromorphone	[ ] 5 mg/mL	[X] 1 mg/mL  [ ] IV PCA Fentanyl		[ ] 50 micrograms/mL    Demand dose: 0.2 mg     Lockout: 6 minutes   Continuous Rate: 0 mg/hr  4 Hour Limit: 4 mg    MEDICATIONS  (STANDING):  acetaminophen     Tablet .. 975 milliGRAM(s) Oral every 6 hours  desmopressin IVPB 20 MICROGram(s) IV Intermittent once  heparin   Injectable 5000 Unit(s) SubCutaneous every 8 hours  HYDROmorphone PCA (1 mG/mL) 30 milliLiter(s) PCA Continuous PCA Continuous  ibuprofen  Tablet. 600 milliGRAM(s) Oral every 6 hours  ketorolac   Injectable 30 milliGRAM(s) IV Push every 8 hours  lactated ringers. 1000 milliLiter(s) (125 mL/Hr) IV Continuous <Continuous>  magnesium sulfate Infusion 2 Gm/Hr (50 mL/Hr) IV Continuous <Continuous>  NIFEdipine XL 60 milliGRAM(s) Oral daily  oxytocin Infusion 333.333 milliUNIT(s)/Min (1000 mL/Hr) IV Continuous <Continuous>    MEDICATIONS  (PRN):  diphenhydrAMINE Injectable 25 milliGRAM(s) IV Push once PRN Allergy symptoms  HYDROmorphone PCA (1 mG/mL) Rescue Clinician Bolus 0.5 milliGRAM(s) IV Push every 15 minutes PRN for Pain Scale GREATER THAN 6  nalbuphine Injectable 2.5 milliGRAM(s) IV Push every 6 hours PRN Pruritus  naloxone Injectable 0.1 milliGRAM(s) IV Push every 3 minutes PRN For ANY of the following changes in patient status:  A. RR LESS THAN 10 breaths per minute, B. Oxygen saturation LESS THAN 90%, C. Sedation score of 6  ondansetron Injectable 4 milliGRAM(s) IV Push every 6 hours PRN Nausea      OBJECTIVE:    Sedation Score:	[ X] Alert 	[ ] Drowsy 	[ ] Arousable	[ ] Asleep	[ ] Unresponsive    Side Effects:	[X ] None	[ ] Nausea	[ ] Vomiting	[ ] Pruritus  		[ ] Other:    Vital Signs Last 24 Hrs  T(C): 36.6 (09 Feb 2024 05:15), Max: 37.2 (08 Feb 2024 11:20)  T(F): 97.8 (09 Feb 2024 05:15), Max: 99 (08 Feb 2024 11:20)  HR: 105 (09 Feb 2024 05:15) (61 - 105)  BP: 128/87 (09 Feb 2024 05:15) (120/62 - 196/104)  BP(mean): 85 (09 Feb 2024 04:45) (85 - 142)  RR: 18 (09 Feb 2024 05:15) (18 - 18)  SpO2: 96% (09 Feb 2024 05:15) (84% - 100%)    Parameters below as of 09 Feb 2024 05:15  Patient On (Oxygen Delivery Method): room air        ASSESSMENT/ PLAN    Therapy to  be:               [X] Continued   [ ] Discontinued   [ ] Changed to PRN Analgesics    Documentation and Verification of current medications:   [X] Done	[ ] Not done, not eligible    Comments: PCA use 0-1x/hr. Doing ok

## 2024-02-10 LAB
ALBUMIN SERPL ELPH-MCNC: 2.6 G/DL — LOW (ref 3.3–5)
ALP SERPL-CCNC: 109 U/L — SIGNIFICANT CHANGE UP (ref 40–120)
ALT FLD-CCNC: 13 U/L — SIGNIFICANT CHANGE UP (ref 10–45)
ANION GAP SERPL CALC-SCNC: 12 MMOL/L — SIGNIFICANT CHANGE UP (ref 5–17)
APTT BLD: 28.6 SEC — SIGNIFICANT CHANGE UP (ref 24.5–35.6)
AST SERPL-CCNC: 20 U/L — SIGNIFICANT CHANGE UP (ref 10–40)
BASOPHILS # BLD AUTO: 0.01 K/UL — SIGNIFICANT CHANGE UP (ref 0–0.2)
BASOPHILS NFR BLD AUTO: 0.1 % — SIGNIFICANT CHANGE UP (ref 0–2)
BILIRUB SERPL-MCNC: 0.2 MG/DL — SIGNIFICANT CHANGE UP (ref 0.2–1.2)
BUN SERPL-MCNC: 11 MG/DL — SIGNIFICANT CHANGE UP (ref 7–23)
CALCIUM SERPL-MCNC: 7.3 MG/DL — LOW (ref 8.4–10.5)
CHLORIDE SERPL-SCNC: 102 MMOL/L — SIGNIFICANT CHANGE UP (ref 96–108)
CO2 SERPL-SCNC: 23 MMOL/L — SIGNIFICANT CHANGE UP (ref 22–31)
CREAT SERPL-MCNC: 1.17 MG/DL — SIGNIFICANT CHANGE UP (ref 0.5–1.3)
EGFR: 62 ML/MIN/1.73M2 — SIGNIFICANT CHANGE UP
EOSINOPHIL # BLD AUTO: 0.03 K/UL — SIGNIFICANT CHANGE UP (ref 0–0.5)
EOSINOPHIL # BLD AUTO: 0.03 K/UL — SIGNIFICANT CHANGE UP (ref 0–0.5)
EOSINOPHIL # BLD AUTO: 0.06 K/UL — SIGNIFICANT CHANGE UP (ref 0–0.5)
EOSINOPHIL NFR BLD AUTO: 0.3 % — SIGNIFICANT CHANGE UP (ref 0–6)
EOSINOPHIL NFR BLD AUTO: 0.3 % — SIGNIFICANT CHANGE UP (ref 0–6)
EOSINOPHIL NFR BLD AUTO: 0.5 % — SIGNIFICANT CHANGE UP (ref 0–6)
FIBRINOGEN PPP-MCNC: 578 MG/DL — HIGH (ref 200–445)
GLUCOSE SERPL-MCNC: 120 MG/DL — HIGH (ref 70–99)
HCT VFR BLD CALC: 22.9 % — LOW (ref 34.5–45)
HCT VFR BLD CALC: 23.1 % — LOW (ref 34.5–45)
HCT VFR BLD CALC: 23.8 % — LOW (ref 34.5–45)
HGB BLD-MCNC: 7.4 G/DL — LOW (ref 11.5–15.5)
HGB BLD-MCNC: 7.5 G/DL — LOW (ref 11.5–15.5)
HGB BLD-MCNC: 8 G/DL — LOW (ref 11.5–15.5)
IMM GRANULOCYTES NFR BLD AUTO: 0.5 % — SIGNIFICANT CHANGE UP (ref 0–0.9)
IMM GRANULOCYTES NFR BLD AUTO: 0.9 % — SIGNIFICANT CHANGE UP (ref 0–0.9)
IMM GRANULOCYTES NFR BLD AUTO: 1 % — HIGH (ref 0–0.9)
INR BLD: 0.91 RATIO — SIGNIFICANT CHANGE UP (ref 0.85–1.18)
LDH SERPL L TO P-CCNC: 218 U/L — SIGNIFICANT CHANGE UP (ref 50–242)
LYMPHOCYTES # BLD AUTO: 0.96 K/UL — LOW (ref 1–3.3)
LYMPHOCYTES # BLD AUTO: 1.09 K/UL — SIGNIFICANT CHANGE UP (ref 1–3.3)
LYMPHOCYTES # BLD AUTO: 1.24 K/UL — SIGNIFICANT CHANGE UP (ref 1–3.3)
LYMPHOCYTES # BLD AUTO: 10.8 % — LOW (ref 13–44)
LYMPHOCYTES # BLD AUTO: 8.2 % — LOW (ref 13–44)
LYMPHOCYTES # BLD AUTO: 9.9 % — LOW (ref 13–44)
MCHC RBC-ENTMCNC: 30 PG — SIGNIFICANT CHANGE UP (ref 27–34)
MCHC RBC-ENTMCNC: 30.1 PG — SIGNIFICANT CHANGE UP (ref 27–34)
MCHC RBC-ENTMCNC: 30.5 PG — SIGNIFICANT CHANGE UP (ref 27–34)
MCHC RBC-ENTMCNC: 32.3 GM/DL — SIGNIFICANT CHANGE UP (ref 32–36)
MCHC RBC-ENTMCNC: 32.5 GM/DL — SIGNIFICANT CHANGE UP (ref 32–36)
MCHC RBC-ENTMCNC: 33.6 GM/DL — SIGNIFICANT CHANGE UP (ref 32–36)
MCV RBC AUTO: 90.8 FL — SIGNIFICANT CHANGE UP (ref 80–100)
MCV RBC AUTO: 92.7 FL — SIGNIFICANT CHANGE UP (ref 80–100)
MCV RBC AUTO: 92.8 FL — SIGNIFICANT CHANGE UP (ref 80–100)
MONOCYTES # BLD AUTO: 0.58 K/UL — SIGNIFICANT CHANGE UP (ref 0–0.9)
MONOCYTES # BLD AUTO: 0.63 K/UL — SIGNIFICANT CHANGE UP (ref 0–0.9)
MONOCYTES # BLD AUTO: 0.72 K/UL — SIGNIFICANT CHANGE UP (ref 0–0.9)
MONOCYTES NFR BLD AUTO: 4.9 % — SIGNIFICANT CHANGE UP (ref 2–14)
MONOCYTES NFR BLD AUTO: 5.7 % — SIGNIFICANT CHANGE UP (ref 2–14)
MONOCYTES NFR BLD AUTO: 6.3 % — SIGNIFICANT CHANGE UP (ref 2–14)
NEUTROPHILS # BLD AUTO: 10.05 K/UL — HIGH (ref 1.8–7.4)
NEUTROPHILS # BLD AUTO: 9.22 K/UL — HIGH (ref 1.8–7.4)
NEUTROPHILS # BLD AUTO: 9.34 K/UL — HIGH (ref 1.8–7.4)
NEUTROPHILS NFR BLD AUTO: 81.4 % — HIGH (ref 43–77)
NEUTROPHILS NFR BLD AUTO: 83.5 % — HIGH (ref 43–77)
NEUTROPHILS NFR BLD AUTO: 85.5 % — HIGH (ref 43–77)
NRBC # BLD: 0 /100 WBCS — SIGNIFICANT CHANGE UP (ref 0–0)
PLATELET # BLD AUTO: 175 K/UL — SIGNIFICANT CHANGE UP (ref 150–400)
PLATELET # BLD AUTO: 182 K/UL — SIGNIFICANT CHANGE UP (ref 150–400)
PLATELET # BLD AUTO: 184 K/UL — SIGNIFICANT CHANGE UP (ref 150–400)
POTASSIUM SERPL-MCNC: 3.5 MMOL/L — SIGNIFICANT CHANGE UP (ref 3.5–5.3)
POTASSIUM SERPL-SCNC: 3.5 MMOL/L — SIGNIFICANT CHANGE UP (ref 3.5–5.3)
PROT SERPL-MCNC: 5.1 G/DL — LOW (ref 6–8.3)
PROTHROM AB SERPL-ACNC: 10 SEC — SIGNIFICANT CHANGE UP (ref 9.5–13)
RBC # BLD: 2.47 M/UL — LOW (ref 3.8–5.2)
RBC # BLD: 2.49 M/UL — LOW (ref 3.8–5.2)
RBC # BLD: 2.62 M/UL — LOW (ref 3.8–5.2)
RBC # FLD: 15 % — HIGH (ref 10.3–14.5)
RBC # FLD: 15.5 % — HIGH (ref 10.3–14.5)
RBC # FLD: 15.6 % — HIGH (ref 10.3–14.5)
SODIUM SERPL-SCNC: 137 MMOL/L — SIGNIFICANT CHANGE UP (ref 135–145)
URATE SERPL-MCNC: 5.9 MG/DL — SIGNIFICANT CHANGE UP (ref 2.5–7)
WBC # BLD: 11.04 K/UL — HIGH (ref 3.8–10.5)
WBC # BLD: 11.47 K/UL — HIGH (ref 3.8–10.5)
WBC # BLD: 11.75 K/UL — HIGH (ref 3.8–10.5)
WBC # FLD AUTO: 11.04 K/UL — HIGH (ref 3.8–10.5)
WBC # FLD AUTO: 11.47 K/UL — HIGH (ref 3.8–10.5)
WBC # FLD AUTO: 11.75 K/UL — HIGH (ref 3.8–10.5)

## 2024-02-10 PROCEDURE — 99231 SBSQ HOSP IP/OBS SF/LOW 25: CPT

## 2024-02-10 RX ORDER — DIPHENHYDRAMINE HCL 50 MG
25 CAPSULE ORAL ONCE
Refills: 0 | Status: COMPLETED | OUTPATIENT
Start: 2024-02-10 | End: 2024-02-10

## 2024-02-10 RX ORDER — DIPHENHYDRAMINE HCL 50 MG
25 CAPSULE ORAL EVERY 6 HOURS
Refills: 0 | Status: DISCONTINUED | OUTPATIENT
Start: 2024-02-10 | End: 2024-02-14

## 2024-02-10 RX ORDER — IBUPROFEN 200 MG
600 TABLET ORAL EVERY 6 HOURS
Refills: 0 | Status: DISCONTINUED | OUTPATIENT
Start: 2024-02-10 | End: 2024-02-11

## 2024-02-10 RX ORDER — SIMETHICONE 80 MG/1
80 TABLET, CHEWABLE ORAL EVERY 4 HOURS
Refills: 0 | Status: DISCONTINUED | OUTPATIENT
Start: 2024-02-10 | End: 2024-02-14

## 2024-02-10 RX ORDER — FERROUS SULFATE 325(65) MG
325 TABLET ORAL DAILY
Refills: 0 | Status: DISCONTINUED | OUTPATIENT
Start: 2024-02-10 | End: 2024-02-14

## 2024-02-10 RX ORDER — SENNA PLUS 8.6 MG/1
2 TABLET ORAL AT BEDTIME
Refills: 0 | Status: DISCONTINUED | OUTPATIENT
Start: 2024-02-10 | End: 2024-02-14

## 2024-02-10 RX ORDER — FOLIC ACID 0.8 MG
1 TABLET ORAL DAILY
Refills: 0 | Status: DISCONTINUED | OUTPATIENT
Start: 2024-02-10 | End: 2024-02-14

## 2024-02-10 RX ORDER — MAGNESIUM HYDROXIDE 400 MG/1
30 TABLET, CHEWABLE ORAL
Refills: 0 | Status: DISCONTINUED | OUTPATIENT
Start: 2024-02-10 | End: 2024-02-14

## 2024-02-10 RX ORDER — LANOLIN
1 OINTMENT (GRAM) TOPICAL EVERY 6 HOURS
Refills: 0 | Status: DISCONTINUED | OUTPATIENT
Start: 2024-02-10 | End: 2024-02-14

## 2024-02-10 RX ORDER — OXYCODONE HYDROCHLORIDE 5 MG/1
5 TABLET ORAL EVERY 6 HOURS
Refills: 0 | Status: DISCONTINUED | OUTPATIENT
Start: 2024-02-10 | End: 2024-02-14

## 2024-02-10 RX ADMIN — Medication 975 MILLIGRAM(S): at 05:24

## 2024-02-10 RX ADMIN — SENNA PLUS 2 TABLET(S): 8.6 TABLET ORAL at 23:32

## 2024-02-10 RX ADMIN — Medication 975 MILLIGRAM(S): at 11:29

## 2024-02-10 RX ADMIN — Medication 25 MILLIGRAM(S): at 15:29

## 2024-02-10 RX ADMIN — Medication 975 MILLIGRAM(S): at 19:04

## 2024-02-10 RX ADMIN — Medication 60 MILLIGRAM(S): at 18:15

## 2024-02-10 RX ADMIN — Medication 975 MILLIGRAM(S): at 23:31

## 2024-02-10 RX ADMIN — HEPARIN SODIUM 5000 UNIT(S): 5000 INJECTION INTRAVENOUS; SUBCUTANEOUS at 05:24

## 2024-02-10 RX ADMIN — OXYCODONE HYDROCHLORIDE 5 MILLIGRAM(S): 5 TABLET ORAL at 23:31

## 2024-02-10 RX ADMIN — Medication 975 MILLIGRAM(S): at 12:29

## 2024-02-10 RX ADMIN — Medication 975 MILLIGRAM(S): at 18:15

## 2024-02-10 RX ADMIN — Medication 975 MILLIGRAM(S): at 06:10

## 2024-02-10 NOTE — PROGRESS NOTE ADULT - SUBJECTIVE AND OBJECTIVE BOX
OB Progress Note: LTCS, POD#2    S: 39yo POD#2 s/p LTCS. Pain is well controlled. She is tolerating a regular diet and passing flatus. She is voiding spontaneously, and ambulating without difficulty. Denies CP/SOB. Denies lightheadedness, dizziness, or N/V.    O:  Vitals:  Vital Signs Last 24 Hrs  T(C): 36.7 (10 Feb 2024 05:30), Max: 36.7 (10 Feb 2024 05:30)  T(F): 98.1 (10 Feb 2024 05:30), Max: 98.1 (10 Feb 2024 05:30)  HR: 98 (10 Feb 2024 05:30) (71 - 100)  BP: 127/78 (10 Feb 2024 05:30) (110/71 - 134/83)  BP(mean): --  RR: 18 (10 Feb 2024 05:30) (18 - 18)  SpO2: 96% (10 Feb 2024 05:30) (95% - 100%)    Parameters below as of 10 Feb 2024 05:30  Patient On (Oxygen Delivery Method): room air        MEDICATIONS  (STANDING):  acetaminophen     Tablet .. 975 milliGRAM(s) Oral every 6 hours  desmopressin IVPB 20 MICROGram(s) IV Intermittent once  heparin   Injectable 5000 Unit(s) SubCutaneous every 8 hours  HYDROmorphone PCA (1 mG/mL) 30 milliLiter(s) PCA Continuous PCA Continuous  ibuprofen  Tablet. 600 milliGRAM(s) Oral every 6 hours  lactated ringers. 1000 milliLiter(s) (125 mL/Hr) IV Continuous <Continuous>  magnesium sulfate Infusion 2 Gm/Hr (50 mL/Hr) IV Continuous <Continuous>  NIFEdipine XL 60 milliGRAM(s) Oral daily  oxytocin Infusion 333.333 milliUNIT(s)/Min (1000 mL/Hr) IV Continuous <Continuous>      MEDICATIONS  (PRN):  diphenhydrAMINE Injectable 25 milliGRAM(s) IV Push once PRN Allergy symptoms  HYDROmorphone PCA (1 mG/mL) Rescue Clinician Bolus 0.5 milliGRAM(s) IV Push every 15 minutes PRN for Pain Scale GREATER THAN 6  nalbuphine Injectable 2.5 milliGRAM(s) IV Push every 6 hours PRN Pruritus  naloxone Injectable 0.1 milliGRAM(s) IV Push every 3 minutes PRN For ANY of the following changes in patient status:  A. RR LESS THAN 10 breaths per minute, B. Oxygen saturation LESS THAN 90%, C. Sedation score of 6  ondansetron Injectable 4 milliGRAM(s) IV Push every 6 hours PRN Nausea      Labs:  Blood type: AB Positive  Rubella IgG: RPR: Negative                          9.1<L>   11.42<H> >-----------< 177    ( 02-09 @ 05:28 )             28.4<L>                        9.5<L>   10.75<H> >-----------< 164    ( 02-08 @ 21:15 )             29.2<L>                        7.1<L>   8.42 >-----------< 149<L>    ( 02-08 @ 16:55 )             21.8<L>                        10.0<L>   5.87 >-----------< 168    ( 02-08 @ 10:53 )             31.6<L>    02-09-24 @ 05:28      134<L>  |  102  |  10  ----------------------------<  192<H>  4.6   |  20<L>  |  1.11    02-08-24 @ 21:15      138  |  108  |  10  ----------------------------<  131<H>  4.2   |  17<L>  |  1.02    02-08-24 @ 10:53      137  |  105  |  10  ----------------------------<  79  4.1   |  18<L>  |  0.99        Ca    7.8<L>      09 Feb 2024 05:28  Ca    8.3<L>      08 Feb 2024 21:15  Ca    9.2      08 Feb 2024 10:53  Mg     7.3<H>     02-09  Mg     6.2<H>     02-09  Mg     3.3<H>     02-08    TPro  5.1<L>  /  Alb  2.7<L>  /  TBili  0.6  /  DBili  x   /  AST  32  /  ALT  17  /  AlkPhos  113  02-09-24 @ 05:28  TPro  4.9<L>  /  Alb  2.7<L>  /  TBili  0.7  /  DBili  x   /  AST  32  /  ALT  16  /  AlkPhos  119  02-08-24 @ 21:15  TPro  6.9  /  Alb  3.6  /  TBili  0.5  /  DBili  x   /  AST  22  /  ALT  17  /  AlkPhos  168<H>  02-08-24 @ 10:53          PE:  General: NAD  CV: RRR  Pulm: CTAB  Abdomen: Soft, appropriately tender, incision c/d/i.  Extremities: No calf tenderness, no pitting edema  KYARA drain: serosanguinous  Urine: orange-red color

## 2024-02-10 NOTE — PROGRESS NOTE ADULT - SUBJECTIVE AND OBJECTIVE BOX
UROLOGY PA PROGRESS NOTE:     Subjective:  no acute events overnight, pt concerned about increased blood in urine when ambulating. c/o incisional pain, but denies any flank pain b/l    Objective:  Vital signs  T(F): , Max: 98.2 (02-10-24 @ 09:05)  HR: 100 (02-10-24 @ 09:05)  BP: 123/73 (02-10-24 @ 09:05)  SpO2: 96% (02-10-24 @ 09:05)  Wt(kg): --    Output     02-09 @ 07:01  -  02-10 @ 07:00  --------------------------------------------------------  IN: 2400 mL / OUT: 4317 mL / NET: -1917 mL        Physical Exam:  Gen: NAD  Abd: soft, appropriate TTP, incisions CDI, KYARA with serosanguinous output  : +dc with translucent cherry urine draining    Labs:  02-10  11.04 / 23.1  /1.17   02-09  11.42 / 28.4  /1.11                           7.5    11.04 )-----------( 184      ( 10 Feb 2024 06:55 )             23.1     02-10    137  |  102  |  11  ----------------------------<  120<H>  3.5   |  23  |  1.17    Ca    7.3<L>      10 Feb 2024 06:55  Mg     7.3     02-09    TPro  5.1<L>  /  Alb  2.6<L>  /  TBili  0.2  /  DBili  x   /  AST  20  /  ALT  13  /  AlkPhos  109  02-10    PT/INR - ( 10 Feb 2024 07:30 )   PT: 10.0 sec;   INR: 0.91 ratio         PTT - ( 10 Feb 2024 07:30 )  PTT:28.6 sec  Urinalysis Basic - ( 10 Feb 2024 06:55 )    Color: x / Appearance: x / SG: x / pH: x  Gluc: 120 mg/dL / Ketone: x  / Bili: x / Urobili: x   Blood: x / Protein: x / Nitrite: x   Leuk Esterase: x / RBC: x / WBC x   Sq Epi: x / Non Sq Epi: x / Bacteria: x        Urine Cx:

## 2024-02-10 NOTE — CHART NOTE - NSCHARTNOTEFT_GEN_A_CORE
Patient seen and evaluated for Vonvendi administration. 1200u Vonvendi administered via IV over 6 minutes without issue. Patient doing well without complaints. Vitals stable before, during and after administration.     Lot#: [id number]  Exp: 9/11/2025    Yamel Heard PGY4 Patient seen and evaluated for Vonvendi administration. 1250u Vonvendi administered via IV over 6 minutes without issue. Patient doing well without complaints. Vitals stable before, during and after administration.     Lot#: [id number]  Exp: 9/11/2025    Yamel Heard PGY4

## 2024-02-10 NOTE — PROGRESS NOTE ADULT - NS ATTEND AMEND GEN_ALL_CORE FT
S/p cystotomy repair and b/l stents   Cont dc - urine draining clear pink/red  F/u as outpt with Dr Mahan for cystogram and subsequent stent removal

## 2024-02-10 NOTE — CHART NOTE - NSCHARTNOTEFT_GEN_A_CORE
Patient seen for: nutrition consult for GDM postpartum education prior to discharge    Source: patient, EMR, spouse at bedside    Patient is: 39yo [id number] female with PMH of Von Willebrand disease type 2A, GDM, PEC, urinary retention who delivered at 36.5 weeks gestation via c/s with procedure c/b complex cystorraphy & bilateral ureteral stent insertion. Chart reviewed, events noted. Meds/Labs reviewed.  Pt seen at bedside. Confirms diagnosis of GDM during pregnancy but states it was very mild with no medication or dietary changes. Was SMBG 4x daily with levels WDL. Plans to breastfeed as able. Was taking prenatal multivitamin daily PTA; however, states she stopped a few weeks prior to delivery 2/2 constipation. Advised mother that most gummy prenatal multivitamins do not contain iron, which may have been contributing to constipation. Reports NKFA. Denies chewing/swallowing difficulty. No GI distress    Anthropometrics:  Height: 64 inches  Pre-pregnancy weight: 138 pounds   Weight gain: 35 pounds   Admit/Dosing wt: 173 pounds  Nutrition Diagnosis:   Food and Nutrition Related Knowledge Deficit related to limited previous exposure to postpartum GDM nutrition education and recommendations as evidenced by GDM postpartum.    Goal: Pt to state at least two teach back points.    Intervention:  1. Education: Educated patient on need to follow-up for 2-hour oral glucose tolerance test 4-12 weeks after delivery. Advised patient that she no longer needs to check fingersticks at home but that she is at increased risk for developing T2DM due to GDM. Encouraged gradual wt loss with daily physical activity when medically feasible. Reviewed benefits of healthy diet/lifestyle in reducing risk for T2DM. Explained that breastfeeding decreases the risk for developing T2DM. Pt encouraged to continue prenatal MVI while breastfeeding and that she will require more protein/calories/fluid while breastfeeding. Encouraged pt to liberalize diet but continue to include protein with carbohydrates during meals/snacks.   2. Handout: "What to expect now that you have had your baby"     Monitoring:  No other nutrition risks were identified during this encounter.  RD remains available upon request and will follow up per protocol.  Andie Delgado RD CDN; prefer TEAMS Patient seen for: nutrition consult for GDM postpartum education prior to discharge    Source: patient, EMR, spouse at bedside    Patient is: 39yo [id number] female with PMH of Von Willebrand disease type 2A, GDM, PEC, urinary retention who delivered at 36.5 weeks gestation via c/s with procedure c/b complex cystorraphy & bilateral ureteral stent insertion. Chart reviewed, events noted. Meds/Labs reviewed.  Pt seen at bedside. On regular diet; eating well with good appetite. Confirms diagnosis of GDM during pregnancy but states it was very mild with no medication or dietary changes. Was SMBG 4x daily with levels WDL. Plans to breastfeed as able. Was taking prenatal multivitamin daily PTA; however, states she stopped a few weeks prior to delivery 2/2 constipation. Advised mother that most gummy prenatal multivitamins do not contain iron, which may have been contributing to constipation. Reports NKFA. Denies chewing/swallowing difficulty. No GI distress    Anthropometrics:  Height: 64 inches  Pre-pregnancy weight: 138 pounds   Weight gain: 35 pounds   Admit/Dosing wt: 173 pounds  Nutrition Diagnosis:   Food and Nutrition Related Knowledge Deficit related to limited previous exposure to postpartum GDM nutrition education and recommendations as evidenced by GDM postpartum.    Goal: Pt to state at least two teach back points.    Intervention:  1. Education: Educated patient on need to follow-up for 2-hour oral glucose tolerance test 4-12 weeks after delivery. Advised patient that she no longer needs to check fingersticks at home but that she is at increased risk for developing T2DM due to GDM. Encouraged gradual wt loss with daily physical activity when medically feasible. Reviewed benefits of healthy diet/lifestyle in reducing risk for T2DM. Explained that breastfeeding decreases the risk for developing T2DM. Pt encouraged to continue prenatal MVI while breastfeeding and that she will require more protein/calories/fluid while breastfeeding. Encouraged pt to liberalize diet but continue to include protein with carbohydrates during meals/snacks.   2. Handout: "What to expect now that you have had your baby"     Monitoring:  No other nutrition risks were identified during this encounter.  RD remains available upon request and will follow up per protocol.  Andie Delgado RD CDN; prefer TEAMS

## 2024-02-10 NOTE — PROGRESS NOTE ADULT - SUBJECTIVE AND OBJECTIVE BOX
Day __ of Anesthesia Pain Management Service    SUBJECTIVE: Patient is doing well with IV PCA    Pain Scale Score:	[X] Refer to charted pain scores    THERAPY:    [ ] IV PCA Morphine		[ ] 5 mg/mL	[ ] 1 mg/mL  [X] IV PCA Hydromorphone	[ ] 5 mg/mL	[X] 1 mg/mL  [ ] IV PCA Fentanyl		[ ] 50 micrograms/mL    Demand dose: 0.2 mg     Lockout: 6 minutes   Continuous Rate: 0 mg/hr  4 Hour Limit: 4 mg    MEDICATIONS  (STANDING):  acetaminophen     Tablet .. 975 milliGRAM(s) Oral every 6 hours  desmopressin IVPB 20 MICROGram(s) IV Intermittent once  heparin   Injectable 5000 Unit(s) SubCutaneous every 8 hours  HYDROmorphone PCA (1 mG/mL) 30 milliLiter(s) PCA Continuous PCA Continuous  ibuprofen  Tablet. 600 milliGRAM(s) Oral every 6 hours  lactated ringers. 1000 milliLiter(s) (125 mL/Hr) IV Continuous <Continuous>  magnesium sulfate Infusion 2 Gm/Hr (50 mL/Hr) IV Continuous <Continuous>  NIFEdipine XL 60 milliGRAM(s) Oral daily  oxytocin Infusion 333.333 milliUNIT(s)/Min (1000 mL/Hr) IV Continuous <Continuous>    MEDICATIONS  (PRN):  diphenhydrAMINE Injectable 25 milliGRAM(s) IV Push once PRN Allergy symptoms  HYDROmorphone PCA (1 mG/mL) Rescue Clinician Bolus 0.5 milliGRAM(s) IV Push every 15 minutes PRN for Pain Scale GREATER THAN 6  nalbuphine Injectable 2.5 milliGRAM(s) IV Push every 6 hours PRN Pruritus  naloxone Injectable 0.1 milliGRAM(s) IV Push every 3 minutes PRN For ANY of the following changes in patient status:  A. RR LESS THAN 10 breaths per minute, B. Oxygen saturation LESS THAN 90%, C. Sedation score of 6  ondansetron Injectable 4 milliGRAM(s) IV Push every 6 hours PRN Nausea      OBJECTIVE:    Sedation Score:	[ X] Alert	[ ] Drowsy 	[ ] Arousable	[ ] Asleep	[ ] Unresponsive    Side Effects:	[X ] None	[ ] Nausea	[ ] Vomiting	[ ] Pruritus  		[ ] Other:    Vital Signs Last 24 Hrs  T(C): 36.8 (10 Feb 2024 09:05), Max: 36.8 (10 Feb 2024 09:05)  T(F): 98.2 (10 Feb 2024 09:05), Max: 98.2 (10 Feb 2024 09:05)  HR: 100 (10 Feb 2024 09:05) (76 - 100)  BP: 123/73 (10 Feb 2024 09:05) (110/71 - 134/83)  BP(mean): --  RR: 18 (10 Feb 2024 09:05) (18 - 18)  SpO2: 96% (10 Feb 2024 09:05) (95% - 100%)    Parameters below as of 10 Feb 2024 09:05  Patient On (Oxygen Delivery Method): room air        ASSESSMENT/ PLAN    Therapy to  be:               [] Continued   x ] Discontinued   [x ] Changed to PRN Analgesics    Documentation and Verification of current medications:   [X] Done	[ ] Not done, not eligible    Comments:    James Gomez DO  Anesthesia

## 2024-02-10 NOTE — PROGRESS NOTE ADULT - ASSESSMENT
39yo  h/o VwB Type 2A and urinary retention POD#2 s/p pC/S c/b cystotomy with bilateral ureteral stents and KYARA drain s/p 1u PRBC in OR and 2500u VonVendi and TXA. Postpartum she met criteria for pre-eclampsia with severe features requiring Pro 10 and Lab 20/40. On POD#1 blood pressure medication increased to 60XL. HELLP labs wnl although Cr now 1.11. Overnight BP well controlled.    #sPEC  - -120/70-80s, ctm q4hrs  - c/w Procardia 60XL  - Cr: 1.11, will trend  - f/u AM HELLP labs  - s/p Mgx24 hrs postpartum  - Strict I&Os    #Cystotomy s/p cystorraphy  - f/u KUB final. Per urology ureteral stents in correct position  - UOP overnight 2200, adequate  - KYARA drain serosanguinous, output 5 cc  - Appreciate urology recs    #VwB  - c/w VonVendi 1250u q12 hrs x5 days. Push over 6min  - HSQ 5K TID for DVT ppx  - c/w pad counts  - Trend H/H    #Postpartum  - Reg diet  - Encourage ambulation    DUNG Willis, PGY3

## 2024-02-10 NOTE — PROGRESS NOTE ADULT - ASSESSMENT
38y Female s/p complex cystorrhaphy and bilateral ureteral stent insertion, c section    - KYARA cr same as serum, KYARA removed this am   - KUB shows left stent may be positioned low, but patient without any complaints of flank pain, will monitor.  - Keep dc for now, monitor I's and O's & color   - encourage hydration to keep urine dilute and prevent clot formation   - encourage ambulation   - trend H/H, transfuse as needed    38y Female s/p complex cystorrhaphy and bilateral ureteral stent insertion, c section    - KYARA cr same as serum, KYARA removed this am   - KUB shows left stent may be positioned low, but patient without any complaints of flank pain, will monitor.  - Keep dc for now, monitor I's and O's & color   - encourage hydration to keep urine dilute and prevent clot formation   - will keep dc on discharge  - encourage ambulation   - trend H/H, transfuse as needed   - Discussed with Dr. Gordy Murguia [facility]  [address] [place], [place] [id number] [phone]   38y Female s/p complex cystorrhaphy and bilateral ureteral stent insertion, c section    - KYARA cr same as serum, KYARA removed this am   - KUB shows left stent may be positioned low, but patient without any complaints of flank pain, will monitor.  - Keep dc for now, monitor I's and O's & color   - encourage hydration to keep urine dilute and prevent clot formation   - will keep dc on discharge  - encourage ambulation   - trend H/H, transfuse as needed   - Plan to follow up with Dr. Mahan in 7-10 days for in office cystogram and dc removal.   - Then will follow up 2 weeks after that for stent removal in the office  - Dr. mahan will work on arranging those follow ups.   - Please reconsult as needed  - Discussed with Dr. Gordy Murguia [facility]  [address] [place] [id number] [phone]

## 2024-02-11 LAB
HCT VFR BLD CALC: 26.1 % — LOW (ref 34.5–45)
HGB BLD-MCNC: 8.4 G/DL — LOW (ref 11.5–15.5)
MCHC RBC-ENTMCNC: 29.9 PG — SIGNIFICANT CHANGE UP (ref 27–34)
MCHC RBC-ENTMCNC: 32.2 GM/DL — SIGNIFICANT CHANGE UP (ref 32–36)
MCV RBC AUTO: 92.9 FL — SIGNIFICANT CHANGE UP (ref 80–100)
NRBC # BLD: 0 /100 WBCS — SIGNIFICANT CHANGE UP (ref 0–0)
PLATELET # BLD AUTO: 183 K/UL — SIGNIFICANT CHANGE UP (ref 150–400)
RBC # BLD: 2.81 M/UL — LOW (ref 3.8–5.2)
RBC # FLD: 15.5 % — HIGH (ref 10.3–14.5)
WBC # BLD: 11.04 K/UL — HIGH (ref 3.8–10.5)
WBC # FLD AUTO: 11.04 K/UL — HIGH (ref 3.8–10.5)

## 2024-02-11 RX ORDER — ACETAMINOPHEN 500 MG
650 TABLET ORAL EVERY 4 HOURS
Refills: 0 | Status: DISCONTINUED | OUTPATIENT
Start: 2024-02-11 | End: 2024-02-14

## 2024-02-11 RX ORDER — IRON SUCROSE 20 MG/ML
200 INJECTION, SOLUTION INTRAVENOUS ONCE
Refills: 0 | Status: COMPLETED | OUTPATIENT
Start: 2024-02-12 | End: 2024-02-12

## 2024-02-11 RX ORDER — HEPARIN SODIUM 5000 [USP'U]/ML
5000 INJECTION INTRAVENOUS; SUBCUTANEOUS EVERY 12 HOURS
Refills: 0 | Status: DISCONTINUED | OUTPATIENT
Start: 2024-02-11 | End: 2024-02-14

## 2024-02-11 RX ADMIN — Medication 1 MILLIGRAM(S): at 12:02

## 2024-02-11 RX ADMIN — Medication 325 MILLIGRAM(S): at 12:02

## 2024-02-11 RX ADMIN — Medication 1 TABLET(S): at 12:02

## 2024-02-11 RX ADMIN — Medication 975 MILLIGRAM(S): at 12:55

## 2024-02-11 RX ADMIN — Medication 975 MILLIGRAM(S): at 05:53

## 2024-02-11 RX ADMIN — Medication 975 MILLIGRAM(S): at 13:02

## 2024-02-11 RX ADMIN — OXYCODONE HYDROCHLORIDE 5 MILLIGRAM(S): 5 TABLET ORAL at 00:42

## 2024-02-11 RX ADMIN — SIMETHICONE 80 MILLIGRAM(S): 80 TABLET, CHEWABLE ORAL at 00:00

## 2024-02-11 RX ADMIN — DESMOPRESSIN ACETATE 220 MICROGRAM(S): 0.1 TABLET ORAL at 13:45

## 2024-02-11 RX ADMIN — Medication 650 MILLIGRAM(S): at 17:59

## 2024-02-11 RX ADMIN — Medication 650 MILLIGRAM(S): at 22:06

## 2024-02-11 RX ADMIN — Medication 650 MILLIGRAM(S): at 23:00

## 2024-02-11 RX ADMIN — SIMETHICONE 80 MILLIGRAM(S): 80 TABLET, CHEWABLE ORAL at 14:12

## 2024-02-11 RX ADMIN — Medication 60 MILLIGRAM(S): at 17:23

## 2024-02-11 RX ADMIN — OXYCODONE HYDROCHLORIDE 5 MILLIGRAM(S): 5 TABLET ORAL at 23:06

## 2024-02-11 RX ADMIN — Medication 650 MILLIGRAM(S): at 17:23

## 2024-02-11 RX ADMIN — Medication 975 MILLIGRAM(S): at 00:42

## 2024-02-11 NOTE — PROGRESS NOTE ADULT - ASSESSMENT
A/P: 39yo  h/o VwB Type 2A and urinary retention POD#3 s/p pC/S c/b cystotomy with bilateral ureteral stents and KYARA drain s/p 1u PRBC in OR and 2500u VonVendi and TXA. Postpartum she met criteria for pre-eclampsia with severe features requiring Pro 10 and Lab 20/40. On POD#1 blood pressure medication increased to 60XL. HELLP labs wnl although Cr now 1.17. Overnight BP well controlled.    #sPEC  - BP wnl  - c/w Procardia 60XL  - Cr: 1.17, will trend  - f/u AM HELLP labs  - s/p Mgx24 hrs postpartum  - Strict I&Os    #Cystotomy s/p cystorraphy  - KUB: : There are bilateral nephroureteral stents in place. Left nephroureteral stent terminates in the right lower quadrant. A radio dense device overlies the urinary bladder. The bowel gas pattern is nonobstructive.  - UOP overnight adequate  - s/p KYARA drain  - Appreciate urology recs    #VwB  - c/w VonVendi 1250u q12 hrs x5 days. Push over 6min  - HSQ 5K TID for DVT ppx  - c/w pad counts  - Trend H/H    #Postpartum  - Reg diet  - Encourage ambulation    Yisel West MD, PGY-3

## 2024-02-11 NOTE — CHART NOTE - NSCHARTNOTEFT_GEN_A_CORE
Spoke to patient's hematologist Dr Isaacs. Per Dr Isaacs, he would recommend IV Venofer prior to d/c    IV venofer ordered for 2/12 at 8a    discussed w Dr Emely Keane PGY3

## 2024-02-11 NOTE — CHART NOTE - NSCHARTNOTEFT_GEN_A_CORE
Patient seen and evaluated for Vonvendi administration. 1250u Vonvendi in 10cc administered via IV over 6 minutes without issue. Patient doing well without complaints. Vitals stable before, during and after administration.     Lot#: [id number]  Sub Lot # 6  Exp: 9/11/2025    Yisel West MD, PGY-3

## 2024-02-11 NOTE — PROGRESS NOTE ADULT - SUBJECTIVE AND OBJECTIVE BOX
OB Progress Note: pLTCS, POD#3    S: 37yo POD#3 s/p pLTCS. Pain is well controlled. She is tolerating a regular diet and passing flatus. She is voiding spontaneously, and ambulating without difficulty. Denies CP/SOB. Denies lightheadedness, dizziness, or N/V. Patient denies HA, SOB, changes or spots in vision, new swelling in the hands and face, or RUQ/epigastric pain. Patient declined heparin overnight.    O:  Vitals:  Vital Signs Last 24 Hrs  T(C): 36.8 (11 Feb 2024 08:35), Max: 37.1 (10 Feb 2024 14:50)  T(F): 98.2 (11 Feb 2024 08:35), Max: 98.8 (10 Feb 2024 15:01)  HR: 87 (11 Feb 2024 08:35) (82 - 116)  BP: 122/77 (11 Feb 2024 08:35) (112/70 - 141/87)  RR: 18 (11 Feb 2024 08:35) (18 - 18)  SpO2: 98% (11 Feb 2024 08:35) (95% - 98%)    Parameters below as of 11 Feb 2024 08:35  Patient On (Oxygen Delivery Method): room air            10 Feb 2024 07:01  -  11 Feb 2024 07:00  --------------------------------------------------------  IN:  Total IN: 0 mL    OUT:    Indwelling Catheter - Urethral (mL): 7600 mL  Total OUT: 7600 mL    Total NET: -7600 mL      MEDICATIONS  (STANDING):  acetaminophen     Tablet .. 975 milliGRAM(s) Oral every 6 hours  desmopressin IVPB 20 MICROGram(s) IV Intermittent once  ferrous    sulfate 325 milliGRAM(s) Oral daily  folic acid 1 milliGRAM(s) Oral daily  heparin   Injectable 5000 Unit(s) SubCutaneous every 8 hours  ibuprofen  Tablet. 600 milliGRAM(s) Oral every 6 hours  ibuprofen  Tablet. 600 milliGRAM(s) Oral every 6 hours  lactated ringers. 1000 milliLiter(s) (125 mL/Hr) IV Continuous <Continuous>  magnesium sulfate Infusion 2 Gm/Hr (50 mL/Hr) IV Continuous <Continuous>  NIFEdipine XL 60 milliGRAM(s) Oral daily  oxytocin Infusion 333.333 milliUNIT(s)/Min (1000 mL/Hr) IV Continuous <Continuous>  prenatal multivitamin 1 Tablet(s) Oral daily  senna 2 Tablet(s) Oral at bedtime    MEDICATIONS  (PRN):  diphenhydrAMINE 25 milliGRAM(s) Oral every 6 hours PRN Pruritus  diphenhydrAMINE Injectable 25 milliGRAM(s) IV Push once PRN Allergy symptoms  lanolin Ointment 1 Application(s) Topical every 6 hours PRN Sore Nipples  magnesium hydroxide Suspension 30 milliLiter(s) Oral two times a day PRN Constipation  oxyCODONE    IR 5 milliGRAM(s) Oral every 6 hours PRN Severe Pain (7 - 10)  simethicone 80 milliGRAM(s) Chew every 4 hours PRN Gas      Labs:  Blood type: AB Positive  Rubella IgG: RPR: Negative                          8.4<L>   11.04<H> >-----------< 183    ( 02-11 @ 07:30 )             26.1<L>                        8.0<L>   11.47<H> >-----------< 175    ( 02-10 @ 22:33 )             23.8<L>                        7.4<L>   11.75<H> >-----------< 182    ( 02-10 @ 13:03 )             22.9<L>                        7.5<L>   11.04<H> >-----------< 184    ( 02-10 @ 06:55 )             23.1<L>                        9.1<L>   11.42<H> >-----------< 177    ( 02-09 @ 05:28 )             28.4<L>                        9.5<L>   10.75<H> >-----------< 164    ( 02-08 @ 21:15 )             29.2<L>                        7.1<L>   8.42 >-----------< 149<L>    ( 02-08 @ 16:55 )             21.8<L>                        10.0<L>   5.87 >-----------< 168    ( 02-08 @ 10:53 )             31.6<L>    02-10-24 @ 06:55      137  |  102  |  11  ----------------------------<  120<H>  3.5   |  23  |  1.17    02-09-24 @ 05:28      134<L>  |  102  |  10  ----------------------------<  192<H>  4.6   |  20<L>  |  1.11    02-08-24 @ 21:15      138  |  108  |  10  ----------------------------<  131<H>  4.2   |  17<L>  |  1.02    02-08-24 @ 10:53      137  |  105  |  10  ----------------------------<  79  4.1   |  18<L>  |  0.99        Ca    7.3<L>      10 Feb 2024 06:55  Ca    7.8<L>      09 Feb 2024 05:28  Ca    8.3<L>      08 Feb 2024 21:15  Ca    9.2      08 Feb 2024 10:53  Mg     7.3<H>     02-09  Mg     6.2<H>     02-09  Mg     3.3<H>     02-08    TPro  5.1<L>  /  Alb  2.6<L>  /  TBili  0.2  /  DBili  x   /  AST  20  /  ALT  13  /  AlkPhos  109  02-10-24 @ 06:55  TPro  5.1<L>  /  Alb  2.7<L>  /  TBili  0.6  /  DBili  x   /  AST  32  /  ALT  17  /  AlkPhos  113  02-09-24 @ 05:28  TPro  4.9<L>  /  Alb  2.7<L>  /  TBili  0.7  /  DBili  x   /  AST  32  /  ALT  16  /  AlkPhos  119  02-08-24 @ 21:15  TPro  6.9  /  Alb  3.6  /  TBili  0.5  /  DBili  x   /  AST  22  /  ALT  17  /  AlkPhos  168<H>  02-08-24 @ 10:53          PE:  General: NAD  CV: RRR  Pulm: CTAB  Abdomen: Soft, appropriately tender, incision c/d/i.  Extremities: No calf tenderness, swelling noted though no pitting edema  KYARA drain: serosanguinous  Urine: punch red color

## 2024-02-12 LAB
ALBUMIN SERPL ELPH-MCNC: 2.7 G/DL — LOW (ref 3.3–5)
ALP SERPL-CCNC: 97 U/L — SIGNIFICANT CHANGE UP (ref 40–120)
ALT FLD-CCNC: 44 U/L — SIGNIFICANT CHANGE UP (ref 10–45)
ANION GAP SERPL CALC-SCNC: 6 MMOL/L — SIGNIFICANT CHANGE UP (ref 5–17)
AST SERPL-CCNC: 49 U/L — HIGH (ref 10–40)
BASOPHILS # BLD AUTO: 0.02 K/UL — SIGNIFICANT CHANGE UP (ref 0–0.2)
BASOPHILS NFR BLD AUTO: 0.2 % — SIGNIFICANT CHANGE UP (ref 0–2)
BILIRUB SERPL-MCNC: 0.3 MG/DL — SIGNIFICANT CHANGE UP (ref 0.2–1.2)
BUN SERPL-MCNC: 9 MG/DL — SIGNIFICANT CHANGE UP (ref 7–23)
CALCIUM SERPL-MCNC: 8.7 MG/DL — SIGNIFICANT CHANGE UP (ref 8.4–10.5)
CHLORIDE SERPL-SCNC: 106 MMOL/L — SIGNIFICANT CHANGE UP (ref 96–108)
CO2 SERPL-SCNC: 28 MMOL/L — SIGNIFICANT CHANGE UP (ref 22–31)
CREAT SERPL-MCNC: 0.65 MG/DL — SIGNIFICANT CHANGE UP (ref 0.5–1.3)
EGFR: 117 ML/MIN/1.73M2 — SIGNIFICANT CHANGE UP
EOSINOPHIL # BLD AUTO: 0.42 K/UL — SIGNIFICANT CHANGE UP (ref 0–0.5)
EOSINOPHIL NFR BLD AUTO: 4.5 % — SIGNIFICANT CHANGE UP (ref 0–6)
GLUCOSE SERPL-MCNC: 78 MG/DL — SIGNIFICANT CHANGE UP (ref 70–99)
HCT VFR BLD CALC: 27.5 % — LOW (ref 34.5–45)
HGB BLD-MCNC: 8.6 G/DL — LOW (ref 11.5–15.5)
IMM GRANULOCYTES NFR BLD AUTO: 0.9 % — SIGNIFICANT CHANGE UP (ref 0–0.9)
LYMPHOCYTES # BLD AUTO: 1.65 K/UL — SIGNIFICANT CHANGE UP (ref 1–3.3)
LYMPHOCYTES # BLD AUTO: 17.5 % — SIGNIFICANT CHANGE UP (ref 13–44)
MCHC RBC-ENTMCNC: 29.5 PG — SIGNIFICANT CHANGE UP (ref 27–34)
MCHC RBC-ENTMCNC: 31.3 GM/DL — LOW (ref 32–36)
MCV RBC AUTO: 94.2 FL — SIGNIFICANT CHANGE UP (ref 80–100)
MONOCYTES # BLD AUTO: 0.65 K/UL — SIGNIFICANT CHANGE UP (ref 0–0.9)
MONOCYTES NFR BLD AUTO: 6.9 % — SIGNIFICANT CHANGE UP (ref 2–14)
NEUTROPHILS # BLD AUTO: 6.59 K/UL — SIGNIFICANT CHANGE UP (ref 1.8–7.4)
NEUTROPHILS NFR BLD AUTO: 70 % — SIGNIFICANT CHANGE UP (ref 43–77)
NRBC # BLD: 0 /100 WBCS — SIGNIFICANT CHANGE UP (ref 0–0)
PLATELET # BLD AUTO: 218 K/UL — SIGNIFICANT CHANGE UP (ref 150–400)
POTASSIUM SERPL-MCNC: 3.3 MMOL/L — LOW (ref 3.5–5.3)
POTASSIUM SERPL-SCNC: 3.3 MMOL/L — LOW (ref 3.5–5.3)
PROT SERPL-MCNC: 5.6 G/DL — LOW (ref 6–8.3)
RBC # BLD: 2.92 M/UL — LOW (ref 3.8–5.2)
RBC # FLD: 15.3 % — HIGH (ref 10.3–14.5)
SODIUM SERPL-SCNC: 140 MMOL/L — SIGNIFICANT CHANGE UP (ref 135–145)
VWF:RCO ACT/NOR PPP PL AGG: 214 % — HIGH (ref 45–133)
VWF:RCO ACT/NOR PPP PL AGG: 222 % — HIGH (ref 45–133)
VWF:RCO ACT/NOR PPP PL AGG: 236 % — HIGH (ref 45–133)
WBC # BLD: 9.41 K/UL — SIGNIFICANT CHANGE UP (ref 3.8–10.5)
WBC # FLD AUTO: 9.41 K/UL — SIGNIFICANT CHANGE UP (ref 3.8–10.5)

## 2024-02-12 RX ORDER — POTASSIUM CHLORIDE 20 MEQ
40 PACKET (EA) ORAL EVERY 4 HOURS
Refills: 0 | Status: COMPLETED | OUTPATIENT
Start: 2024-02-12 | End: 2024-02-12

## 2024-02-12 RX ORDER — POLYETHYLENE GLYCOL 3350 17 G/17G
17 POWDER, FOR SOLUTION ORAL DAILY
Refills: 0 | Status: DISCONTINUED | OUTPATIENT
Start: 2024-02-12 | End: 2024-02-14

## 2024-02-12 RX ORDER — SENNA PLUS 8.6 MG/1
2 TABLET ORAL AT BEDTIME
Refills: 0 | Status: DISCONTINUED | OUTPATIENT
Start: 2024-02-12 | End: 2024-02-12

## 2024-02-12 RX ADMIN — Medication 1 TABLET(S): at 12:09

## 2024-02-12 RX ADMIN — Medication 650 MILLIGRAM(S): at 13:06

## 2024-02-12 RX ADMIN — OXYCODONE HYDROCHLORIDE 5 MILLIGRAM(S): 5 TABLET ORAL at 00:00

## 2024-02-12 RX ADMIN — Medication 650 MILLIGRAM(S): at 02:37

## 2024-02-12 RX ADMIN — Medication 40 MILLIEQUIVALENT(S): at 09:29

## 2024-02-12 RX ADMIN — POLYETHYLENE GLYCOL 3350 17 GRAM(S): 17 POWDER, FOR SOLUTION ORAL at 14:35

## 2024-02-12 RX ADMIN — MAGNESIUM HYDROXIDE 30 MILLILITER(S): 400 TABLET, CHEWABLE ORAL at 19:59

## 2024-02-12 RX ADMIN — Medication 325 MILLIGRAM(S): at 12:09

## 2024-02-12 RX ADMIN — Medication 650 MILLIGRAM(S): at 08:45

## 2024-02-12 RX ADMIN — Medication 650 MILLIGRAM(S): at 02:07

## 2024-02-12 RX ADMIN — Medication 60 MILLIGRAM(S): at 17:53

## 2024-02-12 RX ADMIN — IRON SUCROSE 110 MILLIGRAM(S): 20 INJECTION, SOLUTION INTRAVENOUS at 08:51

## 2024-02-12 RX ADMIN — Medication 40 MILLIEQUIVALENT(S): at 13:45

## 2024-02-12 RX ADMIN — Medication 650 MILLIGRAM(S): at 21:00

## 2024-02-12 RX ADMIN — Medication 650 MILLIGRAM(S): at 07:52

## 2024-02-12 RX ADMIN — Medication 1 MILLIGRAM(S): at 12:08

## 2024-02-12 RX ADMIN — Medication 650 MILLIGRAM(S): at 20:00

## 2024-02-12 RX ADMIN — Medication 650 MILLIGRAM(S): at 12:09

## 2024-02-12 RX ADMIN — SIMETHICONE 80 MILLIGRAM(S): 80 TABLET, CHEWABLE ORAL at 13:54

## 2024-02-12 RX ADMIN — SENNA PLUS 2 TABLET(S): 8.6 TABLET ORAL at 20:00

## 2024-02-12 NOTE — PROGRESS NOTE ADULT - SUBJECTIVE AND OBJECTIVE BOX
OB Postpartum Note:  Delivery, POD#4    S: 39yo h/o VwB POD#4 s/p LTCS. The patient feels well.  Pain is well controlled. She is tolerating a regular diet and passing flatus. She is voiding spontaneously, and ambulating without difficulty. Denies CP/SOB. Denies lightheadedness, dizziness, or  N/V.    O:  Vitals:  Vital Signs Last 24 Hrs  T(C): 36.6 (2024 02:00), Max: 36.8 (2024 08:35)  T(F): 97.8 (2024 02:00), Max: 98.2 (2024 08:35)  HR: 91 (2024 02:18) (83 - 112)  BP: 108/72 (2024 02:18) (108/72 - 129/85)  BP(mean): --  RR: 18 (2024 02:18) (18 - 18)  SpO2: 97% (2024 02:18) (97% - 99%)    Parameters below as of 2024 02:18  Patient On (Oxygen Delivery Method): room air        MEDICATIONS  (STANDING):  acetaminophen     Tablet .. 650 milliGRAM(s) Oral every 4 hours  acetaminophen     Tablet .. 975 milliGRAM(s) Oral every 6 hours  ferrous    sulfate 325 milliGRAM(s) Oral daily  folic acid 1 milliGRAM(s) Oral daily  heparin   Injectable 5000 Unit(s) SubCutaneous every 12 hours  ibuprofen  Tablet. 600 milliGRAM(s) Oral every 6 hours  iron sucrose IVPB 200 milliGRAM(s) IV Intermittent once  lactated ringers. 1000 milliLiter(s) (125 mL/Hr) IV Continuous <Continuous>  magnesium sulfate Infusion 2 Gm/Hr (50 mL/Hr) IV Continuous <Continuous>  NIFEdipine XL 60 milliGRAM(s) Oral daily  oxytocin Infusion 333.333 milliUNIT(s)/Min (1000 mL/Hr) IV Continuous <Continuous>  prenatal multivitamin 1 Tablet(s) Oral daily  senna 2 Tablet(s) Oral at bedtime    MEDICATIONS  (PRN):  diphenhydrAMINE 25 milliGRAM(s) Oral every 6 hours PRN Pruritus  diphenhydrAMINE Injectable 25 milliGRAM(s) IV Push once PRN Allergy symptoms  lanolin Ointment 1 Application(s) Topical every 6 hours PRN Sore Nipples  magnesium hydroxide Suspension 30 milliLiter(s) Oral two times a day PRN Constipation  oxyCODONE    IR 5 milliGRAM(s) Oral every 6 hours PRN Severe Pain (7 - 10)  simethicone 80 milliGRAM(s) Chew every 4 hours PRN Gas      LABS:  Blood type: AB Positive  Rubella IgG: RPR: Negative                          8.4<L>   11.04<H> >-----------< 183    (  @ 07:30 )             26.1<L>                        8.0<L>   11.47<H> >-----------< 175    ( 02-10 @ 22:33 )             23.8<L>                        7.4<L>   11.75<H> >-----------< 182    ( 02-10 @ 13:03 )             22.9<L>                        7.5<L>   11.04<H> >-----------< 184    ( 02-10 @ 06:55 )             23.1<L>                        9.1<L>   11.42<H> >-----------< 177    (  @ 05:28 )             28.4<L>    02-10-24 @ 06:55      137  |  102  |  11  ----------------------------<  120<H>  3.5   |  23  |  1.17    24 @ 05:28      134<L>  |  102  |  10  ----------------------------<  192<H>  4.6   |  20<L>  |  1.11        Ca    7.3<L>      10 Feb 2024 06:55  Ca    7.8<L>      2024 05:28  Mg     7.3<H>       Mg     6.2<H>         TPro  5.1<L>  /  Alb  2.6<L>  /  TBili  0.2  /  DBili  x   /  AST  20  /  ALT  13  /  AlkPhos  109  02-10-24 @ 06:55  TPro  5.1<L>  /  Alb  2.7<L>  /  TBili  0.6  /  DBili  x   /  AST  32  /  ALT  17  /  AlkPhos  113  24 @ 05:28          Physical exam:  Gen: NAD  CV: RRR  Pulm: CTAB  Abdomen: Soft, nontender, no distension , firm uterine fundus at umbilicus.  Incision: Clean, dry, and intact   Pelvic: Normal lochia noted  Ext: No calf tenderness or edema       OB Postpartum Note:  Delivery, POD#4    S: 39yo h/o VwB POD#4 s/p LTCS. The patient feels well.  Pain is well controlled. She is tolerating a regular diet and passing flatus. She is ambulating without difficulty. Denies CP/SOB. Denies lightheadedness, dizziness, or  N/V.    O:  Vitals:  Vital Signs Last 24 Hrs  T(C): 36.6 (2024 02:00), Max: 36.8 (2024 08:35)  T(F): 97.8 (2024 02:00), Max: 98.2 (2024 08:35)  HR: 91 (2024 02:18) (83 - 112)  BP: 108/72 (2024 02:18) (108/72 - 129/85)  BP(mean): --  RR: 18 (2024 02:18) (18 - 18)  SpO2: 97% (2024 02:18) (97% - 99%)    Parameters below as of 2024 02:18  Patient On (Oxygen Delivery Method): room air        MEDICATIONS  (STANDING):  acetaminophen     Tablet .. 650 milliGRAM(s) Oral every 4 hours  acetaminophen     Tablet .. 975 milliGRAM(s) Oral every 6 hours  ferrous    sulfate 325 milliGRAM(s) Oral daily  folic acid 1 milliGRAM(s) Oral daily  heparin   Injectable 5000 Unit(s) SubCutaneous every 12 hours  ibuprofen  Tablet. 600 milliGRAM(s) Oral every 6 hours  iron sucrose IVPB 200 milliGRAM(s) IV Intermittent once  lactated ringers. 1000 milliLiter(s) (125 mL/Hr) IV Continuous <Continuous>  magnesium sulfate Infusion 2 Gm/Hr (50 mL/Hr) IV Continuous <Continuous>  NIFEdipine XL 60 milliGRAM(s) Oral daily  oxytocin Infusion 333.333 milliUNIT(s)/Min (1000 mL/Hr) IV Continuous <Continuous>  prenatal multivitamin 1 Tablet(s) Oral daily  senna 2 Tablet(s) Oral at bedtime    MEDICATIONS  (PRN):  diphenhydrAMINE 25 milliGRAM(s) Oral every 6 hours PRN Pruritus  diphenhydrAMINE Injectable 25 milliGRAM(s) IV Push once PRN Allergy symptoms  lanolin Ointment 1 Application(s) Topical every 6 hours PRN Sore Nipples  magnesium hydroxide Suspension 30 milliLiter(s) Oral two times a day PRN Constipation  oxyCODONE    IR 5 milliGRAM(s) Oral every 6 hours PRN Severe Pain (7 - 10)  simethicone 80 milliGRAM(s) Chew every 4 hours PRN Gas      LABS:  Blood type: AB Positive  Rubella IgG: RPR: Negative                          8.4<L>   11.04<H> >-----------< 183    (  @ 07:30 )             26.1<L>                        8.0<L>   11.47<H> >-----------< 175    ( 02-10 @ 22:33 )             23.8<L>                        7.4<L>   11.75<H> >-----------< 182    ( 02-10 @ 13:03 )             22.9<L>                        7.5<L>   11.04<H> >-----------< 184    ( 02-10 @ 06:55 )             23.1<L>                        9.1<L>   11.42<H> >-----------< 177    (  @ 05:28 )             28.4<L>    02-10-24 @ 06:55      137  |  102  |  11  ----------------------------<  120<H>  3.5   |  23  |  1.17    24 @ 05:28      134<L>  |  102  |  10  ----------------------------<  192<H>  4.6   |  20<L>  |  1.11        Ca    7.3<L>      10 Feb 2024 06:55  Ca    7.8<L>      2024 05:28  Mg     7.3<H>       Mg     6.2<H>         TPro  5.1<L>  /  Alb  2.6<L>  /  TBili  0.2  /  DBili  x   /  AST  20  /  ALT  13  /  AlkPhos  109  02-10-24 @ 06:55  TPro  5.1<L>  /  Alb  2.7<L>  /  TBili  0.6  /  DBili  x   /  AST  32  /  ALT  17  /  AlkPhos  113  24 @ 05:28          Physical exam:  Gen: NAD  CV: RRR  Pulm: CTAB  Abdomen: Soft, nontender, no distension , firm uterine fundus at umbilicus.  Incision: Clean, dry, and intact   Pelvic: Normal lochia noted  Ext: No calf tenderness or edema

## 2024-02-12 NOTE — CHART NOTE - NSCHARTNOTEFT_GEN_A_CORE
Vonvendi 1250u pushed over 6 minutes at 1:45a. Patient's tolerated injection well. Vital signs normal and stable during this time.    - c/w Vonvendi 1250u q12 x5 days per hematology recommendations    DUNG Willis, PGY3

## 2024-02-12 NOTE — PROGRESS NOTE ADULT - ASSESSMENT
A/P: 39yo  h/o VwB Type 2A and urinary retention POD#4 s/p pC/S c/b cystotomy with bilateral ureteral stents and KYARA drain s/p 1u PRBC in OR and 2500u VonVendi and TXA. Postpartum she met criteria for pre-eclampsia with severe features requiring Pro 10 and Lab 20/40. On POD#1 blood pressure medication increased to 60XL. HELLP labs wnl although Cr now 1.17. Overnight BP well controlled.    #sPEC  - -120/70-80s, ctm q4 hrs  - c/w Procardia 60XL  - Cr: 1.17, will trend  - f/u AM CMP  - s/p Mgx24 hrs postpartum  - Strict I&Os    #Cystotomy s/p cystorraphy  - KUB: : There are bilateral nephroureteral stents in place. Left nephroureteral stent terminates in the right lower quadrant. A radio dense device overlies the urinary bladder. The bowel gas pattern is nonobstructive.  - UOP overnight adequate  - s/p KYARA drain  - Appreciate urology recs    #VwB  - c/w VonVendi 1250u q12 hrs x5 days. Push over 6min  - HSQ 5K TID for DVT ppx  - c/w pad counts  - Trend H/H and VonWillebrand activity    #Postpartum  - Reg diet  - Encourage ambulation    DUNG Willis, PGY3

## 2024-02-13 PROBLEM — R33.9 RETENTION OF URINE, UNSPECIFIED: Chronic | Status: ACTIVE | Noted: 2024-02-05

## 2024-02-13 PROBLEM — O24.419 GESTATIONAL DIABETES MELLITUS IN PREGNANCY, UNSPECIFIED CONTROL: Chronic | Status: ACTIVE | Noted: 2024-02-05

## 2024-02-13 PROBLEM — E03.9 HYPOTHYROIDISM, UNSPECIFIED: Chronic | Status: ACTIVE | Noted: 2024-02-05

## 2024-02-13 PROBLEM — O14.90 UNSPECIFIED PRE-ECLAMPSIA, UNSPECIFIED TRIMESTER: Chronic | Status: ACTIVE | Noted: 2024-02-05

## 2024-02-13 LAB
ALBUMIN SERPL ELPH-MCNC: 3 G/DL — LOW (ref 3.3–5)
ALP SERPL-CCNC: 101 U/L — SIGNIFICANT CHANGE UP (ref 40–120)
ALT FLD-CCNC: 33 U/L — SIGNIFICANT CHANGE UP (ref 10–45)
ANION GAP SERPL CALC-SCNC: 11 MMOL/L — SIGNIFICANT CHANGE UP (ref 5–17)
AST SERPL-CCNC: 20 U/L — SIGNIFICANT CHANGE UP (ref 10–40)
BASOPHILS # BLD AUTO: 0.03 K/UL — SIGNIFICANT CHANGE UP (ref 0–0.2)
BASOPHILS NFR BLD AUTO: 0.3 % — SIGNIFICANT CHANGE UP (ref 0–2)
BILIRUB SERPL-MCNC: 0.3 MG/DL — SIGNIFICANT CHANGE UP (ref 0.2–1.2)
BUN SERPL-MCNC: 10 MG/DL — SIGNIFICANT CHANGE UP (ref 7–23)
CALCIUM SERPL-MCNC: 9.3 MG/DL — SIGNIFICANT CHANGE UP (ref 8.4–10.5)
CHLORIDE SERPL-SCNC: 103 MMOL/L — SIGNIFICANT CHANGE UP (ref 96–108)
CO2 SERPL-SCNC: 24 MMOL/L — SIGNIFICANT CHANGE UP (ref 22–31)
CREAT SERPL-MCNC: 0.59 MG/DL — SIGNIFICANT CHANGE UP (ref 0.5–1.3)
EGFR: 120 ML/MIN/1.73M2 — SIGNIFICANT CHANGE UP
EOSINOPHIL # BLD AUTO: 0.4 K/UL — SIGNIFICANT CHANGE UP (ref 0–0.5)
EOSINOPHIL NFR BLD AUTO: 4.6 % — SIGNIFICANT CHANGE UP (ref 0–6)
GLUCOSE SERPL-MCNC: 139 MG/DL — HIGH (ref 70–99)
HCT VFR BLD CALC: 30.1 % — LOW (ref 34.5–45)
HGB BLD-MCNC: 9.6 G/DL — LOW (ref 11.5–15.5)
IMM GRANULOCYTES NFR BLD AUTO: 1.8 % — HIGH (ref 0–0.9)
LYMPHOCYTES # BLD AUTO: 1.28 K/UL — SIGNIFICANT CHANGE UP (ref 1–3.3)
LYMPHOCYTES # BLD AUTO: 14.7 % — SIGNIFICANT CHANGE UP (ref 13–44)
MCHC RBC-ENTMCNC: 29.7 PG — SIGNIFICANT CHANGE UP (ref 27–34)
MCHC RBC-ENTMCNC: 31.9 GM/DL — LOW (ref 32–36)
MCV RBC AUTO: 93.2 FL — SIGNIFICANT CHANGE UP (ref 80–100)
MONOCYTES # BLD AUTO: 0.51 K/UL — SIGNIFICANT CHANGE UP (ref 0–0.9)
MONOCYTES NFR BLD AUTO: 5.9 % — SIGNIFICANT CHANGE UP (ref 2–14)
NEUTROPHILS # BLD AUTO: 6.31 K/UL — SIGNIFICANT CHANGE UP (ref 1.8–7.4)
NEUTROPHILS NFR BLD AUTO: 72.7 % — SIGNIFICANT CHANGE UP (ref 43–77)
NRBC # BLD: 0 /100 WBCS — SIGNIFICANT CHANGE UP (ref 0–0)
PLATELET # BLD AUTO: 306 K/UL — SIGNIFICANT CHANGE UP (ref 150–400)
POTASSIUM SERPL-MCNC: 3.7 MMOL/L — SIGNIFICANT CHANGE UP (ref 3.5–5.3)
POTASSIUM SERPL-SCNC: 3.7 MMOL/L — SIGNIFICANT CHANGE UP (ref 3.5–5.3)
PROT SERPL-MCNC: 6.2 G/DL — SIGNIFICANT CHANGE UP (ref 6–8.3)
RBC # BLD: 3.23 M/UL — LOW (ref 3.8–5.2)
RBC # FLD: 15.1 % — HIGH (ref 10.3–14.5)
SODIUM SERPL-SCNC: 138 MMOL/L — SIGNIFICANT CHANGE UP (ref 135–145)
WBC # BLD: 8.69 K/UL — SIGNIFICANT CHANGE UP (ref 3.8–10.5)
WBC # FLD AUTO: 8.69 K/UL — SIGNIFICANT CHANGE UP (ref 3.8–10.5)

## 2024-02-13 RX ADMIN — Medication 650 MILLIGRAM(S): at 02:05

## 2024-02-13 RX ADMIN — Medication 650 MILLIGRAM(S): at 09:52

## 2024-02-13 RX ADMIN — Medication 650 MILLIGRAM(S): at 23:19

## 2024-02-13 RX ADMIN — Medication 650 MILLIGRAM(S): at 03:05

## 2024-02-13 RX ADMIN — Medication 650 MILLIGRAM(S): at 18:32

## 2024-02-13 RX ADMIN — Medication 60 MILLIGRAM(S): at 17:21

## 2024-02-13 RX ADMIN — SENNA PLUS 2 TABLET(S): 8.6 TABLET ORAL at 22:19

## 2024-02-13 RX ADMIN — Medication 650 MILLIGRAM(S): at 22:19

## 2024-02-13 RX ADMIN — Medication 650 MILLIGRAM(S): at 14:51

## 2024-02-13 RX ADMIN — Medication 650 MILLIGRAM(S): at 06:02

## 2024-02-13 RX ADMIN — Medication 650 MILLIGRAM(S): at 13:45

## 2024-02-13 RX ADMIN — Medication 650 MILLIGRAM(S): at 07:02

## 2024-02-13 RX ADMIN — Medication 650 MILLIGRAM(S): at 09:45

## 2024-02-13 NOTE — CHART NOTE - NSCHARTNOTESELECT_GEN_ALL_CORE
[facility]
Corazon/Event Note
Event Note
[facility]
Corazon/Event Note
Event Note
Nutrition Services
R3 Update in Care
Rosa M/Event Note

## 2024-02-13 NOTE — PROGRESS NOTE ADULT - ATTENDING COMMENTS
Pt is s/p  section. She is doing well without complaints. Denies HA, lightheadedness, dizziness, CP, SOB, palpitations, abdominal pain, heavy vaginal bleeding.     T(C): 36.8 (24 @ 08:35), Max: 37.1 (02-10-24 @ 14:50)  HR: 87 (24 @ 08:35) (82 - 116)  BP: 122/77 (24 @ 08:35) (112/70 - 141/87)  RR: 18 (24 @ 08:35) (18 - 18)  SpO2: 98% (24 @ 08:35) (95% - 98%)    Physical exam:   Abd: NTND, fundus firm and well contracted, incision CDI  VE: Appropriate vaginal bleeding    Plan  -Cont Recomb vwb Q12hrs  -Per heme: transfuse when Hct <25  -Check H/H Q12hr  -Cont procardia for sPEC  -Monitor VS  -Agree with above plan/examination    sergey
Pt is s/p  section. She is doing well without complaints. Denies HA, lightheadedness, dizziness, CP, SOB, palpitations, abdominal pain, heavy vaginal bleeding.     T(C): 36.6 (24 @ 06:00), Max: 36.8 (24 @ 08:35)  HR: 85 (24 @ 06:00) (85 - 112)  BP: 115/68 (24 @ 06:00) (108/72 - 129/85)  RR: 18 (24 @ 06:00) (18 - 18)  SpO2: 98% (24 @ 06:00) (97% - 99%)    Physical exam:   Abd: NTND, fundus firm and well contracted, incision CDI  VE: Appropriate vaginal bleeding    Plan  -Cont Recomb vwb Q12hrs  -Per heme: transfuse when Hct <25  -Hematologist wants Iron transfusion this AM  -Check H/H Q12hr  -Cont procardia for sPEC  -Monitor VS  -Agree with above plan/examination    sergey
Pt is s/p  section. She is doing well without complaints. Denies HA, lightheadedness, dizziness, CP, SOB, palpitations, abdominal pain, heavy vaginal bleeding.     T(C): 36.7 (02-10-24 @ 05:30), Max: 36.7 (02-10-24 @ 05:30)  HR: 98 (02-10-24 @ 05:30) (76 - 100)  BP: 127/78 (02-10-24 @ 05:30) (110/71 - 134/83)  RR: 18 (02-10-24 @ 05:30) (18 - 18)  SpO2: 96% (02-10-24 @ 05:30) (95% - 100%)    Physical exam:   Abd: NTND, fundus firm and well contracted, incision CDI  VE: Appropriate vaginal bleeding  Gillespie: draining     Plan  -H/H decrease this AM but stable vitals - will repeat  -Continue to monitor H/H  -Cont Recombinant vWB  -If H/H continues to drop will give pRBC & discussed with hematology indication for DDAVP vs. increase frequency of recombinant vwb  -Monitor VS  -Agree with above plan/examination    sergey
Pt seen and examined this am. Discussed intraop findings and plan. Cont dc--will be discharged with this in place. needs KYARA Toure.
Agree with resident A&P  - clinically doing well with stable vitals and labs. Pending for this AM  - UOP well with downtrending Cr. Gillespie will remain for 1-2wks per uro recs  - s/p 2U PRBCs and iron infusion. H/H stable.   - Surgically clear for discharge. Will discuss with heme if ok to d/c after last dose of recombinant factors.
Agree with resident A&P as above  - pt clinically doing well  - vitals stable now on procardia 60. Will discontinue Mg gtt at 24h   - producing adequate urine and clearing from blood. Will f/u KYARA creatinine, but 50cc output overnight. Appreciate urology f/u- will likely has cysto for stent removal in 2 weeks   - vWB- will continue to receive factors q12h unless concern for bleeding, but H/H stable post 1U PRBCs

## 2024-02-13 NOTE — PROGRESS NOTE ADULT - ASSESSMENT
A/P: 37yo  h/o VwB Type 2A and urinary retention POD#5 s/p pC/S c/b cystotomy with bilateral ureteral stents and KYARA drain s/p 1u PRBC in OR and 2500u VonVendi and TXA. Postpartum she met criteria for pre-eclampsia with severe features requiring Pro 10 and Lab 20/40. On POD#1 blood pressure medication increased to 60XL. HELLP labs wnl although Cr now 1.17. Overnight BP well controlled.    #sPEC  - -140/80-90ss, ctm q4 hrs  - c/w Procardia 60XL  - Cr downtrendin.17->0.65  - f/u AM CMP  - s/p Mgx24 hrs postpartum    #Cystotomy s/p cystorraphy  - KUB: : There are bilateral nephroureteral stents in place. Left nephroureteral stent terminates in the right lower quadrant. A radio dense device overlies the urinary bladder. The bowel gas pattern is nonobstructive.  - UOP overnight adequate at 1300  - s/p KYARA drain  - Appreciate urology recs    #VwB  - s/p VonVendi 1250u q12 hrs x5 days. Push over 6min  - HSQ 5K TID for DVT ppx  - c/w pad counts  - Trend H/H and VonWillebrand activity    #Postpartum  - Reg diet  - Encourage ambulation  - Discharge planning    DUNG Willis, PGY3 A/P: 37yo  h/o VwB Type 2A and urinary retention POD#5 s/p pC/S c/b cystotomy with bilateral ureteral stents and KYARA drain s/p 1u PRBC in OR and 2500u VonVendi and TXA. Postpartum she met criteria for pre-eclampsia with severe features requiring Pro 10 and Lab 20/40. On POD#1 blood pressure medication increased to 60XL. Cr peaked at 1.17 and now normalized. Overnight BP well controlled.    #sPEC  - -140/80-90ss, ctm q4 hrs  - c/w Procardia 60XL  - Cr downtrendin.17->0.65  - f/u AM CMP  - s/p Mgx24 hrs postpartum    #Cystotomy s/p cystorraphy  - KUB: : There are bilateral nephroureteral stents in place. Left nephroureteral stent terminates in the right lower quadrant. A radio dense device overlies the urinary bladder. The bowel gas pattern is nonobstructive.  - UOP overnight adequate at 1300  - s/p KYARA drain  - Appreciate urology recs    #VwB  - s/p VonVendi 1250u q12 hrs x5 days. Push over 6min  - HSQ 5K TID for DVT ppx  - c/w pad counts  - Trend H/H and VonWillebrand activity    #Postpartum  - Reg diet  - Encourage ambulation  - Discharge planning    DUNG Willis, PGY3

## 2024-02-14 ENCOUNTER — TRANSCRIPTION ENCOUNTER (OUTPATIENT)
Age: 39
End: 2024-02-14

## 2024-02-14 VITALS
DIASTOLIC BLOOD PRESSURE: 77 MMHG | HEART RATE: 100 BPM | RESPIRATION RATE: 18 BRPM | SYSTOLIC BLOOD PRESSURE: 115 MMHG | TEMPERATURE: 98 F | OXYGEN SATURATION: 99 %

## 2024-02-14 LAB — VWF:RCO ACT/NOR PPP PL AGG: 238 % — HIGH (ref 45–133)

## 2024-02-14 PROCEDURE — 85025 COMPLETE CBC W/AUTO DIFF WBC: CPT

## 2024-02-14 PROCEDURE — C2617: CPT

## 2024-02-14 PROCEDURE — 83735 ASSAY OF MAGNESIUM: CPT

## 2024-02-14 PROCEDURE — P9016: CPT

## 2024-02-14 PROCEDURE — 80053 COMPREHEN METABOLIC PANEL: CPT

## 2024-02-14 PROCEDURE — 36430 TRANSFUSION BLD/BLD COMPNT: CPT

## 2024-02-14 PROCEDURE — C1769: CPT

## 2024-02-14 PROCEDURE — 86780 TREPONEMA PALLIDUM: CPT

## 2024-02-14 PROCEDURE — 85730 THROMBOPLASTIN TIME PARTIAL: CPT

## 2024-02-14 PROCEDURE — 36415 COLL VENOUS BLD VENIPUNCTURE: CPT

## 2024-02-14 PROCEDURE — 74018 RADEX ABDOMEN 1 VIEW: CPT

## 2024-02-14 PROCEDURE — C9399: CPT

## 2024-02-14 PROCEDURE — 86901 BLOOD TYPING SEROLOGIC RH(D): CPT

## 2024-02-14 PROCEDURE — C1889: CPT

## 2024-02-14 PROCEDURE — 85027 COMPLETE CBC AUTOMATED: CPT

## 2024-02-14 PROCEDURE — 59050 FETAL MONITOR W/REPORT: CPT

## 2024-02-14 PROCEDURE — 85245 CLOT FACTOR VIII VW RISTOCTN: CPT

## 2024-02-14 PROCEDURE — 86850 RBC ANTIBODY SCREEN: CPT

## 2024-02-14 PROCEDURE — C1758: CPT

## 2024-02-14 PROCEDURE — 88307 TISSUE EXAM BY PATHOLOGIST: CPT

## 2024-02-14 PROCEDURE — 85610 PROTHROMBIN TIME: CPT

## 2024-02-14 PROCEDURE — C1765: CPT

## 2024-02-14 PROCEDURE — 85384 FIBRINOGEN ACTIVITY: CPT

## 2024-02-14 PROCEDURE — 82962 GLUCOSE BLOOD TEST: CPT

## 2024-02-14 PROCEDURE — 82570 ASSAY OF URINE CREATININE: CPT

## 2024-02-14 PROCEDURE — 59025 FETAL NON-STRESS TEST: CPT

## 2024-02-14 PROCEDURE — 83615 LACTATE (LD) (LDH) ENZYME: CPT

## 2024-02-14 PROCEDURE — 86923 COMPATIBILITY TEST ELECTRIC: CPT

## 2024-02-14 PROCEDURE — 86900 BLOOD TYPING SEROLOGIC ABO: CPT

## 2024-02-14 PROCEDURE — 84550 ASSAY OF BLOOD/URIC ACID: CPT

## 2024-02-14 RX ORDER — LEVOTHYROXINE SODIUM 125 MCG
1 TABLET ORAL
Refills: 0 | DISCHARGE

## 2024-02-14 RX ORDER — DOCUSATE SODIUM 100 MG
1 CAPSULE ORAL
Refills: 0 | DISCHARGE

## 2024-02-14 RX ORDER — IBUPROFEN 200 MG
1 TABLET ORAL
Qty: 0 | Refills: 0 | DISCHARGE
Start: 2024-02-14

## 2024-02-14 RX ORDER — SENNA PLUS 8.6 MG/1
2 TABLET ORAL
Qty: 0 | Refills: 0 | DISCHARGE
Start: 2024-02-14

## 2024-02-14 RX ORDER — SIMETHICONE 80 MG/1
1 TABLET, CHEWABLE ORAL
Qty: 0 | Refills: 0 | DISCHARGE
Start: 2024-02-14

## 2024-02-14 RX ORDER — NIFEDIPINE 30 MG
1 TABLET, EXTENDED RELEASE 24 HR ORAL
Qty: 30 | Refills: 0
Start: 2024-02-14

## 2024-02-14 RX ORDER — ACETAMINOPHEN 500 MG
3 TABLET ORAL
Qty: 0 | Refills: 0 | DISCHARGE
Start: 2024-02-14

## 2024-02-14 RX ADMIN — Medication 325 MILLIGRAM(S): at 12:01

## 2024-02-14 RX ADMIN — Medication 1 TABLET(S): at 12:01

## 2024-02-14 RX ADMIN — Medication 650 MILLIGRAM(S): at 06:14

## 2024-02-14 RX ADMIN — Medication 650 MILLIGRAM(S): at 14:42

## 2024-02-14 RX ADMIN — Medication 650 MILLIGRAM(S): at 13:33

## 2024-02-14 RX ADMIN — Medication 650 MILLIGRAM(S): at 05:20

## 2024-02-14 RX ADMIN — Medication 1 MILLIGRAM(S): at 12:01

## 2024-02-14 NOTE — PROGRESS NOTE ADULT - SUBJECTIVE AND OBJECTIVE BOX
OB Postpartum Note:  Delivery, POD#6    S: 39yo h/o VwB POD#6 s/p LTCS c/b sPEC. The patient feels well.  Pain is well controlled. She is tolerating a regular diet and passing flatus. She is ambulating without difficulty. Denies CP/SOB. Denies lightheadedness, dizziness, or N/V.    O:  Vitals:  Vital Signs Last 24 Hrs  T(C): 36.8 (2024 22:00), Max: 36.8 (2024 17:00)  T(F): 98.2 (2024 22:00), Max: 98.2 (2024 17:00)  HR: 95 (:00) (90 - 109)  BP: 123/84 (2024 22:00) (109/75 - 126/82)  BP(mean): --  RR: 18 (2024 22:00) (18 - 18)  SpO2: 98% (:00) (98% - 100%)    Parameters below as of 2024 22:00  Patient On (Oxygen Delivery Method): room air        MEDICATIONS  (STANDING):  acetaminophen     Tablet .. 650 milliGRAM(s) Oral every 4 hours  acetaminophen     Tablet .. 975 milliGRAM(s) Oral every 6 hours  ferrous    sulfate 325 milliGRAM(s) Oral daily  folic acid 1 milliGRAM(s) Oral daily  heparin   Injectable 5000 Unit(s) SubCutaneous every 12 hours  ibuprofen  Tablet. 600 milliGRAM(s) Oral every 6 hours  lactated ringers. 1000 milliLiter(s) (125 mL/Hr) IV Continuous <Continuous>  magnesium sulfate Infusion 2 Gm/Hr (50 mL/Hr) IV Continuous <Continuous>  NIFEdipine XL 60 milliGRAM(s) Oral daily  oxytocin Infusion 333.333 milliUNIT(s)/Min (1000 mL/Hr) IV Continuous <Continuous>  prenatal multivitamin 1 Tablet(s) Oral daily  senna 2 Tablet(s) Oral at bedtime    MEDICATIONS  (PRN):  diphenhydrAMINE 25 milliGRAM(s) Oral every 6 hours PRN Pruritus  diphenhydrAMINE Injectable 25 milliGRAM(s) IV Push once PRN Allergy symptoms  lanolin Ointment 1 Application(s) Topical every 6 hours PRN Sore Nipples  magnesium hydroxide Suspension 30 milliLiter(s) Oral two times a day PRN Constipation  oxyCODONE    IR 5 milliGRAM(s) Oral every 6 hours PRN Severe Pain (7 - 10)  polyethylene glycol 3350 17 Gram(s) Oral daily PRN Constipation  simethicone 80 milliGRAM(s) Chew every 4 hours PRN Gas      LABS:  Blood type: AB Positive  Rubella IgG: RPR: Negative                          9.6<L>   8.69 >-----------< 306    (  @ 11:00 )             30.1<L>                        8.6<L>   9.41 >-----------< 218    (  @ 07:04 )             27.5<L>                        8.4<L>   11.04<H> >-----------< 183    (  @ 07:30 )             26.1<L>    24 @ 11:00      138  |  103  |  10  ----------------------------<  139<H>  3.7   |  24  |  0.59    24 @ 07:06      140  |  106  |  9   ----------------------------<  78  3.3<L>   |  28  |  0.65        Ca    9.3      2024 11:00  Ca    8.7      2024 07:06    TPro  6.2  /  Alb  3.0<L>  /  TBili  0.3  /  DBili  x   /  AST  20  /  ALT  33  /  AlkPhos  101  24 @ 11:00  TPro  5.6<L>  /  Alb  2.7<L>  /  TBili  0.3  /  DBili  x   /  AST  49<H>  /  ALT  44  /  AlkPhos  97  24 @ 07:06          Physical exam:  Gen: NAD  CV: RRR  Pulm: CTAB  Abdomen: Soft, nontender, no distension, firm uterine fundus at umbilicus.  Incision: Clean, dry, and intact   Pelvic: Normal lochia noted  Ext: No calf tenderness or edema

## 2024-02-14 NOTE — DISCHARGE NOTE OB - CARE PROVIDER_API CALL
James Martinez  Obstetrics and Gynecology  [address] [place] [address]  Phone: [phone]  Fax: [phone]  Follow Up Time: 2 weeks

## 2024-02-14 NOTE — DISCHARGE NOTE OB - PATIENT PORTAL LINK FT
You can access the FollowMyHealth Patient Portal offered by [facility] by registering at the following website: [email or web address]. By joining [facility]’s FollowMyHealth portal, you will also be able to view your health information using other applications (apps) compatible with our system.

## 2024-02-14 NOTE — DISCHARGE NOTE OB - NS MD DC FALL RISK RISK
For information on Fall & Injury Prevention, visit: [email or web address] OR  [email or web address] OR  https://www.cdc.gov/steadi/patient.html

## 2024-02-14 NOTE — PROGRESS NOTE ADULT - ASSESSMENT
A/P: 39yo  h/o VwB Type 2A and urinary retention POD#6 s/p pC/S c/b cystotomy with bilateral ureteral stents and KYARA drain s/p 1u PRBC in OR and 2500u VonVendi and TXA. Postpartum she met criteria for pre-eclampsia with severe features requiring Pro 10 and Lab 20/40. On POD#1 blood pressure medication increased to 60XL. Cr peaked at 1.17 and now normalized. Overnight BP well controlled.    #sPEC  - /80s, ctm q4 hrs  - c/w Procardia 60XL  - Cr downtrendin.17->0.65->0.59  - s/p Mgx24 hrs postpartum  - No signs or symptoms of PEC at this time    #Cystotomy s/p cystorraphy  - KUB: : There are bilateral nephroureteral stents in place. Left nephroureteral stent terminates in the right lower quadrant. A radio dense device overlies the urinary bladder. The bowel gas pattern is nonobstructive.  - UOP overnight adequate at 1300  - s/p KYARA drain  - Appreciate urology recs    #VwB  - s/p VonVendi 1250u q12 hrs x5 days.  - HSQ 5K TID for DVT ppx  - c/w pad counts  - Trend H/H and VonWillebrand activity    #Postpartum  - Reg diet  - Encourage ambulation  - Discharge planning    DUNG Willis, PGY3   A/P: 39yo  h/o VwB Type 2A and urinary retention POD#6 s/p pC/S c/b cystotomy with bilateral ureteral stents and KYARA drain s/p 1u PRBC in OR and 2500u VonVendi and TXA. Postpartum she met criteria for pre-eclampsia with severe features requiring Pro 10 and Lab 20/40. On POD#1 blood pressure medication increased to 60XL. Cr peaked at 1.17 and now normalized. Overnight BP well controlled.    #sPEC  - /80s, ctm q4 hrs  - c/w Procardia 60XL  - Cr downtrendin.17->0.65->0.59  - s/p Mgx24 hrs postpartum  - No signs or symptoms of PEC at this time    #Cystotomy s/p cystorraphy  - KUB: : There are bilateral nephroureteral stents in place. Left nephroureteral stent terminates in the right lower quadrant. A radio dense device overlies the urinary bladder. The bowel gas pattern is nonobstructive.  - UOP overnight adequate at 1300  - s/p KYARA drain  - Appreciate urology recs    #VwB  - Normal lochia  - s/p VonVendi 1250u q12 hrs x5 days.  - s/p IV Venofer 12/13  - HSQ 5K TID for DVT ppx  - c/w pad counts  - Trend H/H and VonWillebrand activity    #Postpartum  - Reg diet  - Encourage ambulation  - Discharge planning    DUNG Willis, PGY3

## 2024-02-14 NOTE — DISCHARGE NOTE OB - HOSPITAL COURSE
delivery complicated by bladder injury and repair. Also with PP PEC controlled on Procardia. vWB 2a without postop bleeding- received 5d recombinant factors

## 2024-02-15 ENCOUNTER — OUTPATIENT (OUTPATIENT)
Dept: OUTPATIENT SERVICES | Facility: HOSPITAL | Age: 39
LOS: 1 days | End: 2024-02-15
Payer: SELF-PAY

## 2024-02-15 ENCOUNTER — APPOINTMENT (OUTPATIENT)
Dept: CT IMAGING | Facility: IMAGING CENTER | Age: 39
End: 2024-02-15
Payer: MEDICAID

## 2024-02-15 DIAGNOSIS — Z90.49 ACQUIRED ABSENCE OF OTHER SPECIFIED PARTS OF DIGESTIVE TRACT: Chronic | ICD-10-CM

## 2024-02-15 DIAGNOSIS — Z98.890 OTHER SPECIFIED POSTPROCEDURAL STATES: Chronic | ICD-10-CM

## 2024-02-15 DIAGNOSIS — Z90.79 ACQUIRED ABSENCE OF OTHER GENITAL ORGAN(S): Chronic | ICD-10-CM

## 2024-02-15 DIAGNOSIS — S37.20XA UNSPECIFIED INJURY OF BLADDER, INITIAL ENCOUNTER: ICD-10-CM

## 2024-02-15 PROCEDURE — 72192 CT PELVIS W/O DYE: CPT

## 2024-02-15 PROCEDURE — 72192 CT PELVIS W/O DYE: CPT | Mod: 26

## 2024-02-16 ENCOUNTER — NON-APPOINTMENT (OUTPATIENT)
Age: 39
End: 2024-02-16

## 2024-02-16 ENCOUNTER — APPOINTMENT (OUTPATIENT)
Dept: UROLOGY | Facility: CLINIC | Age: 39
End: 2024-02-16
Payer: MEDICAID

## 2024-02-16 VITALS
RESPIRATION RATE: 16 BRPM | TEMPERATURE: 97.8 F | DIASTOLIC BLOOD PRESSURE: 72 MMHG | SYSTOLIC BLOOD PRESSURE: 132 MMHG | HEART RATE: 88 BPM

## 2024-02-16 PROCEDURE — 99024 POSTOP FOLLOW-UP VISIT: CPT

## 2024-02-16 RX ORDER — CEFPODOXIME PROXETIL 100 MG
1 TABLET ORAL
Qty: 4 | Refills: 0
Start: 2024-02-16 | End: 2024-02-17

## 2024-02-20 ENCOUNTER — APPOINTMENT (OUTPATIENT)
Dept: HEMATOLOGY ONCOLOGY | Facility: CLINIC | Age: 39
End: 2024-02-20

## 2024-02-22 ENCOUNTER — APPOINTMENT (OUTPATIENT)
Dept: CARDIOLOGY | Facility: CLINIC | Age: 39
End: 2024-02-22
Payer: MEDICAID

## 2024-02-22 ENCOUNTER — NON-APPOINTMENT (OUTPATIENT)
Age: 39
End: 2024-02-22

## 2024-02-22 VITALS
BODY MASS INDEX: 23.73 KG/M2 | WEIGHT: 139 LBS | HEART RATE: 100 BPM | OXYGEN SATURATION: 97 % | SYSTOLIC BLOOD PRESSURE: 102 MMHG | DIASTOLIC BLOOD PRESSURE: 68 MMHG | HEIGHT: 64 IN

## 2024-02-22 VITALS — DIASTOLIC BLOOD PRESSURE: 70 MMHG | SYSTOLIC BLOOD PRESSURE: 100 MMHG

## 2024-02-22 DIAGNOSIS — Z00.00 ENCOUNTER FOR GENERAL ADULT MEDICAL EXAMINATION W/OUT ABNORMAL FINDINGS: ICD-10-CM

## 2024-02-22 PROCEDURE — G2211 COMPLEX E/M VISIT ADD ON: CPT | Mod: NC,1L

## 2024-02-22 PROCEDURE — 93000 ELECTROCARDIOGRAM COMPLETE: CPT

## 2024-02-22 PROCEDURE — 99204 OFFICE O/P NEW MOD 45 MIN: CPT | Mod: 25

## 2024-02-22 RX ORDER — NIFEDIPINE 60 MG/1
60 TABLET, FILM COATED, EXTENDED RELEASE ORAL DAILY
Refills: 0 | Status: ACTIVE | COMMUNITY

## 2024-02-22 RX ORDER — DESMOPRESSIN ACETATE 0.1 MG/ML
0.01 SPRAY NASAL DAILY
Qty: 5 | Refills: 3 | Status: DISCONTINUED | COMMUNITY
Start: 2022-02-28 | End: 2024-02-22

## 2024-02-22 RX ORDER — CEPHALEXIN 250 MG/1
250 TABLET ORAL TWICE DAILY
Qty: 10 | Refills: 0 | Status: DISCONTINUED | COMMUNITY
Start: 2024-01-08 | End: 2024-02-22

## 2024-02-22 RX ORDER — TRANEXAMIC ACID 650 MG/1
650 TABLET ORAL EVERY 8 HOURS
Qty: 42 | Refills: 1 | Status: DISCONTINUED | COMMUNITY
Start: 2021-04-20 | End: 2024-02-22

## 2024-02-22 NOTE — HISTORY OF PRESENT ILLNESS
[FreeTextEntry1] : 38F h/o Von Willebrand disease,  recent postpartum 24 via  with bladder injury with urinary retention required repair and cystotomy with bilateral ureteral stents and postpartum preeclampsia discharged on nifedipine 60mg, presents for cardiology evaluation.    Patient presents with her  for the visit. Feeling well, no cardiac complains, Home -110/70s had skipped nifedipine for 1 day still with SBP <120, noted migraine headaches when taking the nifedipine, had lost >35 lbs last 1-2 weeks with resolving LE edema. Reports was on ASA during the pregnancy conceived through IVF, no plan for further future pregnancy.   Parents with HTN age 50s Mother with MV regurgitation required MVR, CAD/CABG x3 age 40s Two sisters with no heart disease or preeclampsia

## 2024-02-22 NOTE — DISCUSSION/SUMMARY
[EKG obtained to assist in diagnosis and management of assessed problem(s)] : EKG obtained to assist in diagnosis and management of assessed problem(s) [FreeTextEntry1] : 38F h/o Von Willebrand disease,  recent postpartum 24 via  with bladder injury with urinary retention required repair and cystotomy with bilateral ureteral stents and postpartum preeclampsia discharged on nifedipine 60mg, presents for cardiology evaluation.   BP on lower range will reduce nifedipine to 30mg and if still with BP <130/80s then can discontinue use, anticipate resolution of preeclampsia within the next 2 weeks, continue to check home BP for 1 more month when off med. LE edema resolving and EKG normal, no further cardiac testing indicated for now except would check lipid panel and Lp (a) level give maternal history of premature CAD at age 40s. At risk for future early onset HTN and cardiovascular risk, need optimal risk factors control and close PCP follow up.  -pending follow up with GYN and urology for elective ureteral stent removal.   Follow up as needed if new cardiac issue arise.

## 2024-02-22 NOTE — PHYSICAL EXAM
[Well Developed] : well developed [Well Nourished] : well nourished [No Acute Distress] : no acute distress [Normal Conjunctiva] : normal conjunctiva [Normal Venous Pressure] : normal venous pressure [No Carotid Bruit] : no carotid bruit [Normal S1, S2] : normal S1, S2 [No Murmur] : no murmur [No Rub] : no rub [No Gallop] : no gallop [Clear Lung Fields] : clear lung fields [Good Air Entry] : good air entry [Soft] : abdomen soft [No Respiratory Distress] : no respiratory distress  [No Masses/organomegaly] : no masses/organomegaly [Non Tender] : non-tender [Normal Bowel Sounds] : normal bowel sounds [Normal Gait] : normal gait [No Edema] : no edema [No Cyanosis] : no cyanosis [No Clubbing] : no clubbing [No Varicosities] : no varicosities [No Rash] : no rash [No Skin Lesions] : no skin lesions [Moves all extremities] : moves all extremities [No Focal Deficits] : no focal deficits [Alert and Oriented] : alert and oriented [Normal Speech] : normal speech [Normal memory] : normal memory

## 2024-02-26 ENCOUNTER — APPOINTMENT (OUTPATIENT)
Dept: UROLOGY | Facility: CLINIC | Age: 39
End: 2024-02-26

## 2024-02-26 ENCOUNTER — APPOINTMENT (OUTPATIENT)
Dept: UROLOGY | Facility: CLINIC | Age: 39
End: 2024-02-26
Payer: MEDICAID

## 2024-02-26 ENCOUNTER — LABORATORY RESULT (OUTPATIENT)
Age: 39
End: 2024-02-26

## 2024-02-26 VITALS — HEART RATE: 102 BPM | DIASTOLIC BLOOD PRESSURE: 83 MMHG | SYSTOLIC BLOOD PRESSURE: 117 MMHG

## 2024-02-26 PROCEDURE — 99024 POSTOP FOLLOW-UP VISIT: CPT

## 2024-02-26 PROCEDURE — 81003 URINALYSIS AUTO W/O SCOPE: CPT | Mod: QW

## 2024-02-26 NOTE — HISTORY OF PRESENT ILLNESS
[FreeTextEntry1] : 37yo female with cc of bladder injury during .   Pt with hx of VonWillebrands and underwent elective  under general for first baby. Pregnancy significant for urinary retention requiring intermittent catheterization. pt with hx of perf appy age 14. At time of , complex bladder injury noted. B ureters did not appear involved. Repair performed and B ureteral stents left in place. Dc removed last week after CT cystogram was negative for leak. Pericath abx were given.   Pt returns today for stent removal but reports concern for infection. She has had increased frequency since dc removal. Notes some increased dysuria and mucus production in urine and an "itchy" feeling. No fever. UDip today with 3+ LE and 3+ blood.

## 2024-02-26 NOTE — PHYSICAL EXAM
[General Appearance - Well Developed] : well developed [General Appearance - Well Nourished] : well nourished [General Appearance - In No Acute Distress] : no acute distress [Abdomen Tenderness] : non-tender [Exaggerated Use Of Accessory Muscles For Inspiration] : no accessory muscle use [Urethral Meatus] : normal urethra [External Female Genitalia] : normal external genitalia [Oriented To Time, Place, And Person] : oriented to person, place, and time

## 2024-02-27 LAB
APPEARANCE: ABNORMAL
BACTERIA: ABNORMAL /HPF
BILIRUB UR QL STRIP: NORMAL
BILIRUBIN URINE: NEGATIVE
BLOOD URINE: ABNORMAL
CAST: NORMAL /LPF
CLARITY UR: NORMAL
COLLECTION METHOD: NORMAL
COLOR: YELLOW
EPITHELIAL CELLS: 2 /HPF
FERRITIN SERPL-MCNC: 182 NG/ML
GLUCOSE QUALITATIVE U: NEGATIVE MG/DL
GLUCOSE UR-MCNC: NORMAL
HCG UR QL: 0.2 EU/DL
HGB UR QL STRIP.AUTO: NORMAL
HYALINE CASTS: NORMAL
KETONES UR-MCNC: NORMAL
KETONES URINE: NEGATIVE MG/DL
LEUKOCYTE ESTERASE UR QL STRIP: NORMAL
LEUKOCYTE ESTERASE URINE: ABNORMAL
MICROSCOPIC-UA: NORMAL
NITRITE UR QL STRIP: NORMAL
NITRITE URINE: NEGATIVE
PH UR STRIP: 6.5
PH URINE: 7
PROT UR STRIP-MCNC: NORMAL
PROTEIN URINE: 30 MG/DL
RED BLOOD CELLS URINE: 13 /HPF
REVIEW: NORMAL
SP GR UR STRIP: 1.01
SPECIFIC GRAVITY URINE: 1.01
UROBILINOGEN URINE: 0.2 MG/DL
WBC CLUMPS: PRESENT
WHITE BLOOD CELLS URINE: >998 /HPF

## 2024-02-29 LAB — BACTERIA UR CULT: ABNORMAL

## 2024-03-04 ENCOUNTER — OUTPATIENT (OUTPATIENT)
Dept: OUTPATIENT SERVICES | Facility: HOSPITAL | Age: 39
LOS: 1 days | End: 2024-03-04
Payer: MEDICAID

## 2024-03-04 ENCOUNTER — APPOINTMENT (OUTPATIENT)
Dept: INFUSION THERAPY | Facility: CLINIC | Age: 39
End: 2024-03-04

## 2024-03-04 VITALS — SYSTOLIC BLOOD PRESSURE: 122 MMHG | DIASTOLIC BLOOD PRESSURE: 73 MMHG | TEMPERATURE: 98 F | HEART RATE: 80 BPM

## 2024-03-04 DIAGNOSIS — D68.00 VON WILLEBRAND DISEASE, UNSPECIFIED: ICD-10-CM

## 2024-03-04 DIAGNOSIS — Z98.890 OTHER SPECIFIED POSTPROCEDURAL STATES: Chronic | ICD-10-CM

## 2024-03-04 DIAGNOSIS — Z90.49 ACQUIRED ABSENCE OF OTHER SPECIFIED PARTS OF DIGESTIVE TRACT: Chronic | ICD-10-CM

## 2024-03-04 DIAGNOSIS — Z90.79 ACQUIRED ABSENCE OF OTHER GENITAL ORGAN(S): Chronic | ICD-10-CM

## 2024-03-04 LAB
BASOPHILS # BLD AUTO: 0.04 K/UL
BASOPHILS NFR BLD AUTO: 0.5 %
EOSINOPHIL # BLD AUTO: 0.28 K/UL
EOSINOPHIL NFR BLD AUTO: 3.7 %
HCT VFR BLD CALC: 27.6 %
HGB BLD-MCNC: 8.9 G/DL
IMM GRANULOCYTES NFR BLD AUTO: 1.3 %
LYMPHOCYTES # BLD AUTO: 2 K/UL
LYMPHOCYTES NFR BLD AUTO: 26.1 %
MAN DIFF?: NORMAL
MCHC RBC-ENTMCNC: 28.9 PG
MCHC RBC-ENTMCNC: 32.2 G/DL
MCV RBC AUTO: 89.6 FL
MONOCYTES # BLD AUTO: 0.44 K/UL
MONOCYTES NFR BLD AUTO: 5.7 %
NEUTROPHILS # BLD AUTO: 4.8 K/UL
NEUTROPHILS NFR BLD AUTO: 62.7 %
PLATELET # BLD AUTO: 282 K/UL
PMV BLD AUTO: 0 /100 WBCS
RBC # BLD: 3.08 M/UL
RBC # FLD: 14.6 %
WBC # FLD AUTO: 7.66 K/UL

## 2024-03-04 PROCEDURE — 85027 COMPLETE CBC AUTOMATED: CPT

## 2024-03-04 PROCEDURE — 96365 THER/PROPH/DIAG IV INF INIT: CPT

## 2024-03-04 RX ORDER — IRON SUCROSE 20 MG/ML
200 INJECTION, SOLUTION INTRAVENOUS ONCE
Refills: 0 | Status: COMPLETED | OUTPATIENT
Start: 2024-03-04 | End: 2024-03-04

## 2024-03-04 RX ADMIN — IRON SUCROSE 220 MILLIGRAM(S): 20 INJECTION, SOLUTION INTRAVENOUS at 16:27

## 2024-03-04 RX ADMIN — IRON SUCROSE 200 MILLIGRAM(S): 20 INJECTION, SOLUTION INTRAVENOUS at 16:57

## 2024-03-05 DIAGNOSIS — D68.00 VON WILLEBRAND DISEASE, UNSPECIFIED: ICD-10-CM

## 2024-03-06 ENCOUNTER — APPOINTMENT (OUTPATIENT)
Dept: UROLOGY | Facility: CLINIC | Age: 39
End: 2024-03-06
Payer: MEDICAID

## 2024-03-06 ENCOUNTER — OUTPATIENT (OUTPATIENT)
Dept: OUTPATIENT SERVICES | Facility: HOSPITAL | Age: 39
LOS: 1 days | End: 2024-03-06
Payer: MEDICAID

## 2024-03-06 DIAGNOSIS — Z90.79 ACQUIRED ABSENCE OF OTHER GENITAL ORGAN(S): Chronic | ICD-10-CM

## 2024-03-06 DIAGNOSIS — S37.20XA UNSPECIFIED INJURY OF BLADDER, INITIAL ENCOUNTER: ICD-10-CM

## 2024-03-06 DIAGNOSIS — S31.000A UNSPECIFIED INJURY OF BLADDER, INITIAL ENCOUNTER: ICD-10-CM

## 2024-03-06 DIAGNOSIS — R35.0 FREQUENCY OF MICTURITION: ICD-10-CM

## 2024-03-06 PROCEDURE — 52315 CYSTOSCOPY AND TREATMENT: CPT

## 2024-03-06 PROCEDURE — 52315 CYSTOSCOPY AND TREATMENT: CPT | Mod: 58

## 2024-03-11 PROBLEM — S37.20XA BLADDER INJURY, OPEN: Status: ACTIVE | Noted: 2024-02-12

## 2024-03-12 ENCOUNTER — LABORATORY RESULT (OUTPATIENT)
Age: 39
End: 2024-03-12

## 2024-03-12 DIAGNOSIS — S37.20XA UNSPECIFIED INJURY OF BLADDER, INITIAL ENCOUNTER: ICD-10-CM

## 2024-03-19 ENCOUNTER — LABORATORY RESULT (OUTPATIENT)
Age: 39
End: 2024-03-19

## 2024-03-20 LAB — FERRITIN SERPL-MCNC: 165 NG/ML

## 2024-03-20 NOTE — OB PROVIDER DELIVERY SUMMARY - NS_BIRTHTRAUMAA_OBGYN_ALL_OB
PATIENTS ONCOLOGY RECORD LOCATED IN [facility]      Subjective     Name:  LAUREN GHOSH DARIEN     Date:  2018  Address:  [address] [place] [address]  Home: [phone]  :  1940 AGE:  77 y.o.        RECORDS OBTAINED:  Patients Oncology Record is located in [facility]   None

## 2024-03-20 NOTE — OB PROVIDER DELIVERY SUMMARY - NSPROVIDERDELIVERYNOTE_OBGYN_ALL_OB_FT
Scheduled primary c/s for Hx vWB Type 2a. Pt elected for c/s as pt could not receive epidural. Significant adhesions from bladder to anterior abdominal wall and uterus. Upon separation of muscle noted dc balloon in abdominal cavity. Hysterotomy made through bladder 2/2 significant adhesions. Hysterotomy repaired incorporated into bladder for hemostasis. Urology was called. Bladder adhesions taken down and bladder  from uterus. Hysterotomy was re-closed using PDS suture. Bladder repair performed by urology. UOs intact with good efflux. Closed abdomen in standard fashion. Pt received 1uPRBC intraop. Was receiving recombinant vwb factor prior to surgery.

## 2024-03-20 NOTE — OB PROVIDER DELIVERY SUMMARY - NSSELHIDDEN_OBGYN_ALL_OB_FT
[NS_DeliveryAttending1_OBGYN_ALL_OB_FT:[id number]==],[NS_DeliveryAssist1_OBGYN_ALL_OB_FT:[id number]],[NS_DeliveryAttending2_OBGYN_ALL_OB_FT:[id number]],[NS_DeliveryRN_OBGYN_ALL_OB_FT:[id number]]

## 2024-03-22 ENCOUNTER — APPOINTMENT (OUTPATIENT)
Dept: UROLOGY | Facility: CLINIC | Age: 39
End: 2024-03-22

## 2024-03-22 RX ORDER — AMOXICILLIN AND CLAVULANATE POTASSIUM 500; 125 MG/1; MG/1
500-125 TABLET, FILM COATED ORAL
Qty: 14 | Refills: 0 | Status: ACTIVE | COMMUNITY
Start: 2024-02-28 | End: 1900-01-01

## 2024-03-23 ENCOUNTER — LABORATORY RESULT (OUTPATIENT)
Age: 39
End: 2024-03-23

## 2024-03-24 ENCOUNTER — NON-APPOINTMENT (OUTPATIENT)
Age: 39
End: 2024-03-24

## 2024-03-25 LAB
APPEARANCE: ABNORMAL
BILIRUBIN URINE: NEGATIVE
BLOOD URINE: ABNORMAL
COLOR: YELLOW
GLUCOSE QUALITATIVE U: NEGATIVE MG/DL
KETONES URINE: NEGATIVE MG/DL
LEUKOCYTE ESTERASE URINE: ABNORMAL
NITRITE URINE: POSITIVE
PH URINE: 6.5
PROTEIN URINE: 300 MG/DL
SPECIFIC GRAVITY URINE: 1.01
UROBILINOGEN URINE: 0.2 MG/DL

## 2024-03-27 LAB — BACTERIA UR CULT: ABNORMAL

## 2024-03-30 ENCOUNTER — LABORATORY RESULT (OUTPATIENT)
Age: 39
End: 2024-03-30

## 2024-04-01 ENCOUNTER — APPOINTMENT (OUTPATIENT)
Dept: UROLOGY | Facility: CLINIC | Age: 39
End: 2024-04-01
Payer: MEDICAID

## 2024-04-01 ENCOUNTER — OUTPATIENT (OUTPATIENT)
Dept: OUTPATIENT SERVICES | Facility: HOSPITAL | Age: 39
LOS: 1 days | End: 2024-04-01
Payer: MEDICAID

## 2024-04-01 ENCOUNTER — LABORATORY RESULT (OUTPATIENT)
Age: 39
End: 2024-04-01

## 2024-04-01 VITALS
OXYGEN SATURATION: 99 % | HEART RATE: 78 BPM | SYSTOLIC BLOOD PRESSURE: 123 MMHG | DIASTOLIC BLOOD PRESSURE: 86 MMHG | TEMPERATURE: 97.5 F | RESPIRATION RATE: 16 BRPM

## 2024-04-01 DIAGNOSIS — Z98.890 OTHER SPECIFIED POSTPROCEDURAL STATES: Chronic | ICD-10-CM

## 2024-04-01 DIAGNOSIS — Z90.49 ACQUIRED ABSENCE OF OTHER SPECIFIED PARTS OF DIGESTIVE TRACT: Chronic | ICD-10-CM

## 2024-04-01 DIAGNOSIS — Z90.79 ACQUIRED ABSENCE OF OTHER GENITAL ORGAN(S): Chronic | ICD-10-CM

## 2024-04-01 PROCEDURE — 51701 INSERT BLADDER CATHETER: CPT

## 2024-04-01 PROCEDURE — 51701 INSERT BLADDER CATHETER: CPT | Mod: 58

## 2024-04-01 PROCEDURE — 99214 OFFICE O/P EST MOD 30 MIN: CPT | Mod: 24

## 2024-04-01 RX ORDER — TAMSULOSIN HYDROCHLORIDE 0.4 MG/1
0.4 CAPSULE ORAL
Qty: 60 | Refills: 5 | Status: ACTIVE | COMMUNITY
Start: 2024-04-01 | End: 1900-01-01

## 2024-04-02 ENCOUNTER — APPOINTMENT (OUTPATIENT)
Age: 39
End: 2024-04-02

## 2024-04-02 ENCOUNTER — NON-APPOINTMENT (OUTPATIENT)
Age: 39
End: 2024-04-02

## 2024-04-02 LAB
APPEARANCE: CLEAR
BILIRUBIN URINE: NEGATIVE
BLOOD URINE: NEGATIVE
COLOR: YELLOW
GLUCOSE QUALITATIVE U: NEGATIVE MG/DL
KETONES URINE: NEGATIVE MG/DL
LEUKOCYTE ESTERASE URINE: ABNORMAL
NITRITE URINE: NEGATIVE
PH URINE: 6
PROTEIN URINE: NEGATIVE MG/DL
SPECIFIC GRAVITY URINE: 1.01
UROBILINOGEN URINE: 0.2 MG/DL

## 2024-04-04 LAB
APPEARANCE: CLEAR
BACTERIA: NEGATIVE /HPF
BILIRUBIN URINE: NEGATIVE
BLOOD URINE: ABNORMAL
CAST: 0 /LPF
COLOR: YELLOW
EPITHELIAL CELLS: 0 /HPF
GLUCOSE QUALITATIVE U: NEGATIVE MG/DL
KETONES URINE: NEGATIVE MG/DL
LEUKOCYTE ESTERASE URINE: ABNORMAL
MICROSCOPIC-UA: NORMAL
NITRITE URINE: NEGATIVE
PH URINE: 6.5
PROTEIN URINE: NORMAL MG/DL
RED BLOOD CELLS URINE: 0 /HPF
REVIEW: NORMAL
SPECIFIC GRAVITY URINE: <1.005
UROBILINOGEN URINE: 0.2 MG/DL
WHITE BLOOD CELLS URINE: 318 /HPF

## 2024-04-04 NOTE — ASSESSMENT
[FreeTextEntry1] : recurrent UTI with different organisms after complex bladder repair during . Pt found to have incomplete bladder emptying with [id number]. Not uncomfortable. Suspect long standing voiding dysfunction contributing to current incomplete emptying as well as urinary retention that occurred during pregnancy.  --Begin daily CIC. record volumes --UA, UCx --Begin flomax 0.8mg --F/u with Dr Pitts

## 2024-04-04 NOTE — HISTORY OF PRESENT ILLNESS
[FreeTextEntry1] : 39yo female with cc of bladder injury during  and UTI  Pt with hx of VonWillebrands and underwent elective  under general for first baby. Pregnancy significant for urinary retention requiring intermittent catheterization. pt with hx of perf appy age 14. At time of , complex bladder injury noted. B ureters did not appear involved. Repair performed and B ureteral stents left in place. Gillespie removed last week after CT cystogram was negative for leak. Pericath abx were given.   Pt had stent removal delayed due to UTI sx. UCx showed proteus and enterococcus tx with augmentin. She had sx shortly after finishing and then felt sx again and gave herself augmentin she had on her own. She again with sx recently. Empirically tx with augmentin. Ucx with EColi (R-amp, bactrim). Sx include fever, chills, increaesd frequency, dysuria. Just finished abx today and feels well but nervous about this.   Bowels normal. Drinking well. No urinary bother otherwise. No issues with UTIs prior to this.   PVR today 350cc.

## 2024-04-04 NOTE — PHYSICAL EXAM
[General Appearance - Well Developed] : well developed [General Appearance - Well Nourished] : well nourished [General Appearance - In No Acute Distress] : no acute distress [Exaggerated Use Of Accessory Muscles For Inspiration] : no accessory muscle use [Abdomen Tenderness] : non-tender [Urethral Meatus] : normal urethra [External Female Genitalia] : normal external genitalia [Oriented To Time, Place, And Person] : oriented to person, place, and time

## 2024-04-05 DIAGNOSIS — N39.0 URINARY TRACT INFECTION, SITE NOT SPECIFIED: ICD-10-CM

## 2024-04-05 DIAGNOSIS — R33.9 RETENTION OF URINE, UNSPECIFIED: ICD-10-CM

## 2024-04-05 RX ORDER — CEFDINIR 300 MG/1
300 CAPSULE ORAL TWICE DAILY
Qty: 14 | Refills: 0 | Status: ACTIVE | COMMUNITY
Start: 2024-04-05 | End: 1900-01-01

## 2024-04-05 RX ORDER — CEFPODOXIME PROXETIL 200 MG/1
200 TABLET, FILM COATED ORAL
Qty: 14 | Refills: 0 | Status: ACTIVE | COMMUNITY
Start: 2024-04-05 | End: 1900-01-01

## 2024-04-07 LAB — BACTERIA UR CULT: ABNORMAL

## 2024-04-17 LAB — SURGICAL PATHOLOGY STUDY: SIGNIFICANT CHANGE UP

## 2024-04-19 ENCOUNTER — APPOINTMENT (OUTPATIENT)
Dept: UROLOGY | Facility: CLINIC | Age: 39
End: 2024-04-19
Payer: MEDICAID

## 2024-04-19 DIAGNOSIS — N39.0 URINARY TRACT INFECTION, SITE NOT SPECIFIED: ICD-10-CM

## 2024-04-19 PROCEDURE — G2211 COMPLEX E/M VISIT ADD ON: CPT | Mod: NC,1L

## 2024-04-19 PROCEDURE — 99213 OFFICE O/P EST LOW 20 MIN: CPT | Mod: 24

## 2024-04-22 LAB
APPEARANCE: CLEAR
BILIRUBIN URINE: NEGATIVE
BLOOD URINE: NEGATIVE
COLOR: YELLOW
GLUCOSE QUALITATIVE U: NEGATIVE MG/DL
KETONES URINE: NEGATIVE MG/DL
LEUKOCYTE ESTERASE URINE: NEGATIVE
NITRITE URINE: NEGATIVE
PH URINE: 6.5
PROTEIN URINE: NEGATIVE MG/DL
SPECIFIC GRAVITY URINE: 1.01
UROBILINOGEN URINE: 0.2 MG/DL

## 2024-05-10 NOTE — H&P PST ADULT - FALL HARM RISK - PATIENT NEEDS ASSISTANCE
5/10/2024       RE: Manuela Osman  [address]  [facility] [address]     Dear Colleague,    Thank you for referring your patient, Manuela Osman, to the [facility]. Please see a copy of my visit note below.    [facility]  General Infectious Disease/HIV Clinic Note: New Patient     Patient:  Manuela Osman, Date of birth 1972   Medical record number [id number]  Date of Visit:  05/10/2024   Consult requested by Dr. Franki Kwon for evaluation of brain mass, h/o TB meningitis.     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 DMII, last HbA1c 8.7%  4. Peripheral neuropathy (2/2 isoniazid toxicity?)    Ms. Osman presents to ID clinic to discuss a new brain mass in the context of previously treated TB meningitis. After her initial diagnosis in 2020, she received an appropriate 12 month course of anti-TB therapy that was administered by the health department via DOT. I reviewed her imaging with our Neuroradiologists, and all of the prior nodular areas on her 2020 MRI have resolved, and this is a new lesion. Per discussion with them as well, the lesion appears to be more consistent with a neoplastic lesion rather than an infectious one. Normal inflammatory markers also argue against active infection, though this is not definite. She does have a persistent headache and subjective fevers (never measured) that may suggest ongoing infection/inflammation.     Unfortunately, imaging is not sufficient to diagnose or exclude an infection. She received appropriate anti-TB therapy for TB meningitis which is reassuring. However, one possibility is that the lesion is a tuberculoma. Tuberculomas can paradoxically  develop during appropriate anti-TB therapy. This is a rare occurrence and very difficult to diagnose without culture and histopathologic examination of the tissue involved. We can start with an LP to assess for TB (high protein may also indicate a tuberculoma), and I will also discuss next steps with NSGY team as far as sampling and/or resection of the lesion. Given her severe peripheral neuropathy, I do not think an empiric trial of anti-TB therapy would be appropriate.      Recommendations     Recommend LP with cell count, differential, glucose, protein, MTB PCR, AFB smear and culture  I will reach out to NSGY team to discuss next steps    RTC: 1 month or PRN    I spent a total of 68 minutes on the day of the visit.   Time spent by me doing chart review, history and exam, documentation and further activities per the note    Su Hernandez MD  Pager [phone]  Infectious Diseases      History of the Infectious Disease lllness:   CC: Brain mass, h/o TB meningitis    HPI: Manuela Osman is a pleasant 52 year old female with a past medical history of DMII (HbA1c 8.7%), HTN, vitamin D deficiency, and history of treated TB meningitis (2020), who presents to ID clinic with a new brain mass.     Per extensive chart review, Ms. Osman initially presented to an outside hospital in 11/2020 with difficulty walking, dizziness, neck stiffness, and fever. She was covid+ at the time, and underwent an LP on 11/6/20. WBC count was 294 with 51% lymphocytes, protein was 189, and glucose 17. AFB culture ultimately grew pan-susceptible TB. Imaging at the time (11/8/20) showed extensive enhancing nodularity throughout the basal cisterns, encasing the optic chiasm and involving multiple cranial nerves. On 11/8/20 RIPE therapy was started as well as steroids. When the TB was noted to be pan-susceptible, ethambutol and pyrazinamide were stopped (1/2021). She continued INH and rifampin until 4/2021 when she developed peripheral  neuropathy. Due to concern that it was isoniazid related, she was switched to a regimen of rifampin + pyrazinamide + levofloxacin. She did not tolerate the levofloxacin, and was switched back to INH + rifampin. She continued this regimen until 11/2021 (12 months of therapy). In January 2022, she had ID follow up at which time she was doing very well off TB therapy and had improving peripheral neuropathy.     Ms. Osman states that she has generally been doing well since then. However, she has had persistent low back pain. On 2/19/24 she presented to the ED with low back pain and shooting pain down the RLE. She was diagnosed with sciatica and in follow up with her PCP an MRI of the lumbar spine and brain was recommended. MRI brain on 3/4/24 showed a new enhancing lesion in the sella abutting the infundibulum. She was referred to NSGY for further evaluation. She was seen in NSGY clinic on 3/27 with the recommendation for close monitoring and follow up with ID to determine if the lesion could be related to her prior TB meningitis. Labs evaluating for a pituitary adenoma were unremarkable. On 4/20 she presented to the ED with RUE pain and weakness. Vitals were unremarkable. Labs were also unremarkable including a notably normal CRP and ESR. MRI of the brain was repeated with a stable sellar mass.  No LP was performed.     Today, Ms. Osman states she currently has a headache that radiates from her forehead through her scalp to her spine and legs. It is constant and she doesn't notice what makes it worse. She takes some medicine prescribed by her doctor that helps with the headaches. She notes she has daily fevers at 3 pm. She has never measured a fever, but states she feels warm and the headache gets bad around 3 pm every day, so she takes medication that helps. She also notes neck pain and pain shooting from the neck down the R arm. She is not having night sweats, chills, poor appetite, weight loss (has been gaining  weight). She denies shortness of breath, cough, swelling in the neck/armpits/groin, rashes, abdominal pain, or joint pains.     Regarding her prior TB infection, she notes she was unconscious at the time, and doesn't remember what symptoms she had. Her medication was [place] via [place] [facility] She notes she had diarrhea from an antibiotic that landed her in the hospital (levofloxacin?) and that she had severe burning and pain of the lower extremities. She continues to have burning, tingling, and numbness in both legs that keeps her awake at night.   [facility] [place] medicine every day.     Social History:  Lives in [facility]. 5 children at home. 21, 20, 19, 17,11.   No pets in the house. No alcohol, no smoking.      Relevant Microbiology:   2020 CSF AFB culture: Mycobacterium tuberculosis complex        Review of Systems: The remainder of a complete ROS was negative, except as noted in HPI.     No past medical history on file.    Past Surgical History:   Procedure Laterality Date     SECTION      SHOULDER SURGERY         Family History   Problem Relation Age of Onset    Glaucoma No family hx of     Macular Degeneration No family hx of        Social History     Social History Narrative    Not on file     Social History     Tobacco Use    Smoking status: Never    Smokeless tobacco: Never       Immunization History   Administered Date(s) Administered    COVID-19 12+ (-) (MODERNA) 2023    COVID-19 Monovalent 18+ (Moderna) 2021, 2021, 11/10/2021       Patient Active Problem List   Diagnosis    Abnormal vaginal bleeding    Acute kidney injury (H24)    Anemia    COVID-19    Debility    Diabetes mellitus, type II (H)    Dizziness    Dyslipidemia    Early menopause    Encephalopathy    Essential hypertension    Helicobacter pylori infection    Hydronephrosis    Hyperlipidemia    Hyponatremia    Microscopic hematuria    Other reactions to severe stress    Overweight with body  "mass index (BMI) 25.0-29.9    Pain    Microalbuminuria    Pyelonephritis    Retention of urine    Sepsis (H)    Sinus pressure    Tuberculous meningitis    Vitamin D deficiency       Current Outpatient Medications   Medication Sig Dispense Refill    atorvastatin (LIPITOR) 20 MG tablet       benzonatate (TESSALON) 100 MG capsule  (Patient not taking: Reported on 2023)      Calcium Carb-Cholecalciferol 500-10 MG-MCG TABS Calcium 500 With D 500 mg-10 mcg (400 unit) tablet      carboxymethylcellulose PF (CARBOXYMETHYLCELLULOSE SODIUM) 0.5 % ophthalmic solution Place 1 drop into both eyes 4 times daily 400 each 11    cholecalciferol (VITAMIN D3) 125 mcg (5000 units) capsule       enoxaparin ANTICOAGULANT (LOVENOX) 40 MG/0.4ML syringe enoxaparin 40 mg/0.4 mL subcutaneous syringe (Patient not taking: Reported on 2023)      famotidine (PEPCID) 20 MG tablet       FARXIGA 10 MG TABS tablet       fluconazole (DIFLUCAN) 150 MG tablet  (Patient not taking: Reported on 2023)      fluticasone (FLONASE) 50 MCG/ACT nasal spray       gabapentin (NEURONTIN) 300 MG capsule       GOODSENSE PAIN RELIEF EXTRA  MG tablet       HYPROMELLOSE-DEXTRAN 0.3-0.1% opthalmic solution       ketotifen (ZADITOR) 0.025 % ophthalmic solution       losartan (COZAAR) 25 MG tablet       metFORMIN (GLUCOPHAGE XR) 500 MG 24 hr tablet       NOVOLOG FLEXPEN 100 UNIT/ML soln       omeprazole (PRILOSEC) 20 MG DR capsule       TRESIBA FLEXTOUCH 100 UNIT/ML pen       UNKNOWN TO PATIENT Patient does not know any of the medications she is taking.       No current facility-administered medications for this visit.       Allergies   Allergen Reactions    Lisinopril Cough and Itching     Other reaction(s): Other (see comments)    Loratadine Itching     Other reaction(s): Other (see comments), Runny Nose    No Clinical Screening - See Comments      Other reaction(s): Unknown  \"allergy to something given during surgery for \"     Perfume Other " "(See Comments)     Sneezing and congestion  Other reaction(s): Other - Describe In Comment Field  Sneezing and congestion                Physical Exam:   /79   Pulse 63   Temp 98  F (36.7  C) (Oral)   Ht 1.651 m (5' 5\")   Wt 79.9 kg (176 lb 1.6 oz)   SpO2 100%   BMI 29.30 kg/m    Wt Readings from Last 4 Encounters:   24 81.6 kg (180 lb)   23 76.7 kg (169 lb 1.6 oz)     Exam:   GENERAL: well-developed, well-nourished, alert, oriented, in no acute distress.  HEAD: Head is normocephalic, atraumatic   EYES: Eyes have anicteric sclerae.    ENT: Oropharynx is moist without exudates or ulcers.  NECK: Supple. Full ROM w/ no meningismus.   LUNGS: Clear to auscultation b/l. Normal WOB on RA.   CARDIOVASCULAR: Regular rate and rhythm with no murmurs, gallops or rubs.  ABDOMEN: Normal bowel sounds, soft, nontender.  SKIN: No acute rashes. Wounds on b/l Les.   NEUROLOGIC: Grossly nonfocal. Strength 5/5 and symmetric in b/l upper and lower extremities. CN II-XII intact.          Laboratory Data:     Metabolic Studies    Recent Labs   Lab Test 24  1034      POTASSIUM 4.1   CHLORIDE 105   CO2 28   ANIONGAP 11   BUN 13.2   CR 1.03*   GFRESTIMATED 66   *   TOSHIA 9.8   RANDY 10.4     Imagin24 MRI Brain  Impression    1. No interval change.  2. No acute intracranial abnormality.  3. Normal brain parenchymal morphology. Scattered nonspecific foci of T2 signal within the white matter of both cerebral hemispheres likely represents chronic deep white matter small vessel ischemic changes or [place] of migraine headache. There is an old lacunar infarct of the left centrum semiovale.  4. No abnormal enhancement or enhancing lesions within the brain parenchyma  5. Compared to the previous MRI, stable size and appearance of an enhancing nodule within the right aspect of the sella abutting the infundibulum and separate from the pituitary gland. Differential considerations again include a stable " craniopharyngioma or pituicytoma.  6. Small right mastoid effusion      Su Hernandez MD     No assistance needed

## 2024-05-16 ENCOUNTER — APPOINTMENT (OUTPATIENT)
Dept: UROLOGY | Facility: CLINIC | Age: 39
End: 2024-05-16

## 2024-06-05 ENCOUNTER — APPOINTMENT (OUTPATIENT)
Dept: UROLOGY | Facility: CLINIC | Age: 39
End: 2024-06-05
Payer: MEDICAID

## 2024-06-05 VITALS
BODY MASS INDEX: 23.73 KG/M2 | HEART RATE: 61 BPM | SYSTOLIC BLOOD PRESSURE: 114 MMHG | DIASTOLIC BLOOD PRESSURE: 77 MMHG | HEIGHT: 64 IN | WEIGHT: 139 LBS | RESPIRATION RATE: 16 BRPM | OXYGEN SATURATION: 98 % | TEMPERATURE: 97.5 F

## 2024-06-05 DIAGNOSIS — R33.9 RETENTION OF URINE, UNSPECIFIED: ICD-10-CM

## 2024-06-05 PROCEDURE — 99213 OFFICE O/P EST LOW 20 MIN: CPT

## 2024-06-05 NOTE — PHYSICAL EXAM
[Normal Appearance] : normal appearance [Edema] : no peripheral edema [Exaggerated Use Of Accessory Muscles For Inspiration] : no accessory muscle use [Bowel Sounds] : normal bowel sounds [Abdomen Tenderness] : non-tender [de-identified] : No suprapubic fullness

## 2024-06-05 NOTE — ASSESSMENT
[FreeTextEntry1] : 38-year-old female with urinary tension during pregnancy, and bladder injury at time of  status post complex repair  She is doing well and voiding on her own.  PVR today  between 43 and 88 cc on multiple measurements.  She feels well and is emptying her bladder.  No UTIs.  We discussed the etiology of her retention is still unclear.  Discussed urodynamics would be the best test to evaluate her bladder functioning.  Discussed how this is performed.  She is inquiring if this is necessary or if she can continue to manage expectantly.  She is doing well and emptying her bladder adequately.  He has no UTIs.  We will continue with expectant management.  She will follow-up in 3 months with an ultrasound

## 2024-06-05 NOTE — HISTORY OF PRESENT ILLNESS
[FreeTextEntry1] : 38-year-old female who presents for follow-up  Former patient of Dr. vila  During pregnancy, she was unable to urinate and had urinary hesitancy. She ultimately required CIC during pregnancy. She had a bladder injury at time of  - was told "uterus fused to bladder." She had ureteral stents and a catheter placed after surgery, which were removed. Cystoscopy 3/2024 showing a well-healed bladder.   Was last seen  and was referred to Dr. Asif Pitts due to abnormal voiding  Reportedly, she is no longer catheterizing and is voiding on her own. She had UTIs for ~2 months following catheter removal, but none since. No longer on tamsulosin. Currently, she is voiding every 4 hours. No gross hematuria, dysuria. No urgency or urge incontinence, but has mild FADUMO since the pregnancy. This will be her last pregnancy - she has more embryos but plans to use a surrogate.  US 2024: Findings: Right kidney upper pole dilated calyx and mid pole hyperechoic non-vascular round structure measuring 5mm. Left renal pelvis fullness. Both kidneys are normal in size and echogenicity without hydronephrosis, stones or solid masses present.

## 2024-06-24 NOTE — ED PROVIDER NOTE - WET READ LAUNCH FT
NURSING DISCHARGE NOTE    Discharged Home via Ambulatory.  Accompanied by Support staff  Belongings Taken by patient/family.    PIV discontinued, patient's discharge instructions completed with Gary AREVALO, no questions regarding discharge.   There are no Wet Read(s) to document.

## 2024-08-27 ENCOUNTER — LABORATORY RESULT (OUTPATIENT)
Age: 39
End: 2024-08-27

## 2024-08-27 ENCOUNTER — OUTPATIENT (OUTPATIENT)
Dept: OUTPATIENT SERVICES | Facility: HOSPITAL | Age: 39
LOS: 1 days | End: 2024-08-27
Payer: MEDICAID

## 2024-08-27 ENCOUNTER — APPOINTMENT (OUTPATIENT)
Age: 39
End: 2024-08-27
Payer: MEDICAID

## 2024-08-27 VITALS
BODY MASS INDEX: 23.22 KG/M2 | HEART RATE: 65 BPM | SYSTOLIC BLOOD PRESSURE: 107 MMHG | DIASTOLIC BLOOD PRESSURE: 73 MMHG | TEMPERATURE: 97.9 F | HEIGHT: 64 IN | OXYGEN SATURATION: 100 % | WEIGHT: 136 LBS | RESPIRATION RATE: 16 BRPM

## 2024-08-27 DIAGNOSIS — Z90.49 ACQUIRED ABSENCE OF OTHER SPECIFIED PARTS OF DIGESTIVE TRACT: Chronic | ICD-10-CM

## 2024-08-27 DIAGNOSIS — D68.00 VON WILLEBRAND DISEASE, UNSPECIFIED: ICD-10-CM

## 2024-08-27 DIAGNOSIS — Z98.890 OTHER SPECIFIED POSTPROCEDURAL STATES: Chronic | ICD-10-CM

## 2024-08-27 DIAGNOSIS — D50.9 IRON DEFICIENCY ANEMIA, UNSPECIFIED: ICD-10-CM

## 2024-08-27 DIAGNOSIS — Z90.79 ACQUIRED ABSENCE OF OTHER GENITAL ORGAN(S): Chronic | ICD-10-CM

## 2024-08-27 LAB
HCT VFR BLD CALC: 34.3 %
HGB BLD-MCNC: 10.8 G/DL
MCHC RBC-ENTMCNC: 26.9 PG
MCHC RBC-ENTMCNC: 31.5 G/DL
MCV RBC AUTO: 85.3 FL
PLATELET # BLD AUTO: 232 K/UL
PMV BLD: 9.1 FL
RBC # BLD: 4.02 M/UL
RBC # FLD: 14.4 %
WBC # FLD AUTO: 7.09 K/UL

## 2024-08-27 PROCEDURE — 99213 OFFICE O/P EST LOW 20 MIN: CPT

## 2024-08-27 PROCEDURE — 86431 RHEUMATOID FACTOR QUANT: CPT

## 2024-08-27 PROCEDURE — 86200 CCP ANTIBODY: CPT

## 2024-08-27 PROCEDURE — 80053 COMPREHEN METABOLIC PANEL: CPT

## 2024-08-27 PROCEDURE — 82728 ASSAY OF FERRITIN: CPT

## 2024-08-27 PROCEDURE — 84443 ASSAY THYROID STIM HORMONE: CPT

## 2024-08-27 PROCEDURE — 85027 COMPLETE CBC AUTOMATED: CPT

## 2024-08-28 DIAGNOSIS — D50.9 IRON DEFICIENCY ANEMIA, UNSPECIFIED: ICD-10-CM

## 2024-08-28 LAB
ALBUMIN SERPL ELPH-MCNC: 4.9 G/DL
ALP BLD-CCNC: 99 U/L
ALT SERPL-CCNC: 11 U/L
ANION GAP SERPL CALC-SCNC: 14 MMOL/L
AST SERPL-CCNC: 14 U/L
BILIRUB SERPL-MCNC: 0.4 MG/DL
BUN SERPL-MCNC: 17 MG/DL
CALCIUM SERPL-MCNC: 9.8 MG/DL
CCP AB SER IA-ACNC: <8 U/ML
CHLORIDE SERPL-SCNC: 101 MMOL/L
CO2 SERPL-SCNC: 24 MMOL/L
CREAT SERPL-MCNC: 0.7 MG/DL
EGFR: 115 ML/MIN/1.73M2
FERRITIN SERPL-MCNC: 13 NG/ML
GLUCOSE SERPL-MCNC: 92 MG/DL
POTASSIUM SERPL-SCNC: 4.4 MMOL/L
PROT SERPL-MCNC: 7.9 G/DL
RF+CCP IGG SER-IMP: NEGATIVE
RHEUMATOID FACT SER QL: 10 IU/ML
SODIUM SERPL-SCNC: 139 MMOL/L
TSH SERPL-ACNC: 2.82 UIU/ML

## 2024-08-28 NOTE — HISTORY OF PRESENT ILLNESS
[de-identified] : 34 year old female with Von Willebrand disease type 1 , DDAVP responsive , diagnosed in her teens with menorrhagia , severe anemia required transfusions and parenteral iron . \par  She had only modest response to intranasal DDAVP and tranexamic acid however menstrual bleeding is controlled with OCPs and has not required transfusions in many years . Most recent Hb is normal . She continues to experience excessive bleeding with dental work . \par   [de-identified] : 2020 Today's encounter was carried out via telehealth at her request and in compliance with [facility] guidelines given the extreme circumstances surrounding Covid 19 out break . 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 Her menstrual bleeding is well controlled with OCPs but continues to report excessive bleeding with dental work ( improved slightly with tranexamic acid ) . Denies any other bleeding symptoms .  She is now scheduled for egg harvest in early October .   2022 Patient returns one month after spontaneous fetal loss at 9 1/2 weeks , pathology was inconclusive . Since then she has persistent bleeding , heavy and period like at times . She received one unit rbc and  Humate-P 3 weeks ago for Hgb < 8 . She had  biopsy today for mild endometrial thickening , she resumed OCPs ,  is using tranexemic acid and intranasal DDAVP . She notes some reduction in the bleeding . she has a also received venofer X 4 in last 3 weeks . She denies weakness, dizziness or SOB .   2023 Patient returns for follow up ,she is 11 weeks pregnant after successful IVF . she is on aspirin and denies any abnormal bleeding . she takes prenatal vitamins , she denies fatigue or SOB .   2024 Patient returns for follow up nearly 6 months after C section , she had urinary tension during pregnancy, and bladder injury at time of  status post complex repair . She  experienced moderate bleeding and required factor 8 concentrates postoperatively . She denies any urinary symptoms at this time , She is breastfeeding and denies heavy menses. Main complaint is mild fatigue , generalized joint pain and morning stiffness.

## 2024-08-28 NOTE — HISTORY OF PRESENT ILLNESS
[de-identified] : 34 year old female with Von Willebrand disease type 1 , DDAVP responsive , diagnosed in her teens with menorrhagia , severe anemia required transfusions and parenteral iron . \par  She had only modest response to intranasal DDAVP and tranexamic acid however menstrual bleeding is controlled with OCPs and has not required transfusions in many years . Most recent Hb is normal . She continues to experience excessive bleeding with dental work . \par   [de-identified] : 2020 Today's encounter was carried out via telehealth at her request and in compliance with [facility] guidelines given the extreme circumstances surrounding Covid 19 out break . 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 Her menstrual bleeding is well controlled with OCPs but continues to report excessive bleeding with dental work ( improved slightly with tranexamic acid ) . Denies any other bleeding symptoms .  She is now scheduled for egg harvest in early October .   2022 Patient returns one month after spontaneous fetal loss at 9 1/2 weeks , pathology was inconclusive . Since then she has persistent bleeding , heavy and period like at times . She received one unit rbc and  Humate-P 3 weeks ago for Hgb < 8 . She had  biopsy today for mild endometrial thickening , she resumed OCPs ,  is using tranexemic acid and intranasal DDAVP . She notes some reduction in the bleeding . she has a also received venofer X 4 in last 3 weeks . She denies weakness, dizziness or SOB .   2023 Patient returns for follow up ,she is 11 weeks pregnant after successful IVF . she is on aspirin and denies any abnormal bleeding . she takes prenatal vitamins , she denies fatigue or SOB .   2024 Patient returns for follow up nearly 6 months after C section , she had urinary tension during pregnancy, and bladder injury at time of  status post complex repair . She  experienced moderate bleeding and required factor 8 concentrates postoperatively . She denies any urinary symptoms at this time , She is breastfeeding and denies heavy menses. Main complaint is mild fatigue , generalized joint pain and morning stiffness.

## 2024-08-28 NOTE — ASSESSMENT
[FreeTextEntry1] : 36 year old female with VWD type 2A s/p IVF and successful pregnancy complicated by pre eclampsia , urinary retention , Bladder injury during C section with gross hematuria . now resolved .  CBC shows Hgb : 10.8 , no menorrhagia  Mild arthralgias.  Plan : repeat cbc , ferritin , B12, RF ESR , anti CCP .

## 2024-08-28 NOTE — HISTORY OF PRESENT ILLNESS
[de-identified] : 34 year old female with Von Willebrand disease type 1 , DDAVP responsive , diagnosed in her teens with menorrhagia , severe anemia required transfusions and parenteral iron . \par  She had only modest response to intranasal DDAVP and tranexamic acid however menstrual bleeding is controlled with OCPs and has not required transfusions in many years . Most recent Hb is normal . She continues to experience excessive bleeding with dental work . \par   [de-identified] : 2020 Today's encounter was carried out via telehealth at her request and in compliance with [facility] guidelines given the extreme circumstances surrounding Covid 19 out break . 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 Her menstrual bleeding is well controlled with OCPs but continues to report excessive bleeding with dental work ( improved slightly with tranexamic acid ) . Denies any other bleeding symptoms .  She is now scheduled for egg harvest in early October .   2022 Patient returns one month after spontaneous fetal loss at 9 1/2 weeks , pathology was inconclusive . Since then she has persistent bleeding , heavy and period like at times . She received one unit rbc and  Humate-P 3 weeks ago for Hgb < 8 . She had  biopsy today for mild endometrial thickening , she resumed OCPs ,  is using tranexemic acid and intranasal DDAVP . She notes some reduction in the bleeding . she has a also received venofer X 4 in last 3 weeks . She denies weakness, dizziness or SOB .   2023 Patient returns for follow up ,she is 11 weeks pregnant after successful IVF . she is on aspirin and denies any abnormal bleeding . she takes prenatal vitamins , she denies fatigue or SOB .   2024 Patient returns for follow up nearly 6 months after C section , she had urinary tension during pregnancy, and bladder injury at time of  status post complex repair . She  experienced moderate bleeding and required factor 8 concentrates postoperatively . She denies any urinary symptoms at this time , She is breastfeeding and denies heavy menses. Main complaint is mild fatigue , generalized joint pain and morning stiffness.

## 2024-08-29 NOTE — BEGINNING OF VISIT
[0] : 2) Feeling down, depressed, or hopeless: Not at all (0) [LXT2Itvlk] : 0 [Never] : Never [Patient/Caregiver not ready to engage] : Patient/Caregiver not ready to engage [Vomiting] : no vomiting [Constipation] : no constipation

## 2024-08-29 NOTE — BEGINNING OF VISIT
[0] : 2) Feeling down, depressed, or hopeless: Not at all (0) [[id number]] : 0 [Never] : Never [Patient/Caregiver not ready to engage] : Patient/Caregiver not ready to engage [Vomiting] : no vomiting [Constipation] : no constipation

## 2024-08-29 NOTE — BEGINNING OF VISIT
[0] : 2) Feeling down, depressed, or hopeless: Not at all (0) [[id number]] : 0 [Never] : Never [Patient/Caregiver not ready to engage] : Patient/Caregiver not ready to engage [Vomiting] : no vomiting [Constipation] : no constipation

## 2024-09-10 ENCOUNTER — APPOINTMENT (OUTPATIENT)
Dept: UROLOGY | Facility: CLINIC | Age: 39
End: 2024-09-10
Payer: MEDICAID

## 2024-09-10 DIAGNOSIS — R33.9 RETENTION OF URINE, UNSPECIFIED: ICD-10-CM

## 2024-09-10 DIAGNOSIS — N39.0 URINARY TRACT INFECTION, SITE NOT SPECIFIED: ICD-10-CM

## 2024-09-10 PROCEDURE — 76775 US EXAM ABDO BACK WALL LIM: CPT

## 2024-09-10 PROCEDURE — 99213 OFFICE O/P EST LOW 20 MIN: CPT | Mod: 25

## 2024-09-10 NOTE — HISTORY OF PRESENT ILLNESS
[FreeTextEntry1] : 38-year-old female who presents as a follow-up for neurogenic bladder  To review, during pregnancy she was unable to urinate and had urinary hesitancy. She ultimately required CIC during pregnancy. She had a bladder injury at time of  - was told "uterus fused to bladder." She had ureteral stents and a catheter placed after surgery, which were removed. Cystoscopy 3/2024 showing a well-healed bladder.  Was seen  and was referred to Dr. Asif Pitts due to abnormal voiding  Currently, she is voiding spontaneously.  No UTIs in the past several months.  No longer on tamsulosin. Currently, she is voiding every 4 hours. No gross hematuria, dysuria. No urgency or urge incontinence, but has mild FADUMO since the pregnancy. This will be her last pregnancy - she has more embryos but plans to use a surrogate.  US 2024: Findings: Right kidney upper pole dilated calyx and mid pole hyperechoic non-vascular round structure measuring 5mm. Left renal pelvis fullness. Both kidneys are normal in size and echogenicity without hydronephrosis, stones or solid masses present.  CT cystogram reviewed: Mild fullness of right kidney, trabeculated bladder

## 2024-09-10 NOTE — LETTER BODY
[Dear  ___] : Dear  [unfilled], [Consult Letter:] : I had the pleasure of evaluating your patient, [unfilled]. [Please see my note below.] : Please see my note below. [Consult Closing:] : Thank you very much for allowing me to participate in the care of this patient.  If you have any questions, please do not hesitate to contact me. [Sincerely,] : Sincerely, [FreeTextEntry2] : Kelly Hayes  [facility] [address], [place] [address] [FreeTextEntry3] : Zackary Rea MD [facility] at [address], [address] [place] [address] p: [phone] f: [phone]

## 2024-09-10 NOTE — PHYSICAL EXAM
[Normal Appearance] : normal appearance [Edema] : no peripheral edema [] : no respiratory distress [Abdomen Tenderness] : non-tender [Bowel Sounds] : normal bowel sounds

## 2024-09-10 NOTE — ASSESSMENT
[FreeTextEntry1] : 38-year-old female who presents for follow-up  She has a history of urinary retention during pregnancy, bladder injury during pregnancy.  She is currently voiding on her own without bothersome urinary symptoms.  Last creatinine 0.7. PVR 20 mL on US  She does still have mild fullness of the right upper calyx. She also has trabeculations of her bladder. We discussed the etiology of her retention is still unclear.  We did discuss that I suspect she has abnormal voiding parameters, and may have a poorly compliant bladder causing upper tract changes. Discussed urodynamics would be the best test to evaluate her bladder functioning. Discussed how this is performed. She would like to proceed  Schedule for VUDS PAST SURGICAL HISTORY:  H/O arthroscopy of knee 2012    H/O arthroscopy of knee 9-2019  left wearing brace to help foot turn inward

## 2024-09-12 ENCOUNTER — OUTPATIENT (OUTPATIENT)
Dept: OUTPATIENT SERVICES | Facility: HOSPITAL | Age: 39
LOS: 1 days | End: 2024-09-12
Payer: MEDICAID

## 2024-09-12 ENCOUNTER — APPOINTMENT (OUTPATIENT)
Age: 39
End: 2024-09-12

## 2024-09-12 VITALS
HEART RATE: 61 BPM | DIASTOLIC BLOOD PRESSURE: 71 MMHG | RESPIRATION RATE: 12 BRPM | SYSTOLIC BLOOD PRESSURE: 105 MMHG | TEMPERATURE: 98 F

## 2024-09-12 DIAGNOSIS — Z90.79 ACQUIRED ABSENCE OF OTHER GENITAL ORGAN(S): Chronic | ICD-10-CM

## 2024-09-12 DIAGNOSIS — Z90.49 ACQUIRED ABSENCE OF OTHER SPECIFIED PARTS OF DIGESTIVE TRACT: Chronic | ICD-10-CM

## 2024-09-12 DIAGNOSIS — D50.9 IRON DEFICIENCY ANEMIA, UNSPECIFIED: ICD-10-CM

## 2024-09-12 DIAGNOSIS — Z98.890 OTHER SPECIFIED POSTPROCEDURAL STATES: Chronic | ICD-10-CM

## 2024-09-12 LAB — BACTERIA UR CULT: NORMAL

## 2024-09-12 PROCEDURE — 96365 THER/PROPH/DIAG IV INF INIT: CPT

## 2024-09-12 RX ORDER — IRON SUCROSE 20 MG/ML
200 INJECTION, SOLUTION INTRAVENOUS ONCE
Refills: 0 | Status: COMPLETED | OUTPATIENT
Start: 2024-09-12 | End: 2024-09-12

## 2024-09-12 RX ADMIN — IRON SUCROSE 200 MILLIGRAM(S): 20 INJECTION, SOLUTION INTRAVENOUS at 15:40

## 2024-09-12 RX ADMIN — IRON SUCROSE 200 MILLIGRAM(S): 20 INJECTION, SOLUTION INTRAVENOUS at 15:09

## 2024-09-13 DIAGNOSIS — D50.9 IRON DEFICIENCY ANEMIA, UNSPECIFIED: ICD-10-CM

## 2024-09-15 NOTE — OB RN TRIAGE NOTE - NS_TRIAGETIMEOF NOTIFICATION_OBGYN_ALL_OB_DT
Goal Outcome Evaluation:      Plan of Care Reviewed With: patient    Overall Patient Progress: improvingOverall Patient Progress: improving    Outcome Evaluation: VSS. Afebrile. On RA. Appetite is increasing. Up ambulating more.    Patient feeling better today, appetite good. PT/OT test drive, patient tolerated well.      07-Jan-2024 20:22

## 2024-09-19 ENCOUNTER — APPOINTMENT (OUTPATIENT)
Age: 39
End: 2024-09-19

## 2024-09-26 ENCOUNTER — OUTPATIENT (OUTPATIENT)
Dept: OUTPATIENT SERVICES | Facility: HOSPITAL | Age: 39
LOS: 1 days | End: 2024-09-26
Payer: MEDICAID

## 2024-09-26 ENCOUNTER — APPOINTMENT (OUTPATIENT)
Age: 39
End: 2024-09-26

## 2024-09-26 DIAGNOSIS — Z98.890 OTHER SPECIFIED POSTPROCEDURAL STATES: Chronic | ICD-10-CM

## 2024-09-26 DIAGNOSIS — Z90.79 ACQUIRED ABSENCE OF OTHER GENITAL ORGAN(S): Chronic | ICD-10-CM

## 2024-09-26 DIAGNOSIS — D50.9 IRON DEFICIENCY ANEMIA, UNSPECIFIED: ICD-10-CM

## 2024-09-26 DIAGNOSIS — Z90.49 ACQUIRED ABSENCE OF OTHER SPECIFIED PARTS OF DIGESTIVE TRACT: Chronic | ICD-10-CM

## 2024-09-26 PROCEDURE — 96365 THER/PROPH/DIAG IV INF INIT: CPT

## 2024-09-26 RX ORDER — IRON SUCROSE 20 MG/ML
200 INJECTION, SOLUTION INTRAVENOUS ONCE
Refills: 0 | Status: COMPLETED | OUTPATIENT
Start: 2024-09-26 | End: 2024-09-26

## 2024-09-26 RX ADMIN — IRON SUCROSE 200 MILLIGRAM(S): 20 INJECTION, SOLUTION INTRAVENOUS at 14:51

## 2024-09-26 RX ADMIN — IRON SUCROSE 200 MILLIGRAM(S): 20 INJECTION, SOLUTION INTRAVENOUS at 14:21

## 2024-10-02 ENCOUNTER — APPOINTMENT (OUTPATIENT)
Dept: UROLOGY | Facility: CLINIC | Age: 39
End: 2024-10-02
Payer: MEDICAID

## 2024-10-02 ENCOUNTER — OUTPATIENT (OUTPATIENT)
Dept: OUTPATIENT SERVICES | Facility: HOSPITAL | Age: 39
LOS: 1 days | Discharge: HOME | End: 2024-10-02

## 2024-10-02 VITALS — DIASTOLIC BLOOD PRESSURE: 82 MMHG | OXYGEN SATURATION: 100 % | SYSTOLIC BLOOD PRESSURE: 132 MMHG | HEART RATE: 69 BPM

## 2024-10-02 DIAGNOSIS — Z90.79 ACQUIRED ABSENCE OF OTHER GENITAL ORGAN(S): Chronic | ICD-10-CM

## 2024-10-02 DIAGNOSIS — D68.00 VON WILLEBRAND DISEASE, UNSPECIFIED: ICD-10-CM

## 2024-10-02 DIAGNOSIS — Z90.49 ACQUIRED ABSENCE OF OTHER SPECIFIED PARTS OF DIGESTIVE TRACT: Chronic | ICD-10-CM

## 2024-10-02 DIAGNOSIS — S31.000A UNSPECIFIED INJURY OF BLADDER, INITIAL ENCOUNTER: ICD-10-CM

## 2024-10-02 DIAGNOSIS — R33.9 RETENTION OF URINE, UNSPECIFIED: ICD-10-CM

## 2024-10-02 DIAGNOSIS — Z98.890 OTHER SPECIFIED POSTPROCEDURAL STATES: Chronic | ICD-10-CM

## 2024-10-02 DIAGNOSIS — S37.20XA UNSPECIFIED INJURY OF BLADDER, INITIAL ENCOUNTER: ICD-10-CM

## 2024-10-02 PROCEDURE — 51741 ELECTRO-UROFLOWMETRY FIRST: CPT | Mod: 26

## 2024-10-02 PROCEDURE — 51797 INTRAABDOMINAL PRESSURE TEST: CPT | Mod: 26

## 2024-10-02 PROCEDURE — 51600 INJECTION FOR BLADDER X-RAY: CPT

## 2024-10-02 PROCEDURE — 51728 CYSTOMETROGRAM W/VP: CPT | Mod: 26

## 2024-10-02 PROCEDURE — 51784 ANAL/URINARY MUSCLE STUDY: CPT | Mod: 26

## 2024-10-02 PROCEDURE — 74455 X-RAY URETHRA/BLADDER: CPT | Mod: 26,59

## 2024-10-03 ENCOUNTER — OUTPATIENT (OUTPATIENT)
Dept: OUTPATIENT SERVICES | Facility: HOSPITAL | Age: 39
LOS: 1 days | End: 2024-10-03
Payer: MEDICAID

## 2024-10-03 ENCOUNTER — APPOINTMENT (OUTPATIENT)
Age: 39
End: 2024-10-03

## 2024-10-03 DIAGNOSIS — D50.9 IRON DEFICIENCY ANEMIA, UNSPECIFIED: ICD-10-CM

## 2024-10-03 DIAGNOSIS — Z98.890 OTHER SPECIFIED POSTPROCEDURAL STATES: Chronic | ICD-10-CM

## 2024-10-03 DIAGNOSIS — Z90.49 ACQUIRED ABSENCE OF OTHER SPECIFIED PARTS OF DIGESTIVE TRACT: Chronic | ICD-10-CM

## 2024-10-03 DIAGNOSIS — Z90.79 ACQUIRED ABSENCE OF OTHER GENITAL ORGAN(S): Chronic | ICD-10-CM

## 2024-10-03 PROCEDURE — 96365 THER/PROPH/DIAG IV INF INIT: CPT

## 2024-10-03 RX ORDER — IRON SUCROSE 20 MG/ML
200 INJECTION, SOLUTION INTRAVENOUS ONCE
Refills: 0 | Status: COMPLETED | OUTPATIENT
Start: 2024-10-03 | End: 2024-10-03

## 2024-10-03 RX ADMIN — IRON SUCROSE 200 MILLIGRAM(S): 20 INJECTION, SOLUTION INTRAVENOUS at 15:53

## 2024-10-04 DIAGNOSIS — D50.9 IRON DEFICIENCY ANEMIA, UNSPECIFIED: ICD-10-CM

## 2024-11-08 NOTE — OB RN TRIAGE NOTE - CHIEF COMPLAINT QUOTE
spouse Decreased fetal movement Decreased fetal movement , prenatal care at [facility] . Decreased fetal movement , prenatal care at [facility] . Decreased fetal movement , prenatal care at [facility] .

## 2024-12-16 ENCOUNTER — RESULT REVIEW (OUTPATIENT)
Age: 39
End: 2024-12-16

## 2024-12-16 ENCOUNTER — NON-APPOINTMENT (OUTPATIENT)
Age: 39
End: 2024-12-16

## 2024-12-16 ENCOUNTER — APPOINTMENT (OUTPATIENT)
Dept: HEMATOLOGY ONCOLOGY | Facility: CLINIC | Age: 39
End: 2024-12-16
Payer: MEDICAID

## 2024-12-16 VITALS
RESPIRATION RATE: 16 BRPM | DIASTOLIC BLOOD PRESSURE: 67 MMHG | WEIGHT: 138.89 LBS | HEIGHT: 63 IN | HEART RATE: 66 BPM | TEMPERATURE: 98.1 F | OXYGEN SATURATION: 100 % | BODY MASS INDEX: 24.61 KG/M2 | SYSTOLIC BLOOD PRESSURE: 98 MMHG

## 2024-12-16 DIAGNOSIS — Z78.9 OTHER SPECIFIED HEALTH STATUS: ICD-10-CM

## 2024-12-16 DIAGNOSIS — D68.00 VON WILLEBRAND DISEASE, UNSPECIFIED: ICD-10-CM

## 2024-12-16 DIAGNOSIS — Z87.42 PERSONAL HISTORY OF OTHER DISEASES OF THE FEMALE GENITAL TRACT: ICD-10-CM

## 2024-12-16 DIAGNOSIS — Z92.89 PERSONAL HISTORY OF OTHER MEDICAL TREATMENT: ICD-10-CM

## 2024-12-16 PROCEDURE — 99204 OFFICE O/P NEW MOD 45 MIN: CPT

## 2024-12-20 LAB
ALBUMIN SERPL ELPH-MCNC: 4.6 G/DL
ALP BLD-CCNC: 100 U/L
ALT SERPL-CCNC: 13 U/L
ANION GAP SERPL CALC-SCNC: 13 MMOL/L
AST SERPL-CCNC: 15 U/L
BILIRUB SERPL-MCNC: 0.2 MG/DL
BUN SERPL-MCNC: 19 MG/DL
CALCIUM SERPL-MCNC: 9.5 MG/DL
CHLORIDE SERPL-SCNC: 102 MMOL/L
CO2 SERPL-SCNC: 24 MMOL/L
CREAT SERPL-MCNC: 0.64 MG/DL
EGFR: 117 ML/MIN/1.73M2
FACT VIII ACT/NOR PPP: 100 %
FERRITIN SERPL-MCNC: 24 NG/ML
GLUCOSE SERPL-MCNC: 102 MG/DL
IRON SATN MFR SERPL: 12 %
IRON SERPL-MCNC: 44 UG/DL
POTASSIUM SERPL-SCNC: 4.2 MMOL/L
PROT SERPL-MCNC: 7.3 G/DL
SODIUM SERPL-SCNC: 139 MMOL/L
TIBC SERPL-MCNC: 381 UG/DL
UIBC SERPL-MCNC: 337 UG/DL

## 2025-02-12 NOTE — PROGRESS NOTE ADULT - ASSESSMENT
35F on POD 2 s/p ex lap, KENDALL, left salpingectomy and fulguration of right fallopian tube, intraoperative general surgery consult for KENDALL.    Plan:    Can start clear liquid diet  encourage ambulation  having gas, no BM  IS  F/U hemoglobin  DVT PPX  Protonix 40 mg BID    Spectra: [id number]
A/P: 34yo PMH of VWD2A, hx of ruptured appendicitis with ex-lap in [place], s/p ex lap, extensive KENDALL, left salpingectomy right fallopian tube fulguration, EBL 200c, POD#2, doing well  - continue routine postop management  - VS [id number]    - ambulation and PO hydration encouraged  - DVT ppx: SCDs  - incentive spirometry at bedside  - s/p ancef x24 hours  - NGT self dc'ed on 10/15  - diet per surgery, advance to clears  - 48hours of Humate-P 1200 IU q12hr for 48 hr per hemonc (ordered) - 4cc/min  - f/u doses given (2330, 1130) - last given 10/15 @2330    Dr. Ramirez to be made aware.
34yo PMH of VWD2A, hx of ruptured appendicitis with xlap in [place], s/p ex lap, extensive KENDALL, left salpingectomy right fallopian tube fulguration, EBL 200c, POD#1 doing well.   - VS [id number]  encourage ambulation   - DVT ppx: SCDs  - Incentive Spirometry bedside and encouraged   - f/u AM CBC   - Vital signs [id number]   -ancef for 24 hours  -dc until NGT discontinued  -ERAS 24 hr  -dinlaudid, toradol, ERAS  -recc discontinuation of NGT will advance to clears after d/cd  [ ] 48hours of Humate-P 1200 IU q12hr for 48 hr per hemonc (ordered) - 4cc/min  - f/u doses given (2330, 1130) - last given 10/14 @2330    Dr. Ramirez to be aware 
35F on POD 1 s/p ex lap, KENDALL, left salpingectomy and fulguration of right fallopian tube, intraoperative general surgery consult for KEDNALL.    Plan:    NGT removed  Clear liquid diet  encourage ambulation  having gas, no BM  IS  F/U hemoglobin  DVT PPX  Protonix 40 mg BID    Spectra: [id number]
35F on POD 3 s/p ex lap, KENDALL, left salpingectomy and fulguration of right fallopian tube, intraoperative general surgery consult for KENDALL.    Plan:    regular diet  ambulating  voiding  having gas, no BM  IS  DVT PPX  Protonix 40 mg BID  Patient is stable    Spectra: [id number]
A/P: 36yo PMH of VWD2A, hx of ruptured appendicitis with ex-lap in [place], s/p ex lap, extensive KENDALL, left salpingectomy and right fallopian tube fulguration, EBL 200c, POD#3, doing well  - continue routine postop management  - VS [id number]    - ambulation and PO hydration encouraged  - DVT ppx: SCDs  - incentive spirometry at bedside  - s/p ancef x24 hours  - NGT self dc'ed on 10/15  - regular diet, tolerating well  - s/p 48hours of Humate-P 1200 IU   - discharge documents prepped    Dr. Ramirez aware.
Activity as tolerated

## 2025-02-27 ENCOUNTER — APPOINTMENT (OUTPATIENT)
Age: 40
End: 2025-02-27

## 2025-03-12 DIAGNOSIS — D68.00 VON WILLEBRAND DISEASE, UNSPECIFIED: ICD-10-CM

## 2025-04-09 NOTE — DISCHARGE NOTE PROVIDER - NSDCHC_MEDRECSTATUS_GEN_ALL_CORE
[facility] Progress Note    Chief Complaint   Patient presents with    Migraine     ER 03/30/2025 Migraine    Test Results     CT Brain & MRI Brain  & labs 03/30/2025     As per my initial H&P from 11/3/2022:  \"  Crys Carter is a 48 year old, who presents for evaluation of migraines and headaches.     Patient states she has headaches since age 18. She mainly notes headache on the right side and behind the eyes and may have visual aura and nausea associated with headaches.   She previously was having these every other week when she was younger.  She was started on Imitrex in her early 20s and did not start on preventive therapy until her 30s or 40s, after she had been having migraines 1-2 times per week.  She was on Topamax 50 mg bid and well controlled for 1-2 yrs only having migraine once every 2 months.  She had weight loss surgery 3/2022 and started to have worsening migraines after this time.  She was having once every 2-3 weeks but they became more frequent.  She was recommended to increase the dose of Topamax up to 100 mg bid but subsequently had brain fog and \"suicidal thoughts\" and is being tapered off by her PCP recently.  She is currently on 50 mg AM / 100 mg nightly.      She does have history of bipolar disorder and had been on lamotrigine as well as sertraline and Latuda when she was originally on Topamax but recently has been changed from Latuda to Abilify.      Currently, her mood is fluctuating is having intermittent suicidal thoughts but overall improved and is doing outpatient therapy program.       Currently, she is having at lest 8-10 migraines per month; these sometimes respond to sumatriptan when used but not always and overall estimates less than 50% effective at the 100 mg dose.     Otherwise, patient denies any recent weight change, fevers, chills, nausea, double vision/ blurry vision / loss of vision, chest pain, palpitations, shortness of breath, rashes, joint  pains, bowel / bladder incontinence or mood issues. \"       Prior notes as per 4/13/2023.  Patient overall since last visit had been doing better in terms of migraines and was not having migraines for ~ 3 months but in the past 3-4 weeks, she has a new type of headache.        She has been having some sharp pains behind left eye, and also having some cloudy vision in the left eye.  She denies pain when moving eyes from side to side but has noted her left more than right eye was tearing with these events; she denies sweating on one side of the face; notes improvement when sitting and may worsen when lying down; these may occur a few times in a day but are briefer than her usual migraines.  She has been on Quilipta and has been having to go to ER to treat these symptoms when she does not respond to sumatriptan.        Prior notes as per 1/4/2024.  Patient last seen 4/13/2023.  She has been lost to follow up.  According to records, she has been seen multiple times in the ER for headaches as well as fall from ladder. She has been to ER multiple times and recently was in [facility] ED with migraine, with some R sided numbness 11/14/2023.  She had workup for possible stroke with CTA brain and MRI brain with no suggestion of stroke or LVO.  She has also been having medications adjusted by psychiatry and following with therapist regularly.  She currently is having migraines, which cluster for a few days when they occur.      She states in 10/2023, she was having a lot of stress and adjustment to psychiatric medications and had more frequent migraines during this time.       She is currently doing well with only 2 migraines in the past month.  In terms of her prior other type of headaches with severe pain behind left eye and tearing of left eye, she did take indomethacin and noted improvement in ~2 weeks when she was on the 50 mg tid dosing.  She has not had recurrence of these events.        She has been taking Quilipta 60 mg  daily for prevention of migraines otherwise and doing well currently. She states Imitrex does not work well for abortive therapy and takes a \"cocktail\" of tramadol, hydroxyzine and Zofran.     Prior notes as per 5/16/2024.  Patient last seen 1/4/2024.  Since last visit, she remains on Qulipta 60 mg daily for prevention of migraines.  She has been taking Ubrelvy for abortive therapy as well and denies any side effects when she takes this medication.  She notes improvement in migraines with 1 dose within ~15 minutes and states this is working better than sumatriptan.  She has migraines on average 1-2 days per month.  She has had a few brief episodes of stabbing type pain but this is not as severe as in the past and denies associate tearing of the eyes and redness in the eyes; she has not had to use indomethacin and notes these are minor overall.       Prior notes as per 11/19/2024.  Patient last seen 5/16/2024.  She has been doing well in terms of Migraines on Qulipta 60 mg daily for prevention. She only has ~ 1 migraine per month, which responds well to Ubrelvy and denies side effects on the medication.     Of note, she has pain in the back of the neck and some balance issues and some radiation to shoulders and numbness in hands and has been seen by neurosurgery with plan for cervical spinal fusion C4/5/6,. She has been dropping objects from right more than left hand as well .        Prior notes as per 2/25/2025.  Patient last seen 11/19/2024.  In terms of migraines, she has been doing well on Qulipta 60 mg daily for prevention having no migraines since last visit; previously when, migraines occurred, they would respond well to Ubrelvy and denies any side effects on the medication.   Otherwise, she had NCS/EMG since last visit and has been seen by neurosurgery with cervical spinal fusion (ACDF C4-6) done 11/27/2024 and has been doing well since this time as well, initially having some mild tension type headaches but  these have improved.        Patient last seen 2/25/2025.  She was doing well with respect to her migraines and her headaches. However ~ 2 weeks ago, she noted pain in the left side of the temple with stabbing, electric pain along with sensitivity to touching her hair, which then progressed to vision changes in left eye with \"kaleidoscope pattern\"after several hours and then progressed to nausea. She went to urgent care and was given medication but eventually went to ER and was treated with magnesium and steroid and reglan, along with zofran and toradol. She had workup which showed mild elevation in sed rate at 45 and CRP at 1.20. She did admit she was on prednisone 20 mg for \"inflamed achilles\" prior to admission and she was given 40 mg dose at urgent care prior to blood work.     When she was in the ER, MRI was done which showed some abnormalities thought to be related to migraine vs subarachnoid hemorrhage; CTA brain done was normal.     She was told she may have temporal arteritis and started on prednisone 60 mg daily, which was completed last Friday. She did not have progression to vision loss and headaches have improved. However, last night, she had some blurry vision but no \"kaleidoscope pattern\" and she took Naproxen / Tylenol with improvement.     When the symptoms started initially, she did admit to having tearing of the eye and her nose was running on the left side as well.               Past Medical History:    Allergic rhinitis    Anxiety    Arthritis    Asthma (HCC)    Back problem    Bipolar disorder (HCC)    Calculus of kidney    Cancer (HCC)    Basal cell carcinoma over the left eyebrow    Depression    Disorder of thyroid    Esophageal reflux    Hypothyroidism    Migraine    Migraines    Obesity, unspecified    PONV (postoperative nausea and vomiting)    S/P laparoscopic sleeve gastrectomy    Sleep apnea    history of, improved with weight loss surgery, no longer needs cpap    Stroke (HCC)    TIA,  possible not completely diagonosed    Visual impairment    glasses     Past Surgical History:   Procedure Laterality Date    Other      benign tumor reomoved  from left inner thigh    Other      basal carcinoma removed from upper lid of left eye    Other surgical history  2022    Gastric sleeve    Other surgical history  2024    CERVICAL 4 - CERVICAL 5, CERVICAL 5 - CERVICAL 6 ANTERIOR CERVICAL DISCECTOMY AND FUSION WITH INSTRUMENTATION, ALLOGRAFT    Skin surgery  Oct 2014    tumor removed from eyebrow ridge    Tonsillectomy       Social History     Socioeconomic History    Marital status: Single   Tobacco Use    Smoking status: Former     Current packs/day: 0.00     Average packs/day: 1 pack/day for 23.9 years (23.9 ttl pk-yrs)     Types: Cigarettes     Start date: 1985     Quit date: 12/15/2008     Years since quittin.3     Passive exposure: Never    Smokeless tobacco: Never   Vaping Use    Vaping status: Never Used   Substance and Sexual Activity    Alcohol use: Not Currently     Comment: social    Drug use: Never    Sexual activity: Not Currently     Partners: Male     Birth control/protection: Condom   Other Topics Concern    Caffeine Concern Yes     Comment: 1 cup a day    Stress Concern Yes    Weight Concern Yes    Special Diet Yes     Comment: Bariatric    Exercise Yes     Comment: 20 mins 3 x week    Seat Belt Yes     Social Drivers of Health     Food Insecurity: No Food Insecurity (2024)    Food Insecurity     Food Insecurity: Never true   Transportation Needs: No Transportation Needs (2024)    Transportation Needs     Lack of Transportation: No   Housing Stability: Low Risk  (2024)    Housing Stability     Housing Instability: No     Family History   Problem Relation Age of Onset    Depression Father     Heart Disease Father     Asthma Father     Heart Attack Father     Migraines Father     Diabetes Mother     Obesity Mother     Depression Mother     Alcohol  and Other Disorders Associated Maternal Grandmother     Cancer Maternal Grandmother         Lung CA    Cancer Maternal Grandfather         Prostate CA    Hypertension Paternal Grandmother     Cancer Paternal Grandfather         Lung CA    Personality Disorder Paternal Grandfather     Breast Cancer Maternal Aunt 65        in her 60's    Substance Abuse Maternal Uncle     Stroke Neg        Allergies:  Allergies   Allergen Reactions    Clonazepam OTHER (SEE COMMENTS)     Suicidal thoughts  Gleason out of sorts and has increased irritability as well as extremities feeling strange    Compazine [Prochlorperazine] OTHER (SEE COMMENTS)     Oral tablet-  Twitching;  Blanked out; nausea    Topiramate OTHER (SEE COMMENTS)     Suicidal,aggresive,brain fog      Current Meds:  Current Outpatient Medications   Medication Sig Dispense Refill    ubrogepant (UBRELVY) 100 MG Oral Tab Take one tablet at onset of migraine.  May take additional tablet in 2 hours if needed.  Do not exceed two tablets per 24 hour period. 10 tablet 11    indomethacin 50 MG Oral Cap Take 1 capsule (50 mg total) by mouth 3 (three) times daily with meals. During flare up of severe headaches only 21 capsule 0    levothyroxine 75 MCG Oral Tab Take 1 tablet (75 mcg total) by mouth before breakfast. 90 tablet 0    pantoprazole 20 MG Oral Tab EC Take 1 tablet (20 mg total) by mouth before breakfast. 90 tablet 0    Atogepant (QULIPTA) 30 MG Oral Tab Take 1 tablet by mouth daily. 90 tablet 1    Naloxone HCl 4 MG/0.1ML Nasal Liquid 4 mg by Nasal route as needed. If patient remains unresponsive, repeat dose in other nostril 2-5 minutes after first dose. 1 kit 0    Ketorolac Tromethamine 10 MG Oral Tab Take 2 tablets (20 mg total) by mouth As Directed. For migraines: take 20mg once at the onset of the migraine. HOLD till cleared by neurosurgery clinic      acetaminophen 500 MG Oral Tab Take 2 tablets (1,000 mg total) by mouth every 6 (six) hours as needed for Pain.       ondansetron 4 MG Oral Tablet Dispersible Take 1 tablet (4 mg total) by mouth every 8 (eight) hours as needed for Nausea. 30 tablet 0    lurasidone 80 MG Oral Tab Take 1 tablet (80 mg total) by mouth at bedtime.      hydrOXYzine 25 MG Oral Tab Take 1 tablet (25 mg total) by mouth nightly as needed for Anxiety.      LORazepam 0.5 MG Oral Tab Take 1 tablet (0.5 mg total) by mouth nightly as needed for Anxiety.      lamoTRIgine 100 MG Oral Tab Take 1 tablet (100 mg total) by mouth daily.      buPROPion  MG Oral Tablet 24 Hr Take 1 tablet (150 mg total) by mouth daily. 30 tablet 0    propranolol 20 MG Oral Tab Take 1 tablet (20 mg total) by mouth 2 (two) times daily. 60 tablet 0    naltrexone 50 MG Oral Tab Take 1 tablet (50 mg total) by mouth daily. 30 tablet 0    sertraline 100 MG Oral Tab Take 1 tablet (100 mg total) by mouth daily. 30 tablet 0    traZODone 100 MG Oral Tab Take 1 tablet (100 mg total) by mouth nightly as needed for Sleep. 30 tablet 0    meclizine 25 MG Oral Tab Take 1 tablet (25 mg total) by mouth 3 (three) times daily as needed for Dizziness. 15 tablet 0    albuterol (PROAIR HFA) 108 (90 Base) MCG/ACT Inhalation Aero Soln SHAKE WELL AND INHALE 1 TO 2 PUFFS INTO THE LUNGS EVERY 4 HOURS AS NEEDED FOR WHEEZING( COUGH) 3 each 1    Multiple Vitamins-Minerals (MULTIVITAL OR) Take 1 tablet by mouth daily. Bariatric vitamin      PREDNISONE OR Take by mouth. (Patient not taking: Reported on 4/9/2025)            ROS:   A comprehensive 10 point review of systems was completed.  Pertinent positives and negatives noted in the the HPI.      PHYSICAL EXAM:   /83 (BP Location: Left arm, Patient Position: Sitting, Cuff Size: large)   Pulse 99   Resp 16   Ht 66\"   Wt (!) 315 lb (142.9 kg)   BMI 50.84 kg/m²   Estimated body mass index is 50.84 kg/m² as calculated from the following:    Height as of this encounter: 66\".    Weight as of this encounter: 315 lb (142.9 kg).      Gen: well developed, well  nourished, no acute distress  HEENT: normocephalic  Heart; normal S1/S2, regular rate and rhythm  Lungs: clear to auscultation bilaterally  Extremities: no edema, peripheral pulses intact    Neck: supple, full range of motion; no carotid bruits    Fundoscopic Exam: optic discs sharp bilaterally    Neuro:  Mental status:  Orientation: Alert and oriented to person, place, time  Speech Fluent and conversational    CN: PERRL, EOMI with no nystagmus, VFF, smile symmetric, sensation intact, tongue and palate midline, SCM intact, otherwise, CN 2-12 intact  Motor: 5/5 strength throughout, tone normal  DTR: brisker on left UE with +benavides's; 4+; otherwise, intact throughout, toes downgoing; no clonus  Sensory: intact to light touch throughout; pin intact today  Coord: FNF intact with no tremor or dysmetria; rapid alternating movements intact bilaterally  Romberg: absent  Gait: casual gait, less stiff and able to move arm better today on R side; overall more fluid       TEST RESULTS/DATA REVIEWED:   Imaging  New    CTA BRAIN (CPT=70496)  Result Date: 3/30/2025  CONCLUSION:  Normal examination.   LOCATION:  [facility]   Dictated by (CST): Bernardo Fortune MD on 3/30/2025 at 10:08 AM     Finalized by (CST): Bernardo Fortune MD on 3/30/2025 at 10:14 AM       MRI BRAIN (W+WO) (CPT=70553)  Result Date: 3/30/2025  CONCLUSION:  There is subarachnoid FLAIR hyperintensity which is symmetric and involving the posterior temporal lobes and occipital lobes bilaterally.  This is a nonspecific finding and has been described changes related to migraine headaches, subarachnoid hemorrhage, and meningitis.  The lack of meningeal enhancement on the post contrast images would favor changes related to a migraine headache or small amount of subarachnoid hemorrhage.    LOCATION:  [facility]    Dictated by (CST): Bernardo Fortune MD on 3/30/2025 at 8:10 AM     Finalized by (CST): Bernardo Fortune MD on 3/30/2025 at 8:20 AM           Prior as noted  below    MRI cervical spine w/o contrast (10/31/2024):     FINDINGS: Vertebral body height and alignment is maintained. Multilevel disc degeneration with a small amount of discogenic reactive marrow change. Subcentimeter focus of T2/STIR signal in the spinal cord just below the C5-6 level. Spinal cord is   otherwise normal in appearance. The cerebellum, elissa, skull base are unremarkable insofar as visualized. Paraspinous soft tissues unremarkable.     Occiput-C2:  No significant central canal stenosis.     C2-3: Disc degeneration characterized by disc desiccation. No significant posterior disc bulge and no focal disc herniation. No central canal stenosis or neural foraminal narrowing.     C3-4: Disc degeneration characterized by disc desiccation and a shallow posterior disc bulge. Mild left and no right neural foraminal narrowing. No central canal stenosis.     C4-5: Disc degeneration characterized by disc desiccation and a broad posterior disc bulge. Left-sided uncovertebral joint hypertrophy. Mild to moderate central canal stenosis. Mild to moderate left and no right neural foraminal narrowing.     C5-6: Disc degeneration characterized by disc desiccation and a broad posterior disc bulge. Severe central canal stenosis. Encroachment on the spinal cord with a small focus of compression induced edema/myelomalacia just below the C5-6 level. Mild to   moderate right neural foraminal narrowing. Moderate to severe narrowing at the proximal portion of the left neural foramen. The remainder of the foramen is patent.     C6-7: Disc degeneration characterized by disc desiccation and a shallow posterior disc bulge. No focal disc herniation. No central canal stenosis or neural foraminal narrowing.     C7-T1: Disc degeneration characterized by minimal disc desiccation. No significant posterior disc bulge and no focal disc herniation. No central canal stenosis or neural foraminal narrowing.     IMPRESSION:   1.Multilevel disc  degeneration. Vertebral body height and alignment is maintained.   2.C5-6: Severe central canal stenosis. Encroachment on the spinal cord with a small focus of compression induced edema/myelomalacia just below the C5-6 disc level. Moderate to severe narrowing of the proximal portion of the left neural foramen. Mild to   moderate right neural foraminal narrowing.   3.C4-5: Mild to moderate central canal stenosis. Mild to moderate left and no right neural foraminal narrowing.         Prior as noted below    MRI brain with and without contrast (4/10/2023):     FINDINGS: Several 2-3 mm T2/FLAIR hyperintense, non-enhancing signal abnormalities are identified in the frontal and parietal subcortical white matter bilaterally. The cerebellum and brainstem are normal in morphology and signal. Normal morphology of the    corpus callosum. No areas of restricted diffusion are identified. No evidence of cortical infarct, edema, hemorrhage, mass, abnormal enhancement, or abnormal extra-axial fluid collection. Ventricular volumes are normal. No midline shift. The major   intracranial arterial flow voids and the dural venous sinuses appear patent. Normal size of the pituitary gland. No Chiari malformation.     Minimal mucosal thickening in bilateral maxillary sinuses. The mastoid air cells are normally aerated. A 4F synovial cyst along the anteromedial margin of the right temporomandibular joint is of questionable clinical significance. No bone lesions are   identified.     IMPRESSION:   1. Several 2-3 mm T2/FLAIR hyperintense, non-enhancing signal abnormalities in the frontal and parietal subcortical white matter bilaterally may relate to migraines or minimal chronic small vessel ischemic changes but are non-specific in appearance.     2. No evidence of acute infarct, cortical infarct, edema, hemorrhage, mass, or acute intracranial abnormality.     3. Minimal chronic-appearing maxillary sinus inflammatory changes.     Labs:    New  Component      Latest Ref Rn 3/30/2025   WBC      4.0 - 11.0 x10(3) uL 14.3 (H)    RBC      3.80 - 5.30 x10(6)uL 4.72    Hemoglobin      12.0 - 16.0 g/dL 13.9    Hematocrit      35.0 - 48.0 % 41.2    Platelet Count      150.0 - 450.0 10(3)uL 272.0    MCV      80.0 - 100.0 fL 87.3    MCH      26.0 - 34.0 pg 29.4    MCHC      31.0 - 37.0 g/dL 33.7    RDW      % 13.2    Prelim Neutrophil Abs      1.50 - 7.70 x10 (3) uL 10.53 (H)    Neutrophils Absolute      1.50 - 7.70 x10(3) uL 10.53 (H)    Lymphocytes Absolute      1.00 - 4.00 x10(3) uL 2.85    Monocytes Absolute      0.10 - 1.00 x10(3) uL 0.83    Eosinophils Absolute      0.00 - 0.70 x10(3) uL 0.02    Basophils Absolute      0.00 - 0.20 x10(3) uL 0.03    Immature Granulocyte Absolute      0.00 - 1.00 x10(3) uL 0.07    Neutrophils %      % 73.5    Lymphocytes %      % 19.9    Monocytes %      % 5.8    Eosinophils %      % 0.1    Basophils %      % 0.2    Immature Granulocyte %      % 0.5    Glucose      70 - 99 mg/dL 96    Sodium      136 - 145 mmol/L 140    Potassium      3.5 - 5.1 mmol/L 3.9    Chloride      98 - 112 mmol/L 105    Carbon Dioxide, Total      21.0 - 32.0 mmol/L 26.0    ANION GAP      0 - 18 mmol/L 9    BUN      9 - 23 mg/dL 7 (L)    CREATININE      0.55 - 1.02 mg/dL 0.87    CALCIUM      8.7 - 10.6 mg/dL 9.6    CALCULATED OSMOLALITY      275 - 295 mOsm/kg 288    EGFR      >=60 mL/min/1.73m2 83    AST (SGOT)      <34 U/L 15    ALT (SGPT)      10 - 49 U/L 12    ALKALINE PHOSPHATASE      39 - 100 U/L 90    Total Bilirubin      0.3 - 1.2 mg/dL 0.3    PROTEIN, TOTAL      5.7 - 8.2 g/dL 7.4    Albumin      3.2 - 4.8 g/dL 4.4    Globulin      2.0 - 3.5 g/dL 3.0    A/G Ratio      1.0 - 2.0  1.5    SED RATE      0 - 20 mm/Hr 45 (H)    C-REACTIVE PROTEIN      <=0.50 mg/dL 1.20 (H)         Prior as noted below  Component      Latest Ref Rng & Units 4/16/2022   Vitamin B12      193 - 986 pg/mL 1,014 (H)   FOLATE (FOLIC ACID), SERUM      >=8.7 ng/mL 18.1    VITAMIN B1 (THIAMINE), WHOLE B      70 - 180 nmol/L 73   VITAMIN D, 25-OH, TOTAL      30.0 - 100.0 ng/mL 44.5    4/16/2022    1,014 (H)     SARS-CoV-2 by PCR (9./1/2022): detected      Other procedures    None new    Prior as noted below    NCS/EMG (11/22/2024):     Sensory NCS      Nerve / Sites Rec. Site Onset Lat Peak Lat NP Amp PP Amp Segments Distance Peak Diff Velocity Comment       ms ms µV µV   cm ms m/s     R Median - Dig II (Antidromic)      Wrist Index 2.71 3.59 65.2 111.3 Wrist - Index 14   52        Ref.   <=3.30 <=4.00 >=11.0 >=13.0 Ref.           R Ulnar - Dig V (Antidromic)      Wrist Dig V 2.19 3.07 48.2 86.6 Wrist - Dig V 11   50        Ref.   <=3.10 <=4.00 >=11.0 >=8.0 Ref.           R Radial - Superficial (Antidromic)      Forearm Wrist 1.61 2.14 16.8 32.8 Forearm - Wrist 10   62        Ref.   <=2.20 <=2.80 >=7.0 >=11.0 Ref.           R Median, Ulnar - Transcarpal comparison      Median Palm Wrist 1.56 2.08 82.4 74.4 Median Palm - Wrist 8   51        Ulnar Palm Wrist 1.46 2.08 23.2 28.9 Ulnar Palm - Wrist 8   55                 Median Palm - Ulnar Palm   0.00                   Ref.   <=0.40           Motor NCS      Nerve / Sites Muscle Latency Amplitude Segments Dist. Lat Diff Velocity Comments       ms mV   cm ms m/s     R Median - APB      Wrist APB 4.00 5.6 Wrist - APB 7            Ref.   <=4.40 >=4.2 Ref.              Elbow APB 8.98 4.8 Elbow - Wrist 25 4.98 50.2        Ref.       Ref.     >=51.0     R Ulnar - ADM      Wrist ADM 2.77 10.7 Wrist - ADM 7            Ref.   <=3.70 >=7.9 Ref.              B.Elbow ADM 7.17 9.8 B.Elbow - Wrist 23.5 4.40 53.5        Ref.       Ref.     >=52.0         F  Wave      Nerve M Latency F Latency     ms ms   R Median - APB 4.6 28.2   Ref.   <=28.5   R Ulnar - ADM 2.6 27.9   Ref.   <=30.2                   EMG Summary Table       Spontaneous MUAP Recruitment   Muscle IA Fib PSW Fasc H.F. Amp Dur. PPP Pattern   R. Deltoid N None None None None N N N N   R.  Triceps brachii N None None None None 1+ 1+ N Reduced   R. Biceps brachii N None None None None N N N N   R. Extensor digitorum communis N None None None None N N N N   R. First dorsal interosseous N None None None None 1+ 1+ 1+ Reduced       Summary     Nerve conduction studies:  Right median sensory response was normal.  Right ulnar sensory response was normal.  Right radial sensory response was normal.  Right median to ulnar palmar mixed nerve comparison study was normal with no significant interlatency prolongation of median relative to ulnar nerve.  Right median motor response was normal; F-wave response was normal.  Right ulnar motor response was normal; F-wave response was normal.     EMG (needle exam):  Concentric needle EMG examination was performed in 5 muscles. It was normal in 3 muscle(s): R. Deltoid, R. Biceps brachii, R. Extensor digitorum communis. The study was abnormal in 2 muscle(s), with the following distribution:  No increased insertional or spontaneous activity was noted in any of the muscles tested  Motor unit potentials demonstrated increased amplitude and duration, with reduced recruitment in the right triceps brachii muscle; in addition, motor unit potentials demonstrated increased amplitude and duration with a mild degree of polyphasia in the right FDI muscle.          Conclusion:   This is a mildly abnormal study.  There is evidence for subacute to chronic denervation changes in the right triceps brachii and right FDI muscle, which could suggest a subacute to chronic lower cervical radiculopathy.  There is, however, no evidence for a large fiber polyneuropathy, primary demyelinating neuropathy, myopathy, or median entrapment neuropathy across the right wrist as clinically questioned.    IMPRESSION AND PLAN:   Crys Carter is a 48 year old female with PMHx significant for bipolar disorder and chronic episodic migraine, who originally presented 11/3/2022, for evaluation and management  of recent worsening migraines.    Neurologic exam is normal and nonfocal and patient was previously well controlled in terms of migraine, which occurred with and without aura, on preventive medication with Topamax as well as abortive therapy with sumatriptan 100 mg dose PRN.  However, she had weight loss surgery 3/2022 and had worsening migraines which did not respond to higher dose of Topamax and she developed significant side effects with cognitive changes and suicidal thoughts.  She was weaned off Topamax and had been doing better on Qulipta daily for prevention.     However, she subsequently developed a different type of headache with mainly unilateral left side headache with some associated tearing of eye - given unilateral presentation, thought this could be variant of trigeminal autonomic cephalgia (ie paroxysmal hemicrania).While most of her symptoms and exam findings noted previously could be attributed to cervical myelopathy, her right hand weakness, with left foraminal encroachment noted on MRI of the cervical spine was unexpected and could suggest a superimposed peripheral entrapment neuropathy such as carpal tunnel syndrome for which NCS/EMG R UE was completed but did not show any R carpal tunnel syndrome only showing some chronic denervation changes likely related to cervical myelopathy; she is now improved s/p ACDF C4-6 11/27/2024.        Previously, she was doing well with respect to her migraines and her headaches. However ~ 2 weeks ago, she noted pain in the left side of the temple with stabbing, electric pain along with sensitivity to touching her hair, which then progressed to vision changes in left eye with \"kaleidoscope pattern\"after several hours and then progressed to nausea. She went to urgent care and was given medication but eventually went to ER and was treated with magnesium and steroid and reglan, along with zofran and toradol. She had workup which showed mild elevation in sed rate at  45 and CRP at 1.20. She did admit she was on prednisone 20 mg for \"inflamed achilles\" prior to admission and she was given 40 mg dose at urgent care prior to blood work.     When she was in the ER, MRI was done which showed some abnormalities thought to be related to migraine vs subarachnoid hemorrhage; CTA brain done was normal.     She was told she may have temporal arteritis and started on prednisone 60 mg daily, which was completed last Friday. She did not have progression to vision loss and headaches have improved. However, last night, she had some blurry vision but no \"kaleidoscope pattern\" and she took Naproxen / Tylenol with improvement.     When the symptoms started initially, she did admit to having tearing of the eye and her nose was running on the left side as well.      Her symptoms of headache with left temporal pain could be temporal arteritis but visual disturbance and associated nasal congestion with symptoms of trigeminal neuropathy could suggest recurrence of paroxysmal hemicrania - she has not had any signs of vision loss to suggest progression of temporal arteritis and she has already been treated with steroids - recommend repeat ESR, CRP and CBC and monitor for changes; in addition, should headaches recur, recommend trial indomethacin 50 mg tid to start.       Otherwise, continue Qulipta at current dose; for abortive therapy for migraines, she notes sumatriptan was not working and is not ideal due to risk of serotonin syndrome with concurrent psychiatric medications.     She is now on ubrogepantt (Ubrelvy) and doing well with this for abortive therapy:  continue to take 100 mg  within first 30 minutes of symptoms starting; if not improved after 2 hours, take additional dose, and then stop taking; discussed potential side effects, including but not limited to nausea, drowsiness and dry mouth    1. Migraine with aura and without status migrainosus, not intractable  As noted above     2. Episodic  migraine  As noted above   - ubrogepant (UBRELVY) 100 MG Oral Tab; Take one tablet at onset of migraine.  May take additional tablet in 2 hours if needed.  Do not exceed two tablets per 24 hour period.  Dispense: 10 tablet; Refill: 11    3. Elevated sedimentation rate  As noted above   - ubrogepant (UBRELVY) 100 MG Oral Tab; Take one tablet at onset of migraine.  May take additional tablet in 2 hours if needed.  Do not exceed two tablets per 24 hour period.  Dispense: 10 tablet; Refill: 11  - Sed Rate, Westergren (Automated); Future  - C-Reactive Protein; Future  - CBC W Differential W Platelet; Future    4. Temporal pain  As noted above   - ubrogepant (UBRELVY) 100 MG Oral Tab; Take one tablet at onset of migraine.  May take additional tablet in 2 hours if needed.  Do not exceed two tablets per 24 hour period.  Dispense: 10 tablet; Refill: 11  - Sed Rate, Westergren (Automated); Future  - C-Reactive Protein; Future  - CBC W Differential W Platelet; Future    5. Episodic paroxysmal hemicrania, not intractable  As noted above   - indomethacin 50 MG Oral Cap; Take 1 capsule (50 mg total) by mouth 3 (three) times daily with meals. During flare up of severe headaches only  Dispense: 21 capsule; Refill: 0    Return in about 1 month (around 5/9/2025).    Albert Galicia MD, Neurology  [facility]  Pager [phone]  4/9/2025               Admission Reconciliation is Completed  Discharge Reconciliation is Completed

## 2025-05-04 NOTE — H&P PST ADULT - HAVE YOU HAD A FIRST COVID-19 BOOSTER?
Lab Results   Component Value Date    EGFR 7 05/03/2025    EGFR 6 05/02/2025    EGFR 4 05/01/2025    CREATININE 8.05 (H) 05/03/2025    CREATININE 9.18 (H) 05/02/2025    CREATININE 11.35 (H) 05/01/2025   Receiving supplemental dialysis 3-4 times weekly  Nephrology following   Dialysis yesterday removed 4.5L fluid   Fluid restriction    Yes

## (undated) DEVICE — PREP BETADINE KIT

## (undated) DEVICE — SOL IRR POUR NS 0.9% 500ML

## (undated) DEVICE — SOCK SPECIMEN 3/8"-1/2" MALE PORT

## (undated) DEVICE — DRAPE LIGHT HANDLE COVER (GREEN)

## (undated) DEVICE — POSITIONER FOAM EGG CRATE ULNAR 2PCS (PINK)

## (undated) DEVICE — VACUUM CURETTE MEDGYN CURVED 10MM

## (undated) DEVICE — TUBING UTERINE ASPIRATION 3/8" X 6FT W/O ADAPTER

## (undated) DEVICE — PACK LITHOTOMY

## (undated) DEVICE — GLV 6.5 PROTEXIS (WHITE)